# Patient Record
Sex: FEMALE | Race: BLACK OR AFRICAN AMERICAN | Employment: FULL TIME | URBAN - METROPOLITAN AREA
[De-identification: names, ages, dates, MRNs, and addresses within clinical notes are randomized per-mention and may not be internally consistent; named-entity substitution may affect disease eponyms.]

---

## 2017-09-08 ENCOUNTER — OFFICE VISIT (OUTPATIENT)
Dept: FAMILY MEDICINE CLINIC | Age: 27
End: 2017-09-08

## 2017-09-08 VITALS
BODY MASS INDEX: 29.49 KG/M2 | HEART RATE: 70 BPM | DIASTOLIC BLOOD PRESSURE: 104 MMHG | TEMPERATURE: 97.9 F | SYSTOLIC BLOOD PRESSURE: 142 MMHG | RESPIRATION RATE: 16 BRPM | OXYGEN SATURATION: 99 % | WEIGHT: 183.5 LBS | HEIGHT: 66 IN

## 2017-09-08 DIAGNOSIS — F31.9 BIPOLAR AFFECTIVE DISORDER, REMISSION STATUS UNSPECIFIED (HCC): Primary | ICD-10-CM

## 2017-09-08 RX ORDER — ACETAMINOPHEN AND CODEINE PHOSPHATE 120; 12 MG/5ML; MG/5ML
SOLUTION ORAL
Refills: 4 | COMMUNITY
Start: 2017-06-19 | End: 2017-10-03 | Stop reason: SDUPTHER

## 2017-09-08 RX ORDER — SERTRALINE HYDROCHLORIDE 50 MG/1
50 TABLET, FILM COATED ORAL DAILY
Qty: 30 TAB | Refills: 5 | Status: SHIPPED | OUTPATIENT
Start: 2017-09-08 | End: 2018-06-18 | Stop reason: SDUPTHER

## 2017-09-08 NOTE — MR AVS SNAPSHOT
Visit Information Date & Time Provider Department Dept. Phone Encounter #  
 9/8/2017  2:00 PM Meridith Soulier, NP [address] [facility] [phone] [id number] Follow-up Instructions Return in about 3 weeks (around 9/29/2017) for med check. Upcoming Health Maintenance Date Due DTaP/Tdap/Td series (1 - Tdap) 6/13/2011 PAP AKA CERVICAL CYTOLOGY 6/13/2011 INFLUENZA AGE 9 TO ADULT 8/1/2017 Allergies as of 9/8/2017  Review Complete On: 9/8/2017 By: Meridith Soulier, NP Severity Noted Reaction Type Reactions Imitrex [Sumatriptan] High 09/08/2017    Nausea Only, Other (comments) Blurred vision & hallucinations Current Immunizations  Never Reviewed No immunizations on file. Not reviewed this visit You Were Diagnosed With   
  
 Codes Comments Bipolar affective disorder, remission status unspecified ([facility] 75.)    -  Primary ICD-10-CM: F31.9 ICD-9-CM: 296.80 Vitals BP Pulse Temp Resp Height(growth percentile) Weight(growth percentile) (!) 142/104 70 97.9 °F (36.6 °C) (Oral) 16 5' 6\" (1.676 m) 183 lb 8 oz (83.2 kg) LMP SpO2 BMI OB Status Smoking Status 08/09/2017 (Approximate) 99% 29.62 kg/m2 Having regular periods Never Smoker Vitals History BMI and BSA Data Body Mass Index Body Surface Area  
 29.62 kg/m 2 1.97 m 2 Preferred Pharmacy Pharmacy Name Phone [facility]PHARMACY #[id number]- [address] AT [place] [address] [phone] Your Updated Medication List  
  
   
This list is accurate as of: 9/8/17  2:51 PM.  Always use your most recent med list.  
  
  
  
  
 norethindrone 0.35 mg Tab Commonly known as:  MICRONOR  
TAKE 1 TABLET BY MOUTH DAILY  
  
 sertraline 50 mg tablet Commonly known as:  ZOLOFT Take 1 Tab by mouth daily. Prescriptions Sent to Pharmacy  Refills  
 sertraline (ZOLOFT) 50 mg tablet 5  
 Sig: Take 1 Tab by mouth daily. Class: Normal  
 [id number] [place], [address] [facility] #: [phone] Route: Oral  
  
Follow-up Instructions Return in about 3 weeks (around 9/29/2017) for med check. Introducing [facility] & [facility] SERVICES! Kisha Johnson introduces [employer] patient portal. Now you can access parts of your medical record, email your doctor's office, and request medication refills online. 1. In your internet browser, go to [email or web address] 2. Click on the First Time User? Click Here link in the Sign In box. You will see the New Member Sign Up page. 3. Enter your [employer] Access Code exactly as it appears below. You will not need to use this code after youve completed the sign-up process. If you do not sign up before the expiration date, you must request a new code. · [employer] Access Code: [email or web address] Expires: 12/7/2017  2:11 PM 
 
4. Enter the last four digits of your Social Security Number (xxxx) and Date of Birth (mm/dd/yyyy) as indicated and click Submit. You will be taken to the next sign-up page. 5. Create a [employer]Perk ID. This will be your [employer] login ID and cannot be changed, so think of one that is secure and easy to remember. 6. Create a [employer] password. You can change your password at any time. 7. Enter your Password Reset Question and Answer. This can be used at a later time if you forget your password. 8. Enter your e-mail address. You will receive e-mail notification when new information is available in [address] 9. Click Sign Up. You can now view and download portions of your medical record. 10. Click the Download Summary menu link to download a portable copy of your medical information. If you have questions, please visit the Frequently Asked Questions section of the [employer]Perk website.  Remember, [employer]k is NOT to be used for urgent needs. For medical emergencies, dial 911. Now available from your iPhone and Android! Please provide this summary of care documentation to your next provider. If you have any questions after today's visit, please call [phone].

## 2017-09-08 NOTE — PROGRESS NOTES
1. Have you been to the ER, urgent care clinic since your last visit? Hospitalized since your last visit? No    2. Have you seen or consulted any other health care providers outside of the [address] [place] [address] since your last visit? Include any pap smears or colon screening. No    Chief Complaint   Patient presents with    [facility]    Bipolar     off of meds x 3 1/2 yrs, would like to discuss meds    Hypertension     postpartem BP issues x 9 mo    Migraine     x 3 yrs     Pt states she is currently nursing a 10 month old baby. She states she was referred to [facility] by Crow Nogueira. She was diagnosed as bipolar x 6 yrs but has not been on meds for 3 1/2 yrs. She would like to discuss meds. She also has had problems his hypertension for the last 9 months since childbirth. She also has an on going condition with migraines x 3 yrs.

## 2017-09-08 NOTE — PROGRESS NOTES
HISTORY OF PRESENT ILLNESS  Ankit Oswald is a 32 y.o. female. HPI  New patient to the practice. Pt states she is currently nursing a 10 month old baby. She states she was referred to [facility] by Miguel Sanchez. She was diagnosed as bipolar x 6 yrs but has not been on meds for 3 1/2 yrs. Prior to the birth of her 1year old. She would like to discuss meds. She also has had problems his hypertension for the last 9 months since childbirth. BP issues started only since child birth. She also has an on going condition with migraines x 3 yrs, these are also stress related. The FamHx, SocHx, MedHx, Medication, and Allergy lists have been reviewed and updated in the chart. ROS  A comprehensive review of system was obtained and negative except findings in the HPI    Visit Vitals    BP (!) 142/104    Pulse 70    Temp 97.9 °F (36.6 °C) (Oral)    Resp 16    Ht 5' 6\" (1.676 m)    Wt 183 lb 8 oz (83.2 kg)    LMP 08/09/2017 (Approximate)    SpO2 99%    BMI 29.62 kg/m2     Physical Exam   Constitutional: She is oriented to person, place, and time. She appears well-developed and well-nourished. Neck: No JVD present. Cardiovascular: Normal rate, regular rhythm and intact distal pulses. Exam reveals no gallop and no friction rub. No murmur heard. Pulmonary/Chest: Effort normal and breath sounds normal. No respiratory distress. She has no wheezes. Musculoskeletal: She exhibits no edema. Neurological: She is alert and oriented to person, place, and time. Skin: Skin is warm. Nursing note and vitals reviewed. ASSESSMENT and PLAN  Encounter Diagnoses   Name Primary?     Bipolar affective disorder, remission status unspecified ([facility] Yes     Orders Placed This Encounter    norethindrone (MICRONOR) 0.35 mg tab    sertraline (ZOLOFT) 50 mg tablet     Reviewed meds from before however since she is breast feeding  Sent in zoloft to help with mood for now but once done breast feeding we can add a mood stabilizer  Will recheck bp in 3 weeks, hopefully this will settle down with improved mood    I have discussed the diagnosis with the patient and the intended plan as seen in the above orders. The patient has received an after-visit summary and questions were answered concerning future plans. Patient conveyed understanding of the plan at the time of the visit.     Meridith Soulier, MSN, ANP  9/8/2017

## 2017-10-03 ENCOUNTER — OFFICE VISIT (OUTPATIENT)
Dept: FAMILY MEDICINE CLINIC | Age: 27
End: 2017-10-03

## 2017-10-03 VITALS
HEIGHT: 66 IN | TEMPERATURE: 98 F | HEART RATE: 71 BPM | BODY MASS INDEX: 28.69 KG/M2 | RESPIRATION RATE: 16 BRPM | WEIGHT: 178.5 LBS | OXYGEN SATURATION: 97 % | DIASTOLIC BLOOD PRESSURE: 94 MMHG | SYSTOLIC BLOOD PRESSURE: 132 MMHG

## 2017-10-03 DIAGNOSIS — I10 ESSENTIAL HYPERTENSION: Primary | ICD-10-CM

## 2017-10-03 DIAGNOSIS — F31.9 BIPOLAR AFFECTIVE DISORDER, REMISSION STATUS UNSPECIFIED (HCC): ICD-10-CM

## 2017-10-03 RX ORDER — METOPROLOL SUCCINATE 25 MG/1
12.5 TABLET, EXTENDED RELEASE ORAL DAILY
Qty: 30 TAB | Refills: 2 | Status: SHIPPED | OUTPATIENT
Start: 2017-10-03 | End: 2017-11-16 | Stop reason: SDUPTHER

## 2017-10-03 RX ORDER — ACETAMINOPHEN AND CODEINE PHOSPHATE 120; 12 MG/5ML; MG/5ML
1 SOLUTION ORAL DAILY
Qty: 1 PACKAGE | Refills: 5 | Status: SHIPPED | OUTPATIENT
Start: 2017-10-03 | End: 2018-09-04 | Stop reason: ALTCHOICE

## 2017-10-03 NOTE — PROGRESS NOTES
1. Have you been to the ER, urgent care clinic since your last visit? Hospitalized since your last visit? No    2. Have you seen or consulted any other health care providers outside of the [address] [place] [address] since your last visit? Include any pap smears or colon screening.  No    Chief Complaint   Patient presents with    Follow-up     3 wk f/u, med ck

## 2017-10-03 NOTE — MR AVS SNAPSHOT
Visit Information Date & Time Provider Department Dept. Phone Encounter #  
 10/3/2017  7:45 AM Avril Montoya NP [address] [facility] [phone] [id number] Follow-up Instructions Return in about 3 weeks (around 10/24/2017) for bp check. Upcoming Health Maintenance Date Due DTaP/Tdap/Td series (1 - Tdap) 6/13/2011 PAP AKA CERVICAL CYTOLOGY 6/13/2011 INFLUENZA AGE 9 TO ADULT 8/1/2017 Allergies as of 10/3/2017  Review Complete On: 10/3/2017 By: Avril Montoya NP Severity Noted Reaction Type Reactions Imitrex [Sumatriptan] High 09/08/2017    Nausea Only, Other (comments) Blurred vision & hallucinations Current Immunizations  Never Reviewed No immunizations on file. Not reviewed this visit You Were Diagnosed With   
  
 Codes Comments Essential hypertension    -  Primary ICD-10-CM: I10 
ICD-9-CM: 401.9 Bipolar affective disorder, remission status unspecified ([facility] 75.)     ICD-10-CM: F31.9 ICD-9-CM: 296.80 Vitals BP Pulse Temp Resp Height(growth percentile) Weight(growth percentile) (!) 132/94 71 98 °F (36.7 °C) (Oral) 16 5' 6\" (1.676 m) 178 lb 8 oz (81 kg) LMP SpO2 BMI OB Status Smoking Status 09/22/2017 (Approximate) 97% 28.81 kg/m2 Having regular periods Never Smoker Vitals History BMI and BSA Data Body Mass Index Body Surface Area  
 28.81 kg/m 2 1.94 m 2 [facility]erred [facility]acy [id number] [address] AT [place] [address] [phone] Your Updated Medication List  
  
   
This list is accurate as of: 10/3/17  8:09 AM.  Always use your most recent med list.  
  
  
  
  
 metoprolol succinate 25 mg XL tablet Commonly known as:  TOPROL-XL Take 0.5 Tabs by mouth daily. norethindrone 0.35 mg Tab Commonly known as:  Lion & Lion Take 1 Tab by mouth daily. sertraline 50 mg tablet Commonly known as:  ZOLOFT Take 1 Tab by mouth daily. Prescriptions Sent to Pharmacy Refills  
 metoprolol succinate (TOPROL-XL) 25 mg XL tablet 2 Sig: Take 0.5 Tabs by mouth daily. Class: Normal  
 Pharmacy: [facility]/pharmacy #[id number], [address] [facility] Ph #: [phone] Route: Oral  
 norethindrone (MICRONOR) 0.35 mg tab 5 Sig: Take 1 Tab by mouth daily. Class: Normal  
 Pharmacy: [facility]/pharmacy #[id number], [address] [facility] Ph #: [phone] Route: Oral  
  
Follow-up Instructions Return in about 3 weeks (around 10/24/2017) for bp check. Introducing [facility] & [facility]! Tye Rosa introduces [employer] patient portal. Now you can access parts of your medical record, email your doctor's office, and request medication refills online. 1. In your internet browser, go to [email or web address] 2. Click on the First Time User? Click Here link in the Sign In box. You will see the New Member Sign Up page. 3. Enter your [employer] Access Code exactly as it appears below. You will not need to use this code after youve completed the sign-up process. If you do not sign up before the expiration date, you must request a new code. · [employer] Access Code: [email or web address] Expires: 12/7/2017  2:11 PM 
 
4. Enter the last four digits of your Social Security Number (xxxx) and Date of Birth (mm/dd/yyyy) as indicated and click Submit. You will be taken to the next sign-up page. 5. Create a [employer] ID. This will be your [employer] login ID and cannot be changed, so think of one that is secure and easy to remember. 6. Create a [employer] password. You can change your password at any time. 7. Enter your Password Reset Question and Answer. This can be used at a later time if you forget your password. 8. Enter your e-mail address. You will receive e-mail notification when new information is available in [address] 9. Click Sign Up. You can now view and download portions of your medical record. 10. Click the Download Summary menu link to download a portable copy of your medical information. If you have questions, please visit the Frequently Asked Questions section of the [employer] website. Remember, [employer] is NOT to be used for urgent needs. For medical emergencies, dial 911. Now available from your iPhone and Android! Please provide this summary of care documentation to your next provider. If you have any questions after today's visit, please call [phone].

## 2017-10-03 NOTE — PROGRESS NOTES
HISTORY OF PRESENT ILLNESS  Claudette Bergeron is a 32 y.o. female. HPI  She is here for recheck bp and start of zoloft  Mood is improved, still not a lot of motivation but some happier, no adr/se of med  bp is still high, does have strong fhx of HTN/CAD, brought most recent labs from [facility] in May 2017, all in great range except cholesterol quite high  She is still breast feeding    ROS  A comprehensive review of system was obtained and negative except findings in the HPI    Visit Vitals    BP (!) 132/94    Pulse 71    Temp 98 °F (36.7 °C) (Oral)    Resp 16    Ht 5' 6\" (1.676 m)    Wt 178 lb 8 oz (81 kg)    LMP 09/22/2017 (Approximate)    SpO2 97%    BMI 28.81 kg/m2     Physical Exam   Constitutional: She is oriented to person, place, and time. She appears well-developed and well-nourished. Neck: No JVD present. Cardiovascular: Normal rate, regular rhythm and intact distal pulses. Exam reveals no gallop and no friction rub. No murmur heard. Pulmonary/Chest: Effort normal and breath sounds normal. No respiratory distress. She has no wheezes. Musculoskeletal: She exhibits no edema. Neurological: She is alert and oriented to person, place, and time. Skin: Skin is warm. Nursing note and vitals reviewed. ASSESSMENT and PLAN  Encounter Diagnoses   Name Primary?  Essential hypertension Yes    Bipolar affective disorder, remission status unspecified ([facility]      Orders Placed This Encounter    metoprolol succinate (TOPROL-XL) 25 mg XL tablet    norethindrone (MICRONOR) 0.35 mg tab     Start toprol xl 25mg 1/2 pill a day  Recheck bp with fasting chol in 3 weeks  No change in zoloft    I have discussed the diagnosis with the patient and the intended plan as seen in the above orders. The patient has received an after-visit summary and questions were answered concerning future plans. Patient conveyed understanding of the plan at the time of the visit.     Shena Foreman, MSN, ANP 10/3/2017

## 2017-11-16 RX ORDER — METOPROLOL SUCCINATE 25 MG/1
25 TABLET, EXTENDED RELEASE ORAL DAILY
Qty: 30 TAB | Refills: 0 | Status: SHIPPED | OUTPATIENT
Start: 2017-11-16 | End: 2017-12-16 | Stop reason: SDUPTHER

## 2017-12-18 RX ORDER — METOPROLOL SUCCINATE 25 MG/1
TABLET, EXTENDED RELEASE ORAL
Qty: 30 TAB | Refills: 0 | Status: SHIPPED | OUTPATIENT
Start: 2017-12-18 | End: 2018-04-08 | Stop reason: SDUPTHER

## 2018-01-26 ENCOUNTER — OFFICE VISIT (OUTPATIENT)
Dept: FAMILY MEDICINE CLINIC | Age: 28
End: 2018-01-26

## 2018-01-26 VITALS
OXYGEN SATURATION: 99 % | DIASTOLIC BLOOD PRESSURE: 82 MMHG | HEIGHT: 66 IN | BODY MASS INDEX: 29.93 KG/M2 | WEIGHT: 186.25 LBS | RESPIRATION RATE: 16 BRPM | TEMPERATURE: 98.4 F | HEART RATE: 66 BPM | SYSTOLIC BLOOD PRESSURE: 138 MMHG

## 2018-01-26 DIAGNOSIS — F31.9 BIPOLAR AFFECTIVE DISORDER, REMISSION STATUS UNSPECIFIED (HCC): Primary | ICD-10-CM

## 2018-01-26 RX ORDER — ARIPIPRAZOLE 10 MG/1
10 TABLET ORAL DAILY
Qty: 30 TAB | Refills: 5 | Status: SHIPPED | OUTPATIENT
Start: 2018-01-26 | End: 2018-09-04 | Stop reason: ALTCHOICE

## 2018-01-26 NOTE — MR AVS SNAPSHOT
[address] [place] [address] 
[phone] Patient: Melany Read MRN: [id number] MLI:4/20/1802 Visit Information Date & Time Provider Department Dept. Phone Encounter #  
 1/26/2018  1:15 PM Jus Kunz, [address] [phone] [id number] Follow-up Instructions Return in about 3 weeks (around 2/16/2018) for med check. Upcoming Health Maintenance Date Due DTaP/Tdap/Td series (1 - Tdap) 6/13/2011 PAP AKA CERVICAL CYTOLOGY 6/13/2011 Influenza Age 5 to Adult 8/1/2017 Allergies as of 1/26/2018  Review Complete On: 1/26/2018 By: Jus Kunz NP Severity Noted Reaction Type Reactions Imitrex [Sumatriptan] High 09/08/2017    Nausea Only, Other (comments) Blurred vision & hallucinations Current Immunizations  Never Reviewed No immunizations on file. Not reviewed this visit You Were Diagnosed With   
  
 Codes Comments Bipolar affective disorder, remission status unspecified ([facility] 75.)    -  Primary ICD-10-CM: F31.9 ICD-9-CM: 296.80 Vitals BP Pulse Temp Resp Height(growth percentile) Weight(growth percentile) 138/82 66 98.4 °F (36.9 °C) (Oral) 16 5' 6\" (1.676 m) 186 lb 4 oz (84.5 kg) LMP SpO2 BMI OB Status Smoking Status 01/24/2018 (Exact Date) 99% 30.06 kg/m2 Having regular periods Never Smoker Vitals History BMI and BSA Data Body Mass Index Body Surface Area 30.06 kg/m 2 1.98 m 2 [facility]erred [facility]acy [id number] [address] [facility] AT [place] [address] [phone] Your Updated Medication List  
  
   
This list is accurate as of: 1/26/18  1:43 PM.  Always use your most recent med list.  
  
  
  
  
 ARIPiprazole 10 mg tablet Commonly known as:  ABILIFY Take 1 Tab by mouth daily. metoprolol succinate 25 mg XL tablet Commonly known as:  TOPROL-XL  
TAKE 1 TABLET BY MOUTH EVERY DAY  
  
 norethindrone 0.35 mg Tab Commonly known as:  Lion & Lion Take 1 Tab by mouth daily. sertraline 50 mg tablet Commonly known as:  ZOLOFT Take 1 Tab by mouth daily. Prescriptions Sent to Pharmacy Refills ARIPiprazole (ABILIFY) 10 mg tablet 5 Sig: Take 1 Tab by mouth daily. Class: Normal  
 Pharmacy: [facility]/pharmacy #[id number], [address] [facility] #: [phone] Route: Oral  
  
Follow-up Instructions Return in about 3 weeks (around 2/16/2018) for med check. Introducing [facility] & [facility] SERVICES! Dear Ander Hatchet: Thank you for requesting a [employer] account. Our records indicate that you already have an active [employer] account. You can access your account anytime at [email or web address] Ginger [email or web address] Gates Did you know that you can access your hospital and ER discharge instructions at any time in [employer]? You can also review all of your test results from your hospital stay or ER visit. Additional Information If you have questions, please visit the Frequently Asked Questions section of the [employer] website at [email or web address] Ginger [email or web address] Gates/. Remember, [employer] is NOT to be used for urgent needs. For medical emergencies, dial 911. Now available from your iPhone and Android! Please provide this summary of care documentation to your next provider. If you have any questions after today's visit, please call [phone].

## 2018-01-26 NOTE — PROGRESS NOTES
1. Have you been to the ER, urgent care clinic since your last visit? Hospitalized since your last visit? No    2. Have you seen or consulted any other health care providers outside of the [address] [place] [address] since your last visit? Include any pap smears or colon screening.  No    Chief Complaint   Patient presents with    Follow-up     3 mo f/u

## 2018-01-26 NOTE — PROGRESS NOTES
HISTORY OF PRESENT ILLNESS  Ronald Calix is a 32 y.o. female. HPI  She is here for follow up bp which is improved on the metoprolol  No adr/se of med  Weaned son down from breast feeding  Needs to restart her bipolar med  zoloft is not enough  Having manic phases with spending, does not remember the med she took several years ago    ROS  A comprehensive review of system was obtained and negative except findings in the HPI    Visit Vitals    /82    Pulse 66    Temp 98.4 °F (36.9 °C) (Oral)    Resp 16    Ht 5' 6\" (1.676 m)    Wt 186 lb 4 oz (84.5 kg)    LMP 01/24/2018 (Exact Date)    SpO2 99%    BMI 30.06 kg/m2     Physical Exam   Constitutional: She is oriented to person, place, and time. She appears well-developed and well-nourished. Neck: No JVD present. Cardiovascular: Normal rate, regular rhythm and intact distal pulses. Exam reveals no gallop and no friction rub. No murmur heard. Pulmonary/Chest: Effort normal and breath sounds normal. No respiratory distress. She has no wheezes. Musculoskeletal: She exhibits no edema. Neurological: She is alert and oriented to person, place, and time. Skin: Skin is warm. Nursing note and vitals reviewed. ASSESSMENT and PLAN  Encounter Diagnoses   Name Primary?  Bipolar affective disorder, remission status unspecified ([facility] Yes     Orders Placed This Encounter    ARIPiprazole (ABILIFY) 10 mg tablet     Start Abilify 10mg in the evening  Keep the zoloft as well  No change to bp med  Recheck 3 weeks    I have discussed the diagnosis with the patient and the intended plan as seen in the above orders. The patient has received an after-visit summary and questions were answered concerning future plans. Patient conveyed understanding of the plan at the time of the visit.     Santhosh Rich, MSN, ANP  1/26/2018

## 2018-03-14 ENCOUNTER — TELEPHONE (OUTPATIENT)
Dept: FAMILY MEDICINE CLINIC | Age: 28
End: 2018-03-14

## 2018-03-14 RX ORDER — AZITHROMYCIN 250 MG/1
TABLET, FILM COATED ORAL
Qty: 6 TAB | Refills: 0 | Status: SHIPPED | OUTPATIENT
Start: 2018-03-14 | End: 2018-05-30 | Stop reason: ALTCHOICE

## 2018-03-14 NOTE — TELEPHONE ENCOUNTER
Pt called stating that she thinks she may have a sinus infection, she has had yellowish mucus with a tinge of blood when she blows her nose, severe headache for about 4 days, she has only taken Excedrin for the headaches nothing otc for the sinus drainage. She would like to know what she can take or if something can be called into pharmacy for her, she states that she is in between insurance, therefore she declined a appt. # [phone].

## 2018-05-30 ENCOUNTER — OFFICE VISIT (OUTPATIENT)
Dept: FAMILY MEDICINE CLINIC | Age: 28
End: 2018-05-30

## 2018-05-30 VITALS
HEIGHT: 66 IN | WEIGHT: 200 LBS | OXYGEN SATURATION: 98 % | HEART RATE: 106 BPM | SYSTOLIC BLOOD PRESSURE: 153 MMHG | RESPIRATION RATE: 16 BRPM | BODY MASS INDEX: 32.14 KG/M2 | TEMPERATURE: 99.5 F | DIASTOLIC BLOOD PRESSURE: 97 MMHG

## 2018-05-30 DIAGNOSIS — I10 ESSENTIAL HYPERTENSION: Primary | ICD-10-CM

## 2018-05-30 DIAGNOSIS — J01.10 ACUTE NON-RECURRENT FRONTAL SINUSITIS: ICD-10-CM

## 2018-05-30 RX ORDER — CEFDINIR 300 MG/1
300 CAPSULE ORAL 2 TIMES DAILY
Qty: 20 CAP | Refills: 0 | Status: SHIPPED | OUTPATIENT
Start: 2018-05-30 | End: 2018-06-07 | Stop reason: ALTCHOICE

## 2018-05-30 RX ORDER — MOMETASONE FUROATE 50 UG/1
2 SPRAY, METERED NASAL DAILY
Qty: 1 CONTAINER | Refills: 2 | Status: SHIPPED | OUTPATIENT
Start: 2018-05-30 | End: 2018-09-18 | Stop reason: CLARIF

## 2018-05-30 RX ORDER — METOPROLOL SUCCINATE 50 MG/1
50 TABLET, EXTENDED RELEASE ORAL DAILY
Qty: 30 TAB | Refills: 2 | Status: SHIPPED | OUTPATIENT
Start: 2018-05-30 | End: 2018-09-04 | Stop reason: SDUPTHER

## 2018-05-30 NOTE — MR AVS SNAPSHOT
[address] [place] [address] 
[phone] Patient: Bassem Putnam MRN: [id number] BJN:0/66/0263 Visit Information Date & Time Provider Department Dept. Phone Encounter #  
 5/30/2018  9:15 AM Lissy Yu NP [address] [facility] [phone] [id number] Upcoming Health Maintenance Date Due DTaP/Tdap/Td series (1 - Tdap) 6/13/2011 PAP AKA CERVICAL CYTOLOGY 6/13/2011 Influenza Age 5 to Adult 8/1/2018 Allergies as of 5/30/2018  Review Complete On: 5/30/2018 By: Lissy Yu NP Severity Noted Reaction Type Reactions Imitrex [Sumatriptan] High 09/08/2017    Nausea Only, Other (comments) Blurred vision & hallucinations Current Immunizations  Never Reviewed No immunizations on file. Not reviewed this visit You Were Diagnosed With   
  
 Codes Comments Essential hypertension    -  Primary ICD-10-CM: I10 
ICD-9-CM: 401.9 Acute non-recurrent frontal sinusitis     ICD-10-CM: J01.10 ICD-9-CM: 703.1 Vitals BP Pulse Temp Resp Height(growth percentile) Weight(growth percentile) (!) 153/97 (!) 106 99.5 °F (37.5 °C) (Oral) 16 5' 6\" (1.676 m) 200 lb (90.7 kg) LMP SpO2 BMI OB Status Smoking Status 04/30/2018 (Exact Date) 98% 32.28 kg/m2 Having regular periods Never Smoker BMI and BSA Data Body Mass Index Body Surface Area  
 32.28 kg/m 2 2.06 m 2 Preferred Pharmacy Pharmacy Name Phone [address] [phone] Your Updated Medication List  
  
   
This list is accurate as of 5/30/18  9:50 AM.  Always use your most recent med list.  
  
  
  
  
 ARIPiprazole 10 mg tablet Commonly known as:  ABILIFY Take 1 Tab by mouth daily. cefdinir 300 mg capsule Commonly known as:  OMNICEF Take 1 Cap by mouth two (2) times a day for 10 days. metoprolol succinate 50 mg XL tablet Commonly known as:  TOPROL-XL Take 1 Tab by mouth daily. mometasone 50 mcg/actuation nasal spray Commonly known as:  NASONEX  
2 Sprays by Both Nostrils route daily. norethindrone 0.35 mg Tab Commonly known as:  Lion & Lion Take 1 Tab by mouth daily. sertraline 50 mg tablet Commonly known as:  ZOLOFT Take 1 Tab by mouth daily. Prescriptions Sent to Pharmacy Refills  
 cefdinir (OMNICEF) 300 mg capsule 0 Sig: Take 1 Cap by mouth two (2) times a day for 10 days. Class: Normal  
 Pharmacy: [address] [facility], [address] Ph #: [phone] Route: Oral  
 mometasone (NASONEX) 50 mcg/actuation nasal spray 2 Si Sprays by Both Nostrils route daily. Class: Normal  
 Pharmacy: [address] [facility] [address], [address] [place] Ph #: [phone] Route: Both Nostrils  
 metoprolol succinate (TOPROL-XL) 50 mg XL tablet 2 Sig: Take 1 Tab by mouth daily. Class: Normal  
 Pharmacy: [address] [facility], [address] Ph #: [phone] Route: Oral  
  
Introducing [facility] & [facility] SERVICES! Dear [facility]: Thank you for requesting a [employer] account. Our records indicate that you already have an active [employer] account. You can access your account anytime at [email or web address] [facility] Did you know that you can access your hospital and ER discharge instructions at any time in [employer]? You can also review all of your test results from your hospital stay or ER visit. Additional Information If you have questions, please visit the Frequently Asked Questions section of the [employer] website at [email or web address] Remember, [employer] is NOT to be used for urgent needs. For medical emergencies, dial 911. Now available from your iPhone and Android! Please provide this summary of care documentation to your next provider. If you have any questions after today's visit, please call [phone].

## 2018-05-30 NOTE — PROGRESS NOTES
1. Have you been to the ER, urgent care clinic since your last visit? Hospitalized since your last visit? Ana Silva, 5/2018  2. Have you seen or consulted any other health care providers outside of the [address] [place] [address] since your last visit? Include any pap smears or colon screening.  No     Chief Complaint   Patient presents with    Sinus Infection     Head pressure, nasal pressure, x2 weeks

## 2018-05-30 NOTE — PROGRESS NOTES
Chief Complaint   Patient presents with    Sinus Infection     Head pressure, nasal pressure, x2 weeks     she is a 32y.o. year old female who presents for evalution. Pt states has been having sinus pressure and pain for past 2 weeks. Last night started having fever and body aches as well. Has been taking Tylenol and Tylenol sinus but not really helping. Pt currently breast feeding. Pt states blood pressure too high, even while checking at home. Typically 150/90s even if taking medication -forgot to take this morning. No chest pain, SOB, vision changes. Reviewed PmHx, RxHx, FmHx, SocHx, AllgHx and updated and dated in the chart. Review of Systems - negative except as listed above in the HPI    Objective:     Vitals:    05/30/18 0934   BP: (!) 153/97   Pulse: (!) 106   Resp: 16   Temp: 99.5 °F (37.5 °C)   TempSrc: Oral   SpO2: 98%   Weight: 200 lb (90.7 kg)   Height: 5' 6\" (1.676 m)     Physical Examination: General appearance - alert, well appearing, and in no distress  Ears - bilateral TM's and external ear canals normal  Nose - mucosal congestion, mucosal erythema and sinus tenderness noted frontal  Mouth - mucous membranes moist, pharynx normal without lesions and erythematous  Neck - bilateral symmetric anterior adenopathy  Chest - clear to auscultation, no wheezes, rales or rhonchi, symmetric air entry  Heart - normal rate, regular rhythm, normal S1, S2, no murmurs, rubs, clicks or gallops  Extremities - peripheral pulses normal, no pedal edema, no clubbing or cyanosis    Assessment/ Plan:   Diagnoses and all orders for this visit:    1. Essential hypertension  -     metoprolol succinate (TOPROL-XL) 50 mg XL tablet; Take 1 Tab by mouth daily. Increase rx. Encouraged pt to monitor BP at home, preferably in AM when first wakes up. Goal BP is < 130/90 if on meds.   Discussed consequences of uncontrolled HTN and need for yearly eye exam. Recommended pt limit salt intake and engage in regular exercise. Continue current medications. F/U 1-2 mo or sooner if needed    2. Acute non-recurrent frontal sinusitis  -     cefdinir (OMNICEF) 300 mg capsule; Take 1 Cap by mouth two (2) times a day for 10 days. -     mometasone (NASONEX) 50 mcg/actuation nasal spray; 2 Sprays by Both Nostrils route daily. New rx. Discussed and encouraged rest and clear fluids, if congestion is thick should avoid dairy. Recommended hot showers with steam and elevating head of bed. May use Vitamin C or Echinacea in addition to current therapy. Pt voiced understanding regarding plan of care. Follow-up Disposition:  Return if symptoms worsen or fail to improve. I have discussed the diagnosis with the patient and the intended plan as seen in the above orders. The patient has received an after-visit summary and questions were answered concerning future plans.      Medication Side Effects and Warnings were discussed with patient    Betty Mota NP

## 2018-06-07 ENCOUNTER — OFFICE VISIT (OUTPATIENT)
Dept: FAMILY MEDICINE CLINIC | Age: 28
End: 2018-06-07

## 2018-06-07 VITALS
HEIGHT: 66 IN | WEIGHT: 203 LBS | OXYGEN SATURATION: 97 % | BODY MASS INDEX: 32.62 KG/M2 | RESPIRATION RATE: 20 BRPM | TEMPERATURE: 98.3 F

## 2018-06-07 DIAGNOSIS — I10 ESSENTIAL HYPERTENSION: ICD-10-CM

## 2018-06-07 DIAGNOSIS — J02.9 SORE THROAT: Primary | ICD-10-CM

## 2018-06-07 RX ORDER — LEVOCETIRIZINE DIHYDROCHLORIDE 5 MG/1
5 TABLET, FILM COATED ORAL DAILY
Qty: 30 TAB | Refills: 5 | Status: SHIPPED | OUTPATIENT
Start: 2018-06-07 | End: 2018-09-18 | Stop reason: CLARIF

## 2018-06-07 NOTE — MR AVS SNAPSHOT
[address] [address] [phone] Patient: Lon Porter MRN: [id number] KTU:9/93/1590 Visit Information Date & Time Provider Department Dept. Phone Encounter #  
 6/7/2018  3:45 PM Fela Barron MD [facility] [phone] [id number] Follow-up Instructions Return if symptoms worsen or fail to improve. Your Appointments 6/29/2018  8:15 AM  
ESTABLISHED PATIENT with Irina Boo NP [facility] ([facility]) Appt Note: f/u with blood pressure N [address] [place] [address] Road [address] [phone]  
  
   
 N [address] [place] [id number] Upcoming Health Maintenance Date Due DTaP/Tdap/Td series (1 - Tdap) 6/13/2011 PAP AKA CERVICAL CYTOLOGY 6/13/2011 Influenza Age 5 to Adult 8/1/2018 Allergies as of 6/7/2018  Review Complete On: 6/7/2018 By: Fela Barron MD  
  
 Severity Noted Reaction Type Reactions Imitrex [Sumatriptan] High 09/08/2017    Nausea Only, Other (comments) Blurred vision & hallucinations Current Immunizations  Never Reviewed No immunizations on file. Not reviewed this visit You Were Diagnosed With   
  
 Codes Comments Sore throat    -  Primary ICD-10-CM: J02.9 ICD-9-CM: 613 Essential hypertension     ICD-10-CM: I10 
ICD-9-CM: 401.9 Vitals BP Temp Resp Height(growth percentile) Weight(growth percentile) SpO2  
 (!) (P) 171/116 98.3 °F (36.8 °C) 20 5' 6\" (1.676 m) 203 lb (92.1 kg) 97% BMI OB Status Smoking Status 32.77 kg/m2 Having regular periods Never Smoker Vitals History BMI and BSA Data Body Mass Index Body Surface Area 32.77 kg/m 2 2.07 m 2 Preferred Pharmacy Pharmacy Name Phone [id number] [address] [facility] AT [place] [address] [phone] Your Updated Medication List  
 This list is accurate as of 6/7/18  4:21 PM.  Always use your most recent med list.  
  
  
  
  
 ARIPiprazole 10 mg tablet Commonly known as:  ABILIFY Take 1 Tab by mouth daily. levocetirizine 5 mg tablet Commonly known as:  Charol High Take 1 Tab by mouth daily. metoprolol succinate 50 mg XL tablet Commonly known as:  TOPROL-XL Take 1 Tab by mouth daily. mometasone 50 mcg/actuation nasal spray Commonly known as:  NASONEX  
2 Sprays by Both Nostrils route daily. norethindrone 0.35 mg Tab Commonly known as:  Lion & Lion Take 1 Tab by mouth daily. sertraline 50 mg tablet Commonly known as:  ZOLOFT Take 1 Tab by mouth daily. Prescriptions Sent to Pharmacy Refills  
 levocetirizine (XYZAL) 5 mg tablet 5 Sig: Take 1 Tab by mouth daily. Class: Normal  
 Pharmacy: [facility]/pharmacy #[id number] Ph #: [phone] Route: Oral  
  
We Performed the Following AMB POC RAPID STREP A [55873 CPT(R)] Follow-up Instructions Return if symptoms worsen or fail to improve. Introducing [facility]! Dear Shawn Hester: Thank you for requesting a [employer] account. Our records indicate that you already have an active [employer] account. You can access your account anytime at [email or web address] Did you know that you can access your hospital and ER discharge instructions at any time in [employer]? You can also review all of your test results from your hospital stay or ER visit. Additional Information If you have questions, please visit the Frequently Asked Questions section of the [employer] website at [email or web address] Remember, [employer] is NOT to be used for urgent needs. For medical emergencies, dial 911. Now available from your iPhone and Android! Please provide this summary of care documentation to your next provider. If you have any questions after today's visit, please call [phone].

## 2018-06-07 NOTE — PROGRESS NOTES
Patient here for sore throat and cough, mostly at night, bloody mucous since Friday. Patient states she is nursing at this time. Baby is 21 months old. 1. Have you been to the ER, urgent care clinic since your last visit? Hospitalized since your last visit? No    2. Have you seen or consulted any other health care providers outside of the [address] [place] [address] since your last visit? Include any pap smears or colon screening. No         Chief Complaint   Patient presents with    Cough     cough , sore throat since Friday     She is a 32 y.o. female who presents for evalution. Reviewed PmHx, RxHx, FmHx, SocHx, AllgHx and updated and dated in the chart. Patient Active Problem List    Diagnosis    Essential hypertension    Bipolar affective disorder ([facility] [address]       Review of Systems - negative except as listed above in the HPI    Objective:     Vitals:    06/07/18 1601   BP: (!) (P) 171/116   Resp: 20   Temp: 98.3 °F (36.8 °C)   SpO2: 97%   Weight: 203 lb (92.1 kg)   Height: 5' 6\" (1.676 m)     Physical Examination: General appearance - alert, well appearing, and in no distress  Nose - clear rhinorrhea  Neck - supple, no significant adenopathy  Chest - clear to auscultation, no wheezes, rales or rhonchi, symmetric air entry  Heart - normal rate, regular rhythm, normal S1, S2, no murmurs, rubs, clicks or gallops    Assessment/ Plan:   Diagnoses and all orders for this visit:    1. Sore throat  -     AMB POC RAPID STREP A-neg  -     levocetirizine (XYZAL) 5 mg tablet; Take 1 Tab by mouth daily. 2. Essential hypertension  -better       Follow-up Disposition:  Return if symptoms worsen or fail to improve. I have discussed the diagnosis with the patient and the intended plan as seen in the above orders. The patient understands and agrees with the plan. The patient has received an after-visit summary and questions were answered concerning future plans.      Medication Side Effects and Warnings were discussed with patient  Patient Labs were reviewed and or requested:  Patient Past Records were reviewed and or requested    Linnette Coronel M.D. There are no Patient Instructions on file for this visit.

## 2018-06-18 RX ORDER — SERTRALINE HYDROCHLORIDE 50 MG/1
50 TABLET, FILM COATED ORAL DAILY
Qty: 30 TAB | Refills: 5 | Status: SHIPPED | OUTPATIENT
Start: 2018-06-18 | End: 2018-09-11 | Stop reason: SDUPTHER

## 2018-09-04 DIAGNOSIS — I10 ESSENTIAL HYPERTENSION: ICD-10-CM

## 2018-09-04 RX ORDER — ETONOGESTREL AND ETHINYL ESTRADIOL 11.7; 2.7 MG/1; MG/1
INSERT, EXTENDED RELEASE VAGINAL
Qty: 3 DEVICE | Refills: 3 | Status: ON HOLD | OUTPATIENT
Start: 2018-09-04 | End: 2019-10-15

## 2018-09-04 RX ORDER — METOPROLOL SUCCINATE 50 MG/1
50 TABLET, EXTENDED RELEASE ORAL DAILY
Qty: 30 TAB | Refills: 2 | Status: SHIPPED | OUTPATIENT
Start: 2018-09-04 | End: 2019-01-11 | Stop reason: SDUPTHER

## 2018-09-04 RX ORDER — LAMOTRIGINE 25 MG/1
25 TABLET ORAL DAILY
Qty: 30 TAB | Refills: 5 | Status: SHIPPED | OUTPATIENT
Start: 2018-09-04 | End: 2018-11-28 | Stop reason: SDUPTHER

## 2018-09-11 RX ORDER — SERTRALINE HYDROCHLORIDE 50 MG/1
TABLET, FILM COATED ORAL
Qty: 30 TAB | Refills: 1 | Status: SHIPPED | OUTPATIENT
Start: 2018-09-11 | End: 2018-11-28 | Stop reason: SDUPTHER

## 2018-09-13 ENCOUNTER — TELEPHONE (OUTPATIENT)
Dept: FAMILY MEDICINE CLINIC | Age: 28
End: 2018-09-13

## 2018-09-13 RX ORDER — AZITHROMYCIN 250 MG/1
TABLET, FILM COATED ORAL
Qty: 6 TAB | Refills: 0 | Status: SHIPPED | OUTPATIENT
Start: 2018-09-13 | End: 2018-09-18 | Stop reason: ALTCHOICE

## 2018-09-13 NOTE — TELEPHONE ENCOUNTER
----- Message from 56Ritchie Michael Cornell sent at 9/12/2018  5:09 PM EDT -----  Regarding: GINGER Honeycutt/Skye Fuchs  is requesting a Trinh Ira' states that she has a sinus infection. [facility]. Best contact [phone].

## 2018-09-18 ENCOUNTER — OFFICE VISIT (OUTPATIENT)
Dept: FAMILY MEDICINE CLINIC | Age: 28
End: 2018-09-18

## 2018-09-18 VITALS
HEIGHT: 66 IN | WEIGHT: 203.2 LBS | BODY MASS INDEX: 32.66 KG/M2 | SYSTOLIC BLOOD PRESSURE: 132 MMHG | RESPIRATION RATE: 18 BRPM | DIASTOLIC BLOOD PRESSURE: 98 MMHG | OXYGEN SATURATION: 96 % | HEART RATE: 62 BPM | TEMPERATURE: 98.6 F

## 2018-09-18 DIAGNOSIS — I10 ESSENTIAL HYPERTENSION: ICD-10-CM

## 2018-09-18 DIAGNOSIS — J01.00 ACUTE MAXILLARY SINUSITIS, RECURRENCE NOT SPECIFIED: ICD-10-CM

## 2018-09-18 DIAGNOSIS — N91.2 AMENORRHEA: ICD-10-CM

## 2018-09-18 DIAGNOSIS — Z00.00 WELL ADULT EXAM: Primary | ICD-10-CM

## 2018-09-18 DIAGNOSIS — J30.9 ALLERGIC RHINITIS, UNSPECIFIED SEASONALITY, UNSPECIFIED TRIGGER: ICD-10-CM

## 2018-09-18 LAB
HCG URINE, QL. (POC): NEGATIVE
VALID INTERNAL CONTROL?: YES

## 2018-09-18 RX ORDER — FLUTICASONE PROPIONATE 50 MCG
2 SPRAY, SUSPENSION (ML) NASAL DAILY
Qty: 1 BOTTLE | Refills: 5 | Status: ON HOLD | OUTPATIENT
Start: 2018-09-18 | End: 2019-10-15

## 2018-09-18 RX ORDER — HYDROCHLOROTHIAZIDE 12.5 MG/1
12.5 TABLET ORAL DAILY
Qty: 30 TAB | Refills: 5 | Status: SHIPPED | OUTPATIENT
Start: 2018-09-18 | End: 2018-12-06 | Stop reason: SDUPTHER

## 2018-09-18 RX ORDER — AZITHROMYCIN 250 MG/1
TABLET, FILM COATED ORAL
Qty: 6 TAB | Refills: 0 | Status: ON HOLD | OUTPATIENT
Start: 2018-09-18 | End: 2019-10-15

## 2018-09-18 RX ORDER — CETIRIZINE HCL 10 MG
10 TABLET ORAL DAILY
Qty: 30 TAB | Refills: 5 | Status: ON HOLD | OUTPATIENT
Start: 2018-09-18 | End: 2019-10-15

## 2018-09-18 NOTE — MR AVS SNAPSHOT
[address] [place] [address] 
[phone] Patient: Tricia Ventura MRN: [id number] OPE:[birth year] Visit Information Date & Time Provider Department Dept. Phone Encounter #  
 9/18/2018  7:45 AM Hector Mackay NP [facility] [phone] [id number] Upcoming Health Maintenance Date Due DTaP/Tdap/Td series (1 - Tdap) 6/13/2011 PAP AKA CERVICAL CYTOLOGY 6/13/2011 Influenza Age 5 to Adult 8/1/2018 Allergies as of 9/18/2018  Review Complete On: 9/18/2018 By: Camila Mccoy Severity Noted Reaction Type Reactions Imitrex [Sumatriptan] High 09/08/2017    Nausea Only, Other (comments) Blurred vision & hallucinations Current Immunizations  Never Reviewed No immunizations on file. Not reviewed this visit You Were Diagnosed With   
  
 Codes Comments Well adult exam    -  Primary ICD-10-CM: Z00.00 ICD-9-CM: V70.0 Amenorrhea     ICD-10-CM: N91.2 ICD-9-CM: 626.0 Allergic rhinitis, unspecified seasonality, unspecified trigger     ICD-10-CM: J30.9 ICD-9-CM: 477.9 Essential hypertension     ICD-10-CM: I10 
ICD-9-CM: 401.9 Acute maxillary sinusitis, recurrence not specified     ICD-10-CM: J01.00 ICD-9-CM: 461.0 Vitals BP Pulse Temp Resp Height(growth percentile) Weight(growth percentile) (!) 132/98 (BP 1 Location: Left arm, BP Patient Position: Sitting) 62 98.6 °F (37 °C) (Oral) 18 5' 6\" (1.676 m) 203 lb 3.2 oz (92.2 kg) LMP SpO2 BMI OB Status Smoking Status 08/15/2018 96% 32.8 kg/m2 Having regular periods Never Smoker Vitals History BMI and BSA Data Body Mass Index Body Surface Area  
 32.8 kg/m 2 2.07 m 2 Preferred Pharmacy Pharm[facility] Name Phone [id number] [address] AT [place] [address] [phone] Your Updated Medication List  
  
   
 This list is accurate as of 18  8:49 AM.  Always use your most recent med list.  
  
  
  
  
 azithromycin 250 mg tablet Commonly known as:  Zionkaylie Angel Take two tablets today then one tablet daily  
  
 cetirizine 10 mg tablet Commonly known as:  ZYRTEC Take 1 Tab by mouth daily. ethinyl estradiol-etonogestrel 0.12-0.015 mg/24 hr vaginal ring Commonly known as:  Tennille Benes Insert vaginally for 21 days remove for 7 days  
  
 fluticasone 50 mcg/actuation nasal spray Commonly known as:  Candelario Peel 2 Sprays by Both Nostrils route daily. hydroCHLOROthiazide 12.5 mg tablet Commonly known as:  HYDRODIURIL Take 1 Tab by mouth daily. lamoTRIgine 25 mg tablet Commonly known as: LaMICtal  
Take 1 Tab by mouth daily. - 7 pm  
  
 metoprolol succinate 50 mg XL tablet Commonly known as:  TOPROL-XL Take 1 Tab by mouth daily. sertraline 50 mg tablet Commonly known as:  ZOLOFT  
TAKE 1 TABLET BY MOUTH DAILY. Prescriptions Sent to Pharmacy Refills  
 cetirizine (ZYRTEC) 10 mg tablet 5 Sig: Take 1 Tab by mouth daily. Class: Normal  
 Pharmacy: [facility]/pharmacy #[id number] Ph #: [phone] Route: Oral  
 fluticasone (FLONASE) 50 mcg/actuation nasal spray 5 Si Sprays by Both Nostrils route daily. Class: Normal  
 Pharmacy: [facility]/pharmacy #[id number] Ph #: [phone] Route: Both Nostrils  
 azithromycin (ZITHROMAX Z-BUD) 250 mg tablet 0 Sig: Take two tablets today then one tablet daily Class: Normal  
 Pharmacy: [facility]/pharmacy #[id number] [address] [facility] Ph #: [phone]  
 hydroCHLOROthiazide (HYDRODIURIL) 12.5 mg tablet 5 Sig: Take 1 Tab by mouth daily.   
 Class: Normal  
 Pharmacy: [facility]/pharmacy #[id number] - [place], [address] [facility] #: [phone] Route: Oral  
  
We Performed the Following AMB POC URINE PREGNANCY TEST, VISUAL COLOR COMPARISON [38693 CPT(R)] CBC WITH AUTOMATED DIFF [49773 CPT(R)] LIPID PANEL [36106 CPT(R)] METABOLIC PANEL, COMPREHENSIVE [53034 CPT(R)] TSH 3RD GENERATION [63770 CPT(R)] Introducing [facility]! Dear Miguelangel Ortiz: Thank you for requesting a [employer] account. Our records indicate that you already have an active [employer] account. You can access your account anytime at [email or web address]. [employer] Did you know that you can access your hospital and ER discharge instructions at any time in [employer]? You can also review all of your test results from your hospital stay or ER visit. Additional Information If you have questions, please visit the Frequently Asked Questions section of the [employer] website at [email or web address]. [email or web address]/. Remember, [employer] is NOT to be used for urgent needs. For medical emergencies, dial 911. Now available from your iPhone and Android! Please provide this summary of care documentation to your next provider. If you have any questions after today's visit, please call [phone].

## 2018-09-18 NOTE — PROGRESS NOTES
Subjective:   1 [address] y.o. female for Well Woman Check. Her gyne and breast care is done elsewhere by her Ob-Gyne physician. Patient Active Problem List    Diagnosis Date Noted    Allergic rhinitis 09/18/2018    Essential hypertension 10/03/2017    Bipolar affective disorder ([facility] [address] 09/08/2017     Current Outpatient Prescriptions   Medication Sig Dispense Refill    cetirizine (ZYRTEC) 10 mg tablet Take 1 Tab by mouth daily. 30 Tab 5    fluticasone (FLONASE) 50 mcg/actuation nasal spray 2 Sprays by Both Nostrils route daily. 1 Bottle 5    azithromycin (ZITHROMAX Z-BUD) 250 mg tablet Take two tablets today then one tablet daily 6 Tab 0    hydroCHLOROthiazide (HYDRODIURIL) 12.5 mg tablet Take 1 Tab by mouth daily. 30 Tab 5    sertraline (ZOLOFT) 50 mg tablet TAKE 1 TABLET BY MOUTH DAILY. 30 Tab 1    metoprolol succinate (TOPROL-XL) 50 mg XL tablet Take 1 Tab by mouth daily. 30 Tab 2    ethinyl estradiol-etonogestrel (NUVARING) 0.12-0.015 mg/24 hr vaginal ring Insert vaginally for 21 days remove for 7 days 3 Device 3    lamoTRIgine (LAMICTAL) 25 mg tablet Take 1 Tab by mouth daily. - 7 pm 27 Tab 5     Family History   Problem Relation Age of Onset    Hypertension Mother     Other Mother      fibromyalgia    Diabetes Mother     Sleep Apnea Mother     Hypertension Father      Social History   Substance Use Topics    Smoking status: Never Smoker    Smokeless tobacco: Never Used    Alcohol use 3.0 oz/week     1 Glasses of wine, 4 Shots of liquor per week      Comment:  wine daily, liquor on the weekends             ROS: Feeling generally well. No TIA's or unusual headaches, no dysphagia. No prolonged cough. No dyspnea or chest pain on exertion. No abdominal pain, change in bowel habits, black or bloody stools. No urinary tract symptoms. No new or unusual musculoskeletal symptoms.     Specific concerns today: wants urine pregnancy, periods have been irregular and this month late ever since stopping breast feeding, on nuvaring but has not inserted since cycle has not started. Objective: The patient appears well, alert, oriented x 3, in no distress. Visit Vitals    BP (!) 132/98 (BP 1 Location: Left arm, BP Patient Position: Sitting)    Pulse 62    Temp 98.6 °F (37 °C) (Oral)    Resp 18    Ht 5' 6\" (1.676 m)    Wt 203 lb 3.2 oz (92.2 kg)    LMP 08/15/2018    SpO2 96%    BMI 32.8 kg/m2     ENT normal.  Neck supple. No adenopathy or thyromegaly. JENNY. Lungs are clear, good air entry, no wheezes, rhonchi or rales. S1 and S2 normal, no murmurs, regular rate and rhythm. Abdomen soft without tenderness, guarding, mass or organomegaly. Extremities show no edema, normal peripheral pulses. Neurological is normal, no focal findings. Breast and Pelvic exams are deferred. Assessment/Plan:   Well Woman  lose weight, increase physical activity, follow low fat diet, follow low salt diet, routine labs ordered  Encounter Diagnoses   Name Primary?  Well adult exam Yes    Amenorrhea     Allergic rhinitis, unspecified seasonality, unspecified trigger     Essential hypertension     Acute maxillary sinusitis, recurrence not specified      Orders Placed This Encounter    CBC WITH AUTOMATED DIFF    LIPID PANEL    METABOLIC PANEL, COMPREHENSIVE    TSH 3RD GENERATION    AMB POC URINE PREGNANCY TEST, VISUAL COLOR COMPARISON    cetirizine (ZYRTEC) 10 mg tablet    fluticasone (FLONASE) 50 mcg/actuation nasal spray    azithromycin (ZITHROMAX Z-BUD) 250 mg tablet    hydroCHLOROthiazide (HYDRODIURIL) 12.5 mg tablet     Urine pregnancy negative, go ahead and put in Nuvaring to help regulate cycle  Start hctz for htn as well as extra med  Adr/se reviewed  Follow-up Disposition: 4 weeks bp check    I have discussed the diagnosis with the patient and the intended plan as seen in the above orders. The patient has received an after-visit summary and questions were answered concerning future plans.  Patient conveyed understanding of the plan at the time of the visit.     Amber Burch, MSN, ANP  9/18/2018

## 2018-09-18 NOTE — PROGRESS NOTES
Pietro Jean is a 29 y.o. female      Fasting    Chief Complaint   Patient presents with    Complete Physical    Labs       1. Have you been to the ER, urgent care clinic since your last visit? Hospitalized since your last visit? Yes When: 07/2018 [facility] Reason for visit: Muscle spasm in chest  M  2. Have you seen or consulted any other health care providers outside of the [address] [place] [address] since your last visit? Include any pap smears or colon screening.  No      Visit Vitals    BP (!) 132/98 (BP 1 Location: Left arm, BP Patient Position: Sitting)    Pulse 62    Temp 98.6 °F (37 °C) (Oral)    Resp 18    Ht 5' 6\" (1.676 m)    Wt 203 lb 3.2 oz (92.2 kg)    SpO2 96%    BMI 32.8 kg/m2           Health Maintenance Due   Topic Date Due    DTaP/Tdap/Td series (1 - Tdap) 06/13/2011    PAP AKA CERVICAL CYTOLOGY  06/13/2011    Influenza Age 9 to Adult  08/01/2018

## 2018-09-19 LAB
ALBUMIN SERPL-MCNC: 4.7 G/DL (ref 3.5–5.5)
ALBUMIN/GLOB SERPL: 1.6 {RATIO} (ref 1.2–2.2)
ALP SERPL-CCNC: 55 IU/L (ref 39–117)
ALT SERPL-CCNC: 21 IU/L (ref 0–32)
AST SERPL-CCNC: 20 IU/L (ref 0–40)
BASOPHILS # BLD AUTO: 0 X10E3/UL (ref 0–0.2)
BASOPHILS NFR BLD AUTO: 1 %
BILIRUB SERPL-MCNC: 0.7 MG/DL (ref 0–1.2)
BUN SERPL-MCNC: 12 MG/DL (ref 6–20)
BUN/CREAT SERPL: 12 (ref 9–23)
CALCIUM SERPL-MCNC: 9.1 MG/DL (ref 8.7–10.2)
CHLORIDE SERPL-SCNC: 102 MMOL/L (ref 96–106)
CHOLEST SERPL-MCNC: 230 MG/DL (ref 100–199)
CO2 SERPL-SCNC: 22 MMOL/L (ref 20–29)
CREAT SERPL-MCNC: 0.97 MG/DL (ref 0.57–1)
EOSINOPHIL # BLD AUTO: 0.2 X10E3/UL (ref 0–0.4)
EOSINOPHIL NFR BLD AUTO: 3 %
ERYTHROCYTE [DISTWIDTH] IN BLOOD BY AUTOMATED COUNT: 13.4 % (ref 12.3–15.4)
GLOBULIN SER CALC-MCNC: 2.9 G/DL (ref 1.5–4.5)
GLUCOSE SERPL-MCNC: 86 MG/DL (ref 65–99)
HCT VFR BLD AUTO: 38.2 % (ref 34–46.6)
HDLC SERPL-MCNC: 53 MG/DL
HGB BLD-MCNC: 12.5 G/DL (ref 11.1–15.9)
IMM GRANULOCYTES # BLD: 0 X10E3/UL (ref 0–0.1)
IMM GRANULOCYTES NFR BLD: 0 %
INTERPRETATION, 910389: NORMAL
LDLC SERPL CALC-MCNC: 148 MG/DL (ref 0–99)
LYMPHOCYTES # BLD AUTO: 1.5 X10E3/UL (ref 0.7–3.1)
LYMPHOCYTES NFR BLD AUTO: 27 %
MCH RBC QN AUTO: 29.2 PG (ref 26.6–33)
MCHC RBC AUTO-ENTMCNC: 32.7 G/DL (ref 31.5–35.7)
MCV RBC AUTO: 89 FL (ref 79–97)
MONOCYTES # BLD AUTO: 0.5 X10E3/UL (ref 0.1–0.9)
MONOCYTES NFR BLD AUTO: 9 %
NEUTROPHILS # BLD AUTO: 3.3 X10E3/UL (ref 1.4–7)
NEUTROPHILS NFR BLD AUTO: 60 %
PLATELET # BLD AUTO: 209 X10E3/UL (ref 150–379)
POTASSIUM SERPL-SCNC: 4.2 MMOL/L (ref 3.5–5.2)
PROT SERPL-MCNC: 7.6 G/DL (ref 6–8.5)
RBC # BLD AUTO: 4.28 X10E6/UL (ref 3.77–5.28)
SODIUM SERPL-SCNC: 137 MMOL/L (ref 134–144)
TRIGL SERPL-MCNC: 143 MG/DL (ref 0–149)
TSH SERPL DL<=0.005 MIU/L-ACNC: 0.85 UIU/ML (ref 0.45–4.5)
VLDLC SERPL CALC-MCNC: 29 MG/DL (ref 5–40)
WBC # BLD AUTO: 5.4 X10E3/UL (ref 3.4–10.8)

## 2018-09-20 NOTE — PROGRESS NOTES
Hey there, your labs overall look good for sugar, thyroid and kidney/liver functions. Your cholesterol is very high. Watch the diet for red meat/pork, dairy products, and fried/fast foods. Recheck labs in 6 months fasting.    William Woody

## 2018-11-28 RX ORDER — LAMOTRIGINE 25 MG/1
25 TABLET ORAL DAILY
Qty: 30 TAB | Refills: 5 | Status: ON HOLD | OUTPATIENT
Start: 2018-11-28 | End: 2019-10-15

## 2018-11-28 RX ORDER — SERTRALINE HYDROCHLORIDE 50 MG/1
TABLET, FILM COATED ORAL
Qty: 30 TAB | Refills: 1 | Status: SHIPPED | OUTPATIENT
Start: 2018-11-28 | End: 2018-12-06 | Stop reason: SDUPTHER

## 2019-01-11 DIAGNOSIS — I10 ESSENTIAL HYPERTENSION: ICD-10-CM

## 2019-01-11 RX ORDER — METOPROLOL SUCCINATE 50 MG/1
TABLET, EXTENDED RELEASE ORAL
Qty: 30 TAB | Refills: 2 | Status: ON HOLD | OUTPATIENT
Start: 2019-01-11 | End: 2019-10-15

## 2019-03-26 LAB
ANTIBODY SCREEN, EXTERNAL: NEGATIVE
CHLAMYDIA, EXTERNAL: NEGATIVE
HBSAG, EXTERNAL: NEGATIVE
HIV, EXTERNAL: NON REACTIVE
N. GONORRHEA, EXTERNAL: NEGATIVE
RUBELLA, EXTERNAL: NORMAL
T. PALLIDUM, EXTERNAL: NON REACTIVE
TYPE, ABO & RH, EXTERNAL: NORMAL

## 2019-04-19 ENCOUNTER — HOSPITAL ENCOUNTER (EMERGENCY)
Age: 29
Discharge: HOME | End: 2019-04-19
Attending: EMERGENCY MEDICINE | Admitting: EMERGENCY MEDICINE
Payer: COMMERCIAL

## 2019-04-19 VITALS
BODY MASS INDEX: 33.91 KG/M2 | HEIGHT: 66 IN | TEMPERATURE: 98.3 F | RESPIRATION RATE: 14 BRPM | SYSTOLIC BLOOD PRESSURE: 158 MMHG | OXYGEN SATURATION: 96 % | WEIGHT: 211 LBS | HEART RATE: 94 BPM | DIASTOLIC BLOOD PRESSURE: 99 MMHG

## 2019-04-19 DIAGNOSIS — O10.011 PRE-EXISTING ESSENTIAL HYPERTENSION DURING PREGNANCY IN FIRST TRIMESTER: Primary | ICD-10-CM

## 2019-04-19 LAB
ALBUMIN SERPL-MCNC: 3.6 G/DL (ref 3.5–5)
ALBUMIN/GLOB SERPL: 0.8 {RATIO} (ref 1.1–2.2)
ALP SERPL-CCNC: 48 U/L (ref 45–117)
ALT SERPL-CCNC: 28 U/L (ref 12–78)
ANION GAP SERPL CALC-SCNC: 8 MMOL/L (ref 5–15)
APPEARANCE UR: CLEAR
AST SERPL-CCNC: 16 U/L (ref 15–37)
BASOPHILS # BLD: 0 K/UL (ref 0–0.1)
BASOPHILS NFR BLD: 0 % (ref 0–1)
BILIRUB SERPL-MCNC: 0.4 MG/DL (ref 0.2–1)
BILIRUB UR QL: NEGATIVE
BUN SERPL-MCNC: 14 MG/DL (ref 6–20)
BUN/CREAT SERPL: 12 (ref 12–20)
CALCIUM SERPL-MCNC: 9.6 MG/DL (ref 8.5–10.1)
CHLORIDE SERPL-SCNC: 103 MMOL/L (ref 97–108)
CO2 SERPL-SCNC: 24 MMOL/L (ref 21–32)
COLOR UR: NORMAL
CREAT SERPL-MCNC: 1.14 MG/DL (ref 0.55–1.02)
DIFFERENTIAL METHOD BLD: NORMAL
EOSINOPHIL # BLD: 0.2 K/UL (ref 0–0.4)
EOSINOPHIL NFR BLD: 2 % (ref 0–7)
ERYTHROCYTE [DISTWIDTH] IN BLOOD BY AUTOMATED COUNT: 12.7 % (ref 11.5–14.5)
GLOBULIN SER CALC-MCNC: 4.3 G/DL (ref 2–4)
GLUCOSE SERPL-MCNC: 126 MG/DL (ref 65–100)
GLUCOSE UR STRIP.AUTO-MCNC: NEGATIVE MG/DL
HCG SERPL-ACNC: ABNORMAL MIU/ML (ref 0–6)
HCT VFR BLD AUTO: 36.1 % (ref 35–47)
HGB BLD-MCNC: 12.1 G/DL (ref 11.5–16)
HGB UR QL STRIP: NEGATIVE
IMM GRANULOCYTES # BLD AUTO: 0 K/UL (ref 0–0.04)
IMM GRANULOCYTES NFR BLD AUTO: 0 % (ref 0–0.5)
KETONES UR QL STRIP.AUTO: NEGATIVE MG/DL
LEUKOCYTE ESTERASE UR QL STRIP.AUTO: NEGATIVE
LYMPHOCYTES # BLD: 1.5 K/UL (ref 0.8–3.5)
LYMPHOCYTES NFR BLD: 17 % (ref 12–49)
MCH RBC QN AUTO: 30 PG (ref 26–34)
MCHC RBC AUTO-ENTMCNC: 33.5 G/DL (ref 30–36.5)
MCV RBC AUTO: 89.4 FL (ref 80–99)
MONOCYTES # BLD: 0.7 K/UL (ref 0–1)
MONOCYTES NFR BLD: 8 % (ref 5–13)
NEUTS SEG # BLD: 6.6 K/UL (ref 1.8–8)
NEUTS SEG NFR BLD: 73 % (ref 32–75)
NITRITE UR QL STRIP.AUTO: NEGATIVE
NRBC # BLD: 0 K/UL (ref 0–0.01)
NRBC BLD-RTO: 0 PER 100 WBC
PH UR STRIP: 5.5 [PH] (ref 5–8)
PLATELET # BLD AUTO: 189 K/UL (ref 150–400)
PMV BLD AUTO: 9.8 FL (ref 8.9–12.9)
POTASSIUM SERPL-SCNC: 3.7 MMOL/L (ref 3.5–5.1)
PROT SERPL-MCNC: 7.9 G/DL (ref 6.4–8.2)
PROT UR STRIP-MCNC: NEGATIVE MG/DL
RBC # BLD AUTO: 4.04 M/UL (ref 3.8–5.2)
SODIUM SERPL-SCNC: 135 MMOL/L (ref 136–145)
SP GR UR REFRACTOMETRY: 1.02 (ref 1–1.03)
UR CULT HOLD, URHOLD: NORMAL
UROBILINOGEN UR QL STRIP.AUTO: 0.2 EU/DL (ref 0.2–1)
WBC # BLD AUTO: 9.1 K/UL (ref 3.6–11)

## 2019-04-19 PROCEDURE — 80053 COMPREHEN METABOLIC PANEL: CPT

## 2019-04-19 PROCEDURE — 81003 URINALYSIS AUTO W/O SCOPE: CPT

## 2019-04-19 PROCEDURE — 84702 CHORIONIC GONADOTROPIN TEST: CPT

## 2019-04-19 PROCEDURE — 85025 COMPLETE CBC W/AUTO DIFF WBC: CPT

## 2019-04-19 PROCEDURE — 36415 COLL VENOUS BLD VENIPUNCTURE: CPT

## 2019-04-19 PROCEDURE — 99284 EMERGENCY DEPT VISIT MOD MDM: CPT

## 2019-04-19 RX ORDER — ACETAMINOPHEN 500 MG
1000 TABLET ORAL ONCE
Status: DISCONTINUED | OUTPATIENT
Start: 2019-04-19 | End: 2019-04-19 | Stop reason: HOSPADM

## 2019-04-19 NOTE — DISCHARGE INSTRUCTIONS
Starting now increase your labetalol 200mg to twice daily. If you have consistent blood pressure readings >150/90 increase to three times a day. If you have worsening or persistent terrible headaches, vision changes, numbness/tingling or weakness in the body or other concerning symptoms return to the emergency room immediately. Follow-up with Dr. Rika Quintana on Monday for a check-up. Patient Education        High Blood Pressure in Pregnancy: Care Instructions  Your Care Instructions    High blood pressure (hypertension) means that the force of blood against your artery walls is too strong. Mild high blood pressure during pregnancy is not usually dangerous. Your doctor will probably just want to watch you closely. But when blood pressure is very high, it can reduce oxygen to your baby. This can affect how well your baby grows. High blood pressure also means that you are at higher risk for:  · Preeclampsia. This is a problem that includes high blood pressure and damage to your liver or kidneys. It can also reduce how much oxygen your baby gets. In some cases, it leads to eclampsia. Eclampsia causes seizures. · Placental abruption. This is a problem when the placenta separates from the uterus before birth. It prevents the baby from getting enough oxygen and nutrients. Sometimes it can cause death for the baby and the mother. To prevent problems for you or your baby, you will have to check your blood pressure often. You will do this until after your baby is born. If your blood pressure rises suddenly or is very high during your pregnancy, your doctor may prescribe medicines. They can usually control blood pressure. If your blood pressure affects your or your baby's health, your doctor may need to deliver your baby early. After your baby is born, your blood pressure will probably improve. But sometimes blood pressure problems continue after birth. Follow-up care is a key part of your treatment and safety.  Be sure to make and go to all appointments, and call your doctor if you are having problems. It's also a good idea to know your test results and keep a list of the medicines you take. How can you care for yourself at home? · Take and write down your blood pressure at home if your doctor tells you to. · Take your medicines exactly as prescribed. Call your doctor if you think you are having a problem with your medicine. · Do not smoke. If you need help quitting, talk to your doctor about stop-smoking programs and medicines. These can increase your chances of quitting for good. · Do not gain too much weight during your pregnancy. Talk to your doctor about how much weight gain is healthy. · Eat a healthy diet. · If your doctor says it's okay, get regular exercise. Walking or swimming several times a week can be healthy for you and your baby. · Reduce stress, and find time to relax. This is very important if you continue to work or have a busy schedule. It's also important if you have small children at home. When should you call for help? Call 911 anytime you think you may need emergency care. For example, call if:    · You passed out (lost consciousness).     · You have a seizure.    Call your doctor now or seek immediate medical care if:    · You have symptoms of preeclampsia, such as:  ? Sudden swelling of your face, hands, or feet. ? New vision problems (such as dimness or blurring). ? A severe headache.     · Your blood pressure is higher than it should be or rises suddenly.     · You have new nausea or vomiting.     · You think that you are in labor.     · You have pain in your belly or pelvis.    Watch closely for changes in your health, and be sure to contact your doctor if:    · You gain weight rapidly. Where can you learn more? Go to http://karthik-all.info/. Enter [phone] in the search box to learn more about \"High Blood Pressure in Pregnancy: Care Instructions. \"  Current as of: September 5, 2018  Content Version: 11.9  © 6703-5375 [employer]. Care instructions adapted under license by [employer] (which disclaims liability or warranty for this information). If you have questions about a medical condition or this instruction, always ask your healthcare professional. [address] any warranty or liability for your use of this information.

## 2019-04-19 NOTE — ED PROVIDER NOTES
I have evaluated the patient as the Provider in Triage. I have reviewed Her vital signs and the triage nurse assessment. I have talked with the patient and any available family and advised that I am the provider in triage and have ordered the appropriate study to initiate their work up based on the clinical presentation during my assessment. I have advised that the patient will be accommodated in the Main ED as soon as possible. I have also requested to contact the triage nurse or myself immediately if the patient experiences any changes in their condition during this brief waiting period. 29 y.o. 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 Pt states last Thursday her BP was 541H systolic, she called her OB this week to notify them and had a scheduled 'emergency' appointment this afternoon but the appointment was cancelled due to weather. Today at home her BP was 165/90. She c/o dull frontal headache was 7/10- now 2/10 with Tylenol, no vomiting, dizziness, tinnitus, abdominal pain, vaginal bleeding, vaginal discharge, nausea, or vomiting. Pt is currently taking labetalol 200mg nightly for the past month since being pregnant (was on HCTZ and metoprolol prior to pregnancy). She states she has been taking her labetalol as prescribed with last dose being last night. There are no other acute medical concerns at this time. OB: Lisa Barnes PCP: George Abbott NP Social hx: Never Smoker. Denies EtOH Use. Note written by Bethanie Raya, as dictated by Lindsey Kim DO 3:35 PM 
 
 
The history is provided by the patient. Past Medical History:  
Diagnosis Date  Bipolar 1 disorder ([facility] [address]  Hypertension  Migraine No past surgical history on file. Family History:  
Problem Relation Age of Onset  Hypertension Mother Shashank Villatoro Other Mother   
     fibromyalgia  Diabetes Mother  Sleep Apnea Mother  Hypertension Father Social History Socioeconomic History  Marital status:  Spouse name: Not on file  Number of children: Not on file  Years of education: Not on file  Highest education level: Not on file Occupational History  Not on file Social Needs  Financial resource strain: Not on file  Food insecurity:  
  Worry: Not on file Inability: Not on file  Transportation needs:  
  Medical: Not on file Non-medical: Not on file Tobacco Use  Smoking status: Never Smoker  Smokeless tobacco: Never Used Substance and Sexual Activity  Alcohol use: Yes Alcohol/week: 3.0 oz Types: 1 Glasses of wine, 4 Shots of liquor per week Comment:  wine daily, liquor on the weekends  Drug use: No  
 Sexual activity: Yes  
  Partners: Male Birth control/protection: Pill Lifestyle  Physical activity:  
  Days per week: Not on file Minutes per session: Not on file  Stress: Not on file Relationships  Social connections:  
  Talks on phone: Not on file Gets together: Not on file Attends Sikhism service: Not on file Active member of club or organization: Not on file Attends meetings of clubs or organizations: Not on file Relationship status: Not on file  Intimate partner violence:  
  Fear of current or ex partner: Not on file Emotionally abused: Not on file Physically abused: Not on file Forced sexual activity: Not on file Other Topics Concern  Not on file Social History Narrative  Not on file ALLERGIES: Imitrex [sumatriptan] Review of Systems Constitutional: Negative for activity change, appetite change, chills and fever. HENT: Negative for congestion, rhinorrhea, sinus pressure, sneezing and sore throat. Eyes: Negative for photophobia. Respiratory: Negative for cough and shortness of breath. Cardiovascular: Negative for chest pain. Gastrointestinal: Negative for abdominal pain, blood in stool, constipation, diarrhea, nausea and vomiting. Genitourinary: Negative for difficulty urinating, dysuria, flank pain, frequency, hematuria, menstrual problem, urgency, vaginal bleeding and vaginal discharge. Musculoskeletal: Negative for arthralgias, back pain, myalgias and neck pain. Skin: Negative for rash and wound. Neurological: Positive for headaches (dull, improved). Negative for syncope, weakness and numbness. Psychiatric/Behavioral: Negative for self-injury and suicidal ideas. All other systems reviewed and are negative. Vitals:  
 04/19/19 1536 BP: (!) 166/95 Pulse: 94 Resp: 14 Temp: 98.3 °F (36.8 °C) SpO2: 96% Weight: 95.7 kg (211 lb) Height: 5' 6\" (1.676 m) Physical Exam  
Constitutional: She is oriented to person, place, and time. She appears well-developed and well-nourished. She is active. Non-toxic appearance. No distress. AA female, elevated BMI HENT:  
Head: Normocephalic and atraumatic. Eyes: Pupils are equal, round, and reactive to light. Conjunctivae and EOM are normal. Right eye exhibits no discharge. Left eye exhibits no discharge. Neck: Normal range of motion and full passive range of motion without pain. Neck supple. No tracheal tenderness present. Cardiovascular: Normal rate, regular rhythm, normal heart sounds, intact distal pulses and normal pulses. Exam reveals no gallop and no friction rub. No murmur heard. Pulmonary/Chest: Effort normal and breath sounds normal. No respiratory distress. She has no wheezes. She has no rales. She exhibits no tenderness. Abdominal: Soft. Bowel sounds are normal. She exhibits no distension. There is no tenderness. There is no rebound and no guarding. Musculoskeletal: Normal range of motion. She exhibits no edema or tenderness. Neurological: She is alert and oriented to person, place, and time. She has normal strength. No cranial nerve deficit or sensory deficit. Coordination normal.  
Skin: Skin is warm, dry and intact. Capillary refill takes less than 2 seconds. No abrasion and no rash noted. She is not diaphoretic. No erythema. Psychiatric: She has a normal mood and affect. Her speech is normal and behavior is normal. Cognition and memory are normal.  
Nursing note and vitals reviewed. MDM Number of Diagnoses or Management Options Diagnosis management comments:  
Ddx: HTN in pregnancy, end organ dysfunction Amount and/or Complexity of Data Reviewed Clinical lab tests: ordered and reviewed Review and summarize past medical records: yes Discuss the patient with other providers: yes Patient Progress Patient progress: stable Procedures I discussed patient's PMH, exam findings as well as careplan with the ER attending who agrees with care plan. Teresa Deluna PA-C 
 
 
CONSULT NOTE:  
5:55 PM 
Teresa Deluna PA-C spoke with Dr. Matthew Carroll, Specialty: OB/GYN on call Discussed pt's hx, disposition, and available diagnostic and imaging results. Reviewed care plans. Consultant agrees with plans as outlined. She recommends to increase dosing of labetalol 200mg to BID, if patient has persistant readings of 150/100 or higher to increase dosing to TID and if she has a worsening or unrelieved headache, vision changes, extremity sx's to return to ER immediately. She also recommends OB f/u on Monday for re-exam. 
Written by Teresa Deluna PA-C. 
 
 
  
LABORATORY TESTS: 
Recent Results (from the past 12 hour(s)) CBC WITH AUTOMATED DIFF Collection Time: 04/19/19  3:50 PM  
Result Value Ref Range WBC 9.1 3.6 - 11.0 K/uL  
 RBC 4.04 3.80 - 5.20 M/uL  
 HGB 12.1 11.5 - 16.0 g/dL HCT 36.1 35.0 - 47.0 % MCV 89.4 80.0 - 99.0 FL  
 MCH 30.0 26.0 - 34.0 PG  
 MCHC 33.5 30.0 - 36.5 g/dL RDW 12.7 11.5 - 14.5 % PLATELET 957 664 - 998 K/uL MPV 9.8 8.9 - 12.9 FL  
 NRBC 0.0 0  WBC ABSOLUTE NRBC 0.00 0.00 - 0.01 K/uL NEUTROPHILS 73 32 - 75 % LYMPHOCYTES 17 12 - 49 % MONOCYTES 8 5 - 13 % EOSINOPHILS 2 0 - 7 % BASOPHILS 0 0 - 1 % IMMATURE GRANULOCYTES 0 0.0 - 0.5 % ABS. NEUTROPHILS 6.6 1.8 - 8.0 K/UL  
 ABS. LYMPHOCYTES 1.5 0.8 - 3.5 K/UL  
 ABS. MONOCYTES 0.7 0.0 - 1.0 K/UL  
 ABS. EOSINOPHILS 0.2 0.0 - 0.4 K/UL  
 ABS. BASOPHILS 0.0 0.0 - 0.1 K/UL  
 ABS. IMM. GRANS. 0.0 0.00 - 0.04 K/UL  
 DF AUTOMATED METABOLIC PANEL, COMPREHENSIVE Collection Time: 04/19/19  3:50 PM  
Result Value Ref Range Sodium 135 (L) 136 - 145 mmol/L Potassium 3.7 3.5 - 5.1 mmol/L Chloride 103 97 - 108 mmol/L  
 CO2 24 21 - 32 mmol/L Anion gap 8 5 - 15 mmol/L Glucose 126 (H) 65 - 100 mg/dL BUN 14 6 - 20 MG/DL Creatinine 1.14 (H) 0.55 - 1.02 MG/DL  
 BUN/Creatinine ratio 12 12 - 20 GFR est AA >60 >60 ml/min/1.73m2 GFR est non-AA 57 (L) >60 ml/min/1.73m2 Calcium 9.6 8.5 - 10.1 MG/DL Bilirubin, total 0.4 0.2 - 1.0 MG/DL  
 ALT (SGPT) 28 12 - 78 U/L  
 AST (SGOT) 16 15 - 37 U/L Alk. phosphatase 48 45 - 117 U/L Protein, total 7.9 6.4 - 8.2 g/dL Albumin 3.6 3.5 - 5.0 g/dL Globulin 4.3 (H) 2.0 - 4.0 g/dL A-G Ratio 0.8 (L) 1.1 - 2.2 BETA HCG, QT Collection Time: 04/19/19  3:50 PM  
Result Value Ref Range Beta HCG, QT 78,341 (H) 0 - 6 MIU/ML  
URINALYSIS W/ RFLX MICROSCOPIC Collection Time: 04/19/19  5:16 PM  
Result Value Ref Range Color YELLOW/STRAW Appearance CLEAR CLEAR Specific gravity 1.023 1.003 - 1.030    
 pH (UA) 5.5 5.0 - 8.0 Protein NEGATIVE  NEG mg/dL Glucose NEGATIVE  NEG mg/dL Ketone NEGATIVE  NEG mg/dL Bilirubin NEGATIVE  NEG Blood NEGATIVE  NEG Urobilinogen 0.2 0.2 - 1.0 EU/dL Nitrites NEGATIVE  NEG  Leukocyte Esterase NEGATIVE  NEG    
URINE CULTURE HOLD SAMPLE  
 Collection Time: 04/19/19  5:16 PM  
Result Value Ref Range Urine culture hold URINE ON HOLD IN MICROBIOLOGY DEPT FOR 3 DAYS. IF UNPRESERVED URINE IS SUBMITTED, IT CANNOT BE USED FOR ADDITIONAL TESTING AFTER 24 HRS, RECOLLECTION WILL BE REQUIRED. MEDICATIONS GIVEN: 
Medications  
acetaminophen (TYLENOL) tablet 1,000 mg (1,000 mg Oral Refused 4/19/19 0585) DISCHARGE NOTE: 
6:01 PM 
The patient's results have been reviewed with them and/or available family. Patient and/or family verbally conveyed their understanding and agreement of the patient's signs, symptoms, diagnosis, treatment and prognosis and additionally agree to follow up as recommended in the discharge instructions or to return to the Emergency Room should their condition change prior to their follow-up appointment. The patient/family verbally agrees with the care-plan and verbally conveys that all of their questions have been answered. The discharge instructions have also been provided to the patient and/or family with some educational information regarding the patient's diagnosis as well a list of reasons why the patient would want to return to the ER prior to their follow-up appointment, should their condition change. Plan: 
1. F/U with OB on Monday 2. Increase labetalol to BID, TID if persistent readings 150/100 or higher 3. Return precautions discussed and advised to return to ER if worse

## 2019-04-19 NOTE — ED TRIAGE NOTES
Elevating blood pressure readings today, was supposed to go to OB but their office is closed due to weather.

## 2019-08-16 LAB — GTT, 1 HR, GLUCOLA, EXTERNAL: 163

## 2019-10-10 LAB — GRBS, EXTERNAL: NEGATIVE

## 2019-10-14 ENCOUNTER — HOSPITAL ENCOUNTER (EMERGENCY)
Age: 29
Discharge: HOME | End: 2019-10-14
Attending: OBSTETRICS & GYNECOLOGY | Admitting: OBSTETRICS & GYNECOLOGY
Payer: COMMERCIAL

## 2019-10-14 VITALS
HEIGHT: 66 IN | TEMPERATURE: 97.9 F | DIASTOLIC BLOOD PRESSURE: 88 MMHG | SYSTOLIC BLOOD PRESSURE: 139 MMHG | WEIGHT: 221 LBS | HEART RATE: 98 BPM | BODY MASS INDEX: 35.52 KG/M2 | RESPIRATION RATE: 16 BRPM

## 2019-10-14 PROCEDURE — 99283 EMERGENCY DEPT VISIT LOW MDM: CPT

## 2019-10-14 PROCEDURE — 59025 FETAL NON-STRESS TEST: CPT

## 2019-10-14 RX ORDER — LABETALOL 100 MG/1
400 TABLET, FILM COATED ORAL 2 TIMES DAILY
Status: ON HOLD | COMMUNITY
End: 2019-10-20 | Stop reason: SDUPTHER

## 2019-10-14 NOTE — PROGRESS NOTES
1936: pt arrive to L&D with complaints of contractions every 25-30 minutes. Contractions began 2-4 hours ago and pt rates them at a 7/10 on the pain scale. Pt states her water is still intact, denies vaginal bleeding, and states she can feel baby move. Pt states her pregnancy is complicated by HTN and GDM. Diabetes is diet controlled. 1950: Pt placed on FHR monitor at this time for AST    2040: Dr. Yen Fields at pt bedside assessing and evaluating pt. MD discussing that pt is not in active labor at this time due to an unchanged cervix and contractions that are not consistent. MD VORB to get a complete NST and as long as baby is reactive the pt can be discharged. MD discussing discharge instructions and when to call the doctor. Pt verbalized understanding. 2112: NST completed at this time. Accelerations present, decelerations absent; NST reactive and verified with STEVE Barron RN    2120: At pt bedside discussing discharge instructions. Topics discussed include pregnancy precautions, when to call MD, kick counts, what to care at 36 weeks gestation, and the importance of hydration. Pt verbalized understanding and signed discharge instructions at this time. 2125: Pt walking off unit in stable condition with mother.

## 2019-10-15 ENCOUNTER — HOSPITAL ENCOUNTER (OUTPATIENT)
Age: 29
Setting detail: OBSERVATION
Discharge: HOME | End: 2019-10-16
Attending: OBSTETRICS & GYNECOLOGY | Admitting: OBSTETRICS & GYNECOLOGY
Payer: COMMERCIAL

## 2019-10-15 PROCEDURE — 36415 COLL VENOUS BLD VENIPUNCTURE: CPT

## 2019-10-15 PROCEDURE — 85025 COMPLETE CBC W/AUTO DIFF WBC: CPT

## 2019-10-15 PROCEDURE — 83615 LACTATE (LD) (LDH) ENZYME: CPT

## 2019-10-15 PROCEDURE — 80053 COMPREHEN METABOLIC PANEL: CPT

## 2019-10-15 PROCEDURE — 84550 ASSAY OF BLOOD/URIC ACID: CPT

## 2019-10-15 PROCEDURE — 84156 ASSAY OF PROTEIN URINE: CPT

## 2019-10-15 RX ORDER — BUTORPHANOL TARTRATE 2 MG/ML
2 INJECTION INTRAMUSCULAR; INTRAVENOUS
Status: DISCONTINUED | OUTPATIENT
Start: 2019-10-15 | End: 2019-10-16 | Stop reason: HOSPADM

## 2019-10-15 RX ORDER — NIFEDIPINE 30 MG/1
TABLET, EXTENDED RELEASE ORAL DAILY
Status: ON HOLD | COMMUNITY
End: 2019-10-20 | Stop reason: SDUPTHER

## 2019-10-15 RX ORDER — SODIUM CHLORIDE, SODIUM LACTATE, POTASSIUM CHLORIDE, CALCIUM CHLORIDE 600; 310; 30; 20 MG/100ML; MG/100ML; MG/100ML; MG/100ML
125 INJECTION, SOLUTION INTRAVENOUS CONTINUOUS
Status: DISCONTINUED | OUTPATIENT
Start: 2019-10-16 | End: 2019-10-16 | Stop reason: HOSPADM

## 2019-10-15 NOTE — H&P
Pt is a 34 y. o.female. [id number] EGA 36weeks 1 days The patient presents with complaint of contractions every 20-25 minutes. The patient denies LOF, vaginal bleeding, N/V/F/C. Pt reports good fetal movement. Pregnancy uncomplicated. Past Medical History:   Diagnosis Date    Asthma     Cold and exercise induced    Bipolar 1 disorder ([address]     Gestational diabetes     diet controlled    Hypertension     Migraine     Postpartum depression        Visit Vitals  /88   Pulse 98   Temp 97.9 °F (36.6 °C)   Resp 16   Ht 5' 6\" (1.676 m)   Wt 100.2 kg (221 lb)   BMI 35.67 kg/m²       No data found. EXAM:      Lungs- CTS bilaterally  CV- RRR  abd- gravid  Cervical Exam:  2 cm dilated    Membranes:  Intact  Uterine Activity: Frequency: Every 25 minutes  Fetal Heart Rate: Cat I tracing, marked variability, no decelerations     Labs:  No results found for this or any previous visit (from the past 12 hour(s)). No results found for this or any previous visit.         ASSESSMENT:    IUP @ 36 weeks  False labor        PLAN:    Discharge home  Labor not ongoing

## 2019-10-15 NOTE — DISCHARGE INSTRUCTIONS
Patient Education   Patient Education   Patient Education        Pregnancy Precautions: Care Instructions  Your Care Instructions    There is no sure way to prevent labor before your due date ( labor) or to prevent most other pregnancy problems. But there are things you can do to increase your chances of a healthy pregnancy. Go to your appointments, follow your doctor's advice, and take good care of yourself. Eat well, and exercise (if your doctor agrees). And make sure to drink plenty of water. Follow-up care is a key part of your treatment and safety. Be sure to make and go to all appointments, and call your doctor if you are having problems. It's also a good idea to know your test results and keep a list of the medicines you take. How can you care for yourself at home? · Make sure you go to your prenatal appointments. At each visit, your doctor will check your blood pressure. Your doctor will also check to see if you have protein in your urine. High blood pressure and protein in urine are signs of preeclampsia. This condition can be dangerous for you and your baby. · Drink plenty of fluids, enough so that your urine is light yellow or clear like water. Dehydration can cause contractions. If you have kidney, heart, or liver disease and have to limit fluids, talk with your doctor before you increase the amount of fluids you drink. · Tell your doctor right away if you notice any symptoms of an infection, such as:  ? Burning when you urinate. ? A foul-smelling discharge from your vagina. ? Vaginal itching. ? Unexplained fever. ? Unusual pain or soreness in your uterus or lower belly. · Eat a balanced diet. Include plenty of foods that are high in calcium and iron. ? Foods high in calcium include milk, cheese, yogurt, almonds, and broccoli. ? Foods high in iron include red meat, shellfish, poultry, eggs, beans, raisins, whole-grain bread, and leafy green vegetables. · Do not smoke.  If you need help quitting, talk to your doctor about stop-smoking programs and medicines. These can increase your chances of quitting for good. · Do not drink alcohol or use illegal drugs. · Follow your doctor's directions about activity. Your doctor will let you know how much, if any, exercise you can do. · Ask your doctor if you can have sex. If you are at risk for early labor, your doctor may ask you to not have sex. · Take care to prevent falls. During pregnancy, your joints are loose, and your balance is off. Sports such as bicycling, skiing, or in-line skating can increase your risk of falling. And don't ride horses or motorcycles, dive, water ski, scuba dive, or parachute jump while you are pregnant. · Avoid getting very hot. Do not use saunas or hot tubs. Avoid staying out in the sun in hot weather for long periods. Take acetaminophen (Tylenol) to lower a high fever. · Do not take any over-the-counter or herbal medicines or supplements without talking to your doctor or pharmacist first.  When should you call for help? Call 911 anytime you think you may need emergency care. For example, call if:    · You passed out (lost consciousness).     · You have a seizure.     · You have severe vaginal bleeding.     · You have severe pain in your belly or pelvis.     · You have had fluid gushing or leaking from your vagina and you know or think the umbilical cord is bulging into your vagina. If this happens, immediately get down on your knees so your rear end (buttocks) is higher than your head. This will decrease the pressure on the cord until help arrives.   [facility] your doctor now or seek immediate medical care if:    · You have signs of preeclampsia, such as:  ? Sudden swelling of your face, hands, or feet. ? New vision problems (such as dimness, blurring, or seeing spots).   ? A severe headache.     · You have any vaginal bleeding.     · You have belly pain or cramping.     · You have a fever.     · You have had regular contractions (with or without pain) for an hour. This means that you have 8 or more within 1 hour or 4 or more in 20 minutes after you change your position and drink fluids.     · You have a sudden release of fluid from your vagina.     · You have low back pain or pelvic pressure that does not go away.     · You notice that your baby has stopped moving or is moving much less than normal.    Watch closely for changes in your health, and be sure to contact your doctor if you have any problems. Where can you learn more? Go to http://karthik-all.info/. Enter [id number] in the search box to learn more about \"Pregnancy Precautions: Care Instructions. \"  Current as of: May 29, 2019  Content Version: 12.2  © 6667-1285 [employer]. Care instructions adapted under license by [employer] (which disclaims liability or warranty for this information). If you have questions about a medical condition or this instruction, always ask your healthcare professional. [place] [address] any warranty or liability for your use of this information. Counting Your Baby's Kicks: Care Instructions  Your Care Instructions    Counting your baby's kicks is one way your doctor can tell that your baby is healthy. Most women--especially in a first pregnancy--feel their baby move for the first time between 16 and 22 weeks. The movement may feel like flutters rather than kicks. Your baby may move more at certain times of the day. When you are active, you may notice less kicking than when you are resting. At your prenatal visits, your doctor will ask whether the baby is active. In your last trimester, your doctor may ask you to count the number of times you feel your baby move. Follow-up care is a key part of your treatment and safety. Be sure to make and go to all appointments, and call your doctor if you are having problems.  It's also a good idea to know your test results and keep a list of the medicines you take. How do you count fetal kicks? · A common method of checking your baby's movement is to count the number of kicks or moves you feel in 1 hour. Ten movements (such as kicks, flutters, or rolls) in 1 hour are normal. Some doctors suggest that you count in the morning until you get to 10 movements. Then you can quit for that day and start again the next day. · Pick your baby's most active time of day to count. This may be any time from morning to evening. · If you do not feel 10 movements in an hour, your baby may be sleeping. Wait for the next hour and count again. When should you call for help? Call your doctor now or seek immediate medical care if:    · You noticed that your baby has stopped moving or is moving much less than normal.    Watch closely for changes in your health, and be sure to contact your doctor if you have any problems. Where can you learn more? Go to http://karthik-all.info/. Enter V756 in the search box to learn more about \"Counting Your Baby's Kicks: Care Instructions. \"  Current as of: May 29, 2019  Content Version: 12.2  © 2890-8652 [employer]. Care instructions adapted under license by [employer] (which disclaims liability or warranty for this information). If you have questions about a medical condition or this instruction, always ask your healthcare professional. [place] [address] any warranty or liability for your use of this information. Weeks 34 to 36 of Your Pregnancy: Care Instructions  Your Care Instructions    By now, your baby and your belly have grown quite large. It is almost time to give birth. Your baby's lungs are almost ready to breathe air. The bones in your baby's head are now firm enough to protect it, but soft enough to move down through the birth canal.  You may feel excited, happy, anxious, or scared.  You may wonder how you will know if you are in labor or what to expect during labor. Try to be flexible in your expectations of the birth. Because each birth is different, there is no way to know exactly what childbirth will be like for you. This care sheet will help you know what to expect and how to prepare. This may make your childbirth easier. If you haven't already had the Tdap shot during this pregnancy, talk to your doctor about getting it. It will help protect your  against pertussis infection. In the 36th week, most women have a test for group B streptococcus (GBS). GBS is a common bacteria that can live in the vagina and rectum. It can make your baby sick after birth. If you test positive, you will get antibiotics during labor. The medicine will keep your baby from getting the bacteria. Follow-up care is a key part of your treatment and safety. Be sure to make and go to all appointments, and call your doctor if you are having problems. It's also a good idea to know your test results and keep a list of the medicines you take. How can you care for yourself at home? Learn about pain relief choices  · Pain is different for every woman. Talk with your doctor about your feelings about pain. · You can choose from several types of pain relief. These include medicine or breathing techniques, as well as comfort measures. You can use more than one option. · If you choose to have pain medicine during labor, talk to your doctor about your options. Some medicines lower anxiety and help with some of the pain. Others make your lower body numb so that you won't feel pain. · Be sure to tell your doctor about your pain medicine choice before you start labor or very early in your labor. You may be able to change your mind as labor progresses. · Rarely, a woman is put to sleep by medicine given through a mask or an IV. Labor and delivery  · The first stage of labor has three parts: early, active, and transition. ? Most women have early labor at home.  You can stay busy or rest, eat light snacks, drink clear fluids, and start counting contractions. ? When talking during a contraction gets hard, you may be moving to active labor. During active labor, you should head for the hospital if you are not there already. ? You are in active labor when contractions come every 3 to 4 minutes and last about 60 seconds. Your cervix is opening more rapidly. ? If your water breaks, contractions will come faster and stronger. ? During transition, your cervix is stretching, and contractions are coming more rapidly. ? You may want to push, but your cervix might not be ready. Your doctor will tell you when to push. · The second stage starts when your cervix is completely opened and you are ready to push. ? Contractions are very strong to push the baby down the birth canal.  ? You will feel the urge to push. You may feel like you need to have a bowel movement. ? You may be coached to push with contractions. These contractions will be very strong, but you will not have them as often. You can get a little rest between contractions. ? You may be emotional and irritable. You may not be aware of what is going on around you.  ? One last push, and your baby is born. · The third stage is when a few more contractions push out the placenta. This may take 30 minutes or less. · The fourth stage is the welcome recovery. You may feel overwhelmed with emotions and exhausted but alert. This is a good time to start breastfeeding. Where can you learn more? Go to http://karthik-all.info/. Enter S141 in the search box to learn more about \"Weeks 34 to 36 of Your Pregnancy: Care Instructions. \"  Current as of: May 29, 2019  Content Version: 12.2  © 3635-8141 [employer]. Care instructions adapted under license by [employer] (which disclaims liability or warranty for this information).  If you have questions about a medical condition or this instruction, always ask your healthcare professional. [place] [address] any warranty or liability for your use of this information.

## 2019-10-16 ENCOUNTER — HOSPITAL ENCOUNTER (INPATIENT)
Age: 29
LOS: 3 days | Discharge: HOME | End: 2019-10-20
Attending: OBSTETRICS & GYNECOLOGY | Admitting: OBSTETRICS & GYNECOLOGY
Payer: COMMERCIAL

## 2019-10-16 VITALS
RESPIRATION RATE: 16 BRPM | SYSTOLIC BLOOD PRESSURE: 135 MMHG | BODY MASS INDEX: 35.52 KG/M2 | HEIGHT: 66 IN | DIASTOLIC BLOOD PRESSURE: 95 MMHG | WEIGHT: 221 LBS | OXYGEN SATURATION: 98 % | HEART RATE: 98 BPM | TEMPERATURE: 98.1 F

## 2019-10-16 PROBLEM — Z34.90 PREGNANT: Status: ACTIVE | Noted: 2019-10-16

## 2019-10-16 LAB
ALBUMIN SERPL-MCNC: 2.5 G/DL (ref 3.5–5)
ALBUMIN SERPL-MCNC: 2.8 G/DL (ref 3.5–5)
ALBUMIN/GLOB SERPL: 0.7 {RATIO} (ref 1.1–2.2)
ALBUMIN/GLOB SERPL: 0.7 {RATIO} (ref 1.1–2.2)
ALP SERPL-CCNC: 68 U/L (ref 45–117)
ALP SERPL-CCNC: 77 U/L (ref 45–117)
ALT SERPL-CCNC: 15 U/L (ref 12–78)
ALT SERPL-CCNC: 19 U/L (ref 12–78)
ANION GAP SERPL CALC-SCNC: 8 MMOL/L (ref 5–15)
ANION GAP SERPL CALC-SCNC: 9 MMOL/L (ref 5–15)
AST SERPL-CCNC: 17 U/L (ref 15–37)
AST SERPL-CCNC: 25 U/L (ref 15–37)
BASOPHILS # BLD: 0 K/UL (ref 0–0.1)
BASOPHILS # BLD: 0 K/UL (ref 0–0.1)
BASOPHILS NFR BLD: 0 % (ref 0–1)
BASOPHILS NFR BLD: 0 % (ref 0–1)
BILIRUB SERPL-MCNC: 0.4 MG/DL (ref 0.2–1)
BILIRUB SERPL-MCNC: 0.5 MG/DL (ref 0.2–1)
BUN SERPL-MCNC: 10 MG/DL (ref 6–20)
BUN SERPL-MCNC: 9 MG/DL (ref 6–20)
BUN/CREAT SERPL: 11 (ref 12–20)
BUN/CREAT SERPL: 11 (ref 12–20)
CALCIUM SERPL-MCNC: 8.5 MG/DL (ref 8.5–10.1)
CALCIUM SERPL-MCNC: 8.7 MG/DL (ref 8.5–10.1)
CHLORIDE SERPL-SCNC: 107 MMOL/L (ref 97–108)
CHLORIDE SERPL-SCNC: 110 MMOL/L (ref 97–108)
CO2 SERPL-SCNC: 20 MMOL/L (ref 21–32)
CO2 SERPL-SCNC: 22 MMOL/L (ref 21–32)
CREAT SERPL-MCNC: 0.85 MG/DL (ref 0.55–1.02)
CREAT SERPL-MCNC: 0.87 MG/DL (ref 0.55–1.02)
CREAT UR-MCNC: 364 MG/DL
DIFFERENTIAL METHOD BLD: ABNORMAL
DIFFERENTIAL METHOD BLD: ABNORMAL
EOSINOPHIL # BLD: 0.1 K/UL (ref 0–0.4)
EOSINOPHIL # BLD: 0.1 K/UL (ref 0–0.4)
EOSINOPHIL NFR BLD: 1 % (ref 0–7)
EOSINOPHIL NFR BLD: 2 % (ref 0–7)
ERYTHROCYTE [DISTWIDTH] IN BLOOD BY AUTOMATED COUNT: 13 % (ref 11.5–14.5)
ERYTHROCYTE [DISTWIDTH] IN BLOOD BY AUTOMATED COUNT: 13.3 % (ref 11.5–14.5)
GLOBULIN SER CALC-MCNC: 3.6 G/DL (ref 2–4)
GLOBULIN SER CALC-MCNC: 3.8 G/DL (ref 2–4)
GLUCOSE SERPL-MCNC: 105 MG/DL (ref 65–100)
GLUCOSE SERPL-MCNC: 131 MG/DL (ref 65–100)
HCT VFR BLD AUTO: 29.4 % (ref 35–47)
HCT VFR BLD AUTO: 33 % (ref 35–47)
HGB BLD-MCNC: 10.7 G/DL (ref 11.5–16)
HGB BLD-MCNC: 9.6 G/DL (ref 11.5–16)
IMM GRANULOCYTES # BLD AUTO: 0 K/UL (ref 0–0.04)
IMM GRANULOCYTES # BLD AUTO: 0 K/UL (ref 0–0.04)
IMM GRANULOCYTES NFR BLD AUTO: 0 % (ref 0–0.5)
IMM GRANULOCYTES NFR BLD AUTO: 0 % (ref 0–0.5)
LDH SERPL L TO P-CCNC: 165 U/L (ref 81–246)
LDH SERPL L TO P-CCNC: 197 U/L (ref 81–246)
LYMPHOCYTES # BLD: 0.9 K/UL (ref 0.8–3.5)
LYMPHOCYTES # BLD: 1.4 K/UL (ref 0.8–3.5)
LYMPHOCYTES NFR BLD: 15 % (ref 12–49)
LYMPHOCYTES NFR BLD: 22 % (ref 12–49)
MCH RBC QN AUTO: 28.5 PG (ref 26–34)
MCH RBC QN AUTO: 28.8 PG (ref 26–34)
MCHC RBC AUTO-ENTMCNC: 32.4 G/DL (ref 30–36.5)
MCHC RBC AUTO-ENTMCNC: 32.7 G/DL (ref 30–36.5)
MCV RBC AUTO: 88 FL (ref 80–99)
MCV RBC AUTO: 88.3 FL (ref 80–99)
MONOCYTES # BLD: 0.6 K/UL (ref 0–1)
MONOCYTES # BLD: 0.7 K/UL (ref 0–1)
MONOCYTES NFR BLD: 11 % (ref 5–13)
MONOCYTES NFR BLD: 9 % (ref 5–13)
NEUTS SEG # BLD: 4.1 K/UL (ref 1.8–8)
NEUTS SEG # BLD: 4.8 K/UL (ref 1.8–8)
NEUTS SEG NFR BLD: 65 % (ref 32–75)
NEUTS SEG NFR BLD: 75 % (ref 32–75)
NRBC # BLD: 0 K/UL (ref 0–0.01)
NRBC # BLD: 0 K/UL (ref 0–0.01)
NRBC BLD-RTO: 0 PER 100 WBC
NRBC BLD-RTO: 0 PER 100 WBC
PLATELET # BLD AUTO: 164 K/UL (ref 150–400)
PLATELET # BLD AUTO: 174 K/UL (ref 150–400)
PMV BLD AUTO: 10.8 FL (ref 8.9–12.9)
PMV BLD AUTO: 11.3 FL (ref 8.9–12.9)
POTASSIUM SERPL-SCNC: 3.7 MMOL/L (ref 3.5–5.1)
POTASSIUM SERPL-SCNC: 4 MMOL/L (ref 3.5–5.1)
PROT SERPL-MCNC: 6.1 G/DL (ref 6.4–8.2)
PROT SERPL-MCNC: 6.6 G/DL (ref 6.4–8.2)
PROT UR-MCNC: 134 MG/DL (ref 0–11.9)
PROT/CREAT UR-RTO: 0.4
RBC # BLD AUTO: 3.33 M/UL (ref 3.8–5.2)
RBC # BLD AUTO: 3.75 M/UL (ref 3.8–5.2)
SODIUM SERPL-SCNC: 137 MMOL/L (ref 136–145)
SODIUM SERPL-SCNC: 139 MMOL/L (ref 136–145)
URATE SERPL-MCNC: 4.8 MG/DL (ref 2.6–6)
URATE SERPL-MCNC: 4.8 MG/DL (ref 2.6–6)
WBC # BLD AUTO: 6.3 K/UL (ref 3.6–11)
WBC # BLD AUTO: 6.5 K/UL (ref 3.6–11)

## 2019-10-16 PROCEDURE — 59025 FETAL NON-STRESS TEST: CPT

## 2019-10-16 PROCEDURE — 74011250636 HC RX REV CODE- 250/636: Performed by: OBSTETRICS & GYNECOLOGY

## 2019-10-16 PROCEDURE — 74011250637 HC RX REV CODE- 250/637: Performed by: OBSTETRICS & GYNECOLOGY

## 2019-10-16 PROCEDURE — 99285 EMERGENCY DEPT VISIT HI MDM: CPT

## 2019-10-16 PROCEDURE — 75410000002 HC LABOR FEE PER 1 HR: Performed by: OBSTETRICS & GYNECOLOGY

## 2019-10-16 PROCEDURE — 96374 THER/PROPH/DIAG INJ IV PUSH: CPT

## 2019-10-16 PROCEDURE — 74011000258 HC RX REV CODE- 258: Performed by: OBSTETRICS & GYNECOLOGY

## 2019-10-16 PROCEDURE — 99282 EMERGENCY DEPT VISIT SF MDM: CPT

## 2019-10-16 PROCEDURE — 80053 COMPREHEN METABOLIC PANEL: CPT

## 2019-10-16 PROCEDURE — 96375 TX/PRO/DX INJ NEW DRUG ADDON: CPT

## 2019-10-16 PROCEDURE — 99218 HC RM OBSERVATION: CPT

## 2019-10-16 PROCEDURE — 96360 HYDRATION IV INFUSION INIT: CPT

## 2019-10-16 PROCEDURE — 96361 HYDRATE IV INFUSION ADD-ON: CPT

## 2019-10-16 RX ORDER — BUTALBITAL, ACETAMINOPHEN AND CAFFEINE 50; 325; 40 MG/1; MG/1; MG/1
2 TABLET ORAL
Status: DISCONTINUED | OUTPATIENT
Start: 2019-10-16 | End: 2019-10-20 | Stop reason: HOSPADM

## 2019-10-16 RX ORDER — NIFEDIPINE 30 MG/1
30 TABLET, EXTENDED RELEASE ORAL DAILY
Status: DISCONTINUED | OUTPATIENT
Start: 2019-10-16 | End: 2019-10-16 | Stop reason: HOSPADM

## 2019-10-16 RX ORDER — BETAMETHASONE SODIUM PHOSPHATE AND BETAMETHASONE ACETATE 3; 3 MG/ML; MG/ML
12 INJECTION, SUSPENSION INTRA-ARTICULAR; INTRALESIONAL; INTRAMUSCULAR; SOFT TISSUE EVERY 24 HOURS
Status: DISCONTINUED | OUTPATIENT
Start: 2019-10-16 | End: 2019-10-17

## 2019-10-16 RX ORDER — LABETALOL 200 MG/1
400 TABLET, FILM COATED ORAL EVERY 12 HOURS
Status: DISCONTINUED | OUTPATIENT
Start: 2019-10-16 | End: 2019-10-20 | Stop reason: HOSPADM

## 2019-10-16 RX ORDER — ONDANSETRON 4 MG/1
4 TABLET, FILM COATED ORAL
Qty: 12 TAB | Refills: 0 | Status: SHIPPED | OUTPATIENT
Start: 2019-10-16 | End: 2019-10-20

## 2019-10-16 RX ORDER — LABETALOL 200 MG/1
400 TABLET, FILM COATED ORAL 2 TIMES DAILY
Status: DISCONTINUED | OUTPATIENT
Start: 2019-10-16 | End: 2019-10-16 | Stop reason: HOSPADM

## 2019-10-16 RX ORDER — HYDROXYZINE HYDROCHLORIDE 10 MG/1
10 TABLET, FILM COATED ORAL
Status: DISCONTINUED | OUTPATIENT
Start: 2019-10-16 | End: 2019-10-20 | Stop reason: HOSPADM

## 2019-10-16 RX ADMIN — BUTALBITAL, ACETAMINOPHEN, AND CAFFEINE 2 TABLET: 50; 325; 40 TABLET ORAL at 20:36

## 2019-10-16 RX ADMIN — PROMETHAZINE HYDROCHLORIDE 25 MG: 25 INJECTION INTRAMUSCULAR; INTRAVENOUS at 00:01

## 2019-10-16 RX ADMIN — LABETALOL HYDROCHLORIDE 400 MG: 200 TABLET, FILM COATED ORAL at 09:01

## 2019-10-16 RX ADMIN — FAMOTIDINE 20 MG: 10 INJECTION, SOLUTION INTRAVENOUS at 00:01

## 2019-10-16 RX ADMIN — BETAMETHASONE ACETATE AND BETAMETHASONE SODIUM PHOSPHATE 12 MG: 3; 3 INJECTION, SUSPENSION INTRA-ARTICULAR; INTRALESIONAL; INTRAMUSCULAR; SOFT TISSUE at 21:42

## 2019-10-16 RX ADMIN — SODIUM CHLORIDE, SODIUM LACTATE, POTASSIUM CHLORIDE, AND CALCIUM CHLORIDE 125 ML/HR: 600; 310; 30; 20 INJECTION, SOLUTION INTRAVENOUS at 00:01

## 2019-10-16 RX ADMIN — NIFEDIPINE 30 MG: 30 TABLET, FILM COATED, EXTENDED RELEASE ORAL at 09:01

## 2019-10-16 RX ADMIN — BUTORPHANOL TARTRATE 2 MG: 2 INJECTION, SOLUTION INTRAMUSCULAR; INTRAVENOUS at 00:01

## 2019-10-16 RX ADMIN — SODIUM CHLORIDE, SODIUM LACTATE, POTASSIUM CHLORIDE, AND CALCIUM CHLORIDE 125 ML/HR: 600; 310; 30; 20 INJECTION, SOLUTION INTRAVENOUS at 01:58

## 2019-10-16 RX ADMIN — LABETALOL HYDROCHLORIDE 400 MG: 200 TABLET, FILM COATED ORAL at 20:36

## 2019-10-16 NOTE — H&P
History & Physical    Name: Kendrick Vega MRN: [id number]  SSN: [id number]    YOB: 1990  Age: 34 y.o. Sex: female        Subjective:     Estimated Date of Delivery: 11/10/19  OB History        4    Para        Term                AB        Living           SAB        TAB        Ectopic        Molar        Multiple        Live Births                    Ms. Iniguez Boy is admitted with pregnancy at 36w3d for complit of contractions, nausea and an episode of diarrhea and vomiting. She has a history of chronic hypertension and is taking labetalolo daily. She denies headache, visual disturrbances, right upper quadrant pain or loss of fluid. Prenatal course was complicated by chronic hypertension. Please see prenatal records for details. Past Medical History:   Diagnosis Date    Asthma     Cold and exercise induced    Bipolar 1 disorder ([facility] [address]     Gestational diabetes     diet controlled    Hypertension     Migraine     Postpartum depression      History reviewed. No pertinent surgical history. Social History     Occupational History    Not on file   Tobacco Use    Smoking status: Never Smoker    Smokeless tobacco: Never Used   Substance and Sexual Activity    Alcohol use: Yes     Alcohol/week: 5.0 standard drinks     Types: 1 Glasses of wine, 4 Shots of liquor per week     Comment:  wine daily, liquor on the weekends    Drug use: No    Sexual activity: Yes     Partners: Male     Birth control/protection: Pill     Family History   Problem Relation Age of Onset    Hypertension Mother     Other Mother         fibromyalgia    Diabetes Mother     Sleep Apnea Mother     Hypertension Father        Allergies   Allergen Reactions    Imitrex [Sumatriptan] Nausea Only and Other (comments)     Blurred vision & hallucinations     Prior to Admission medications    Medication Sig Start Date End Date Taking?  Authorizing Provider   NIFEdipine ER (PROCARDIA XL) 30 mg ER tablet Take  by mouth daily. Yes Provider, Historical   labetalol (NORMODYNE) 100 mg tablet Take 400 mg by mouth two (2) times a day. Yes Provider, Historical   [id number] angie-folic-dha (PRENATAL DHA+COMPLETE PRENATAL) [id number] mg-mcg-mg cmpk Take  by mouth. Yes Provider, Historical   sertraline (ZOLOFT) 50 mg tablet TAKE 1 TABLET BY MOUTH DAILY. 12/7/18  Yes David , NP        Review of Systems: A comprehensive review of systems was negative except for that written in the HPI. Objective:     Vitals:  Vitals:    10/16/19 0021 10/16/19 0038 10/16/19 0108 10/16/19 0144   BP:  141/88 136/90 (!) 135/95   Pulse:  85 85 80   SpO2:  96% 96%    Weight: 100.2 kg (221 lb)      Height: 5' 6\" (1.676 m)           Physical Exam:  Patient without distress.   Heart: Regular rate and rhythm  Lung: clear to auscultation throughout lung fields, no wheezes, no rales, no rhonchi and normal respiratory effort  Abdomen: soft, nontender  Cervical Exam: 2 cm dilated    70% effaced    Lower Extremities:  - Edema No  Membranes:  Intact  Fetal Heart Rate: Category I tracing    Prenatal Labs:   Lab Results   Component Value Date/Time    Rubella, External Immune 03/26/2019    HBsAg, External Negative 03/26/2019    HIV, External Non Reactive 03/26/2019    Gonorrhea, External Negative 03/26/2019    Chlamydia, External Negative 03/26/2019     Recent Results (from the past 12 hour(s))   CBC WITH AUTOMATED DIFF    Collection Time: 10/15/19 11:54 PM   Result Value Ref Range    WBC 6.5 3.6 - 11.0 K/uL    RBC 3.75 (L) 3.80 - 5.20 M/uL    HGB 10.7 (L) 11.5 - 16.0 g/dL    HCT 33.0 (L) 35.0 - 47.0 %    MCV 88.0 80.0 - 99.0 FL    MCH 28.5 26.0 - 34.0 PG    MCHC 32.4 30.0 - 36.5 g/dL    RDW 13.0 11.5 - 14.5 %    PLATELET 719 624 - 950 K/uL    MPV 11.3 8.9 - 12.9 FL    NRBC 0.0 0  WBC    ABSOLUTE NRBC 0.00 0.00 - 0.01 K/uL    NEUTROPHILS 75 32 - 75 %    LYMPHOCYTES 15 12 - 49 %    MONOCYTES 9 5 - 13 %    EOSINOPHILS 1 0 - 7 %    BASOPHILS 0 0 - 1 %    IMMATURE GRANULOCYTES 0 0.0 - 0.5 %    ABS. NEUTROPHILS 4.8 1.8 - 8.0 K/UL    ABS. LYMPHOCYTES 0.9 0.8 - 3.5 K/UL    ABS. MONOCYTES 0.6 0.0 - 1.0 K/UL    ABS. EOSINOPHILS 0.1 0.0 - 0.4 K/UL    ABS. BASOPHILS 0.0 0.0 - 0.1 K/UL    ABS. IMM. GRANS. 0.0 0.00 - 0.04 K/UL    DF AUTOMATED     METABOLIC PANEL, COMPREHENSIVE    Collection Time: 10/15/19 11:54 PM   Result Value Ref Range    Sodium 137 136 - 145 mmol/L    Potassium 4.0 3.5 - 5.1 mmol/L    Chloride 107 97 - 108 mmol/L    CO2 22 21 - 32 mmol/L    Anion gap 8 5 - 15 mmol/L    Glucose 105 (H) 65 - 100 mg/dL    BUN 10 6 - 20 MG/DL    Creatinine 0.87 0.55 - 1.02 MG/DL    BUN/Creatinine ratio 11 (L) 12 - 20      GFR est AA >60 >60 ml/min/1.73m2    GFR est non-AA >60 >60 ml/min/1.73m2    Calcium 8.7 8.5 - 10.1 MG/DL    Bilirubin, total 0.5 0.2 - 1.0 MG/DL    ALT (SGPT) 19 12 - 78 U/L    AST (SGOT) 25 15 - 37 U/L    Alk. phosphatase 77 45 - 117 U/L    Protein, total 6.6 6.4 - 8.2 g/dL    Albumin 2.8 (L) 3.5 - 5.0 g/dL    Globulin 3.8 2.0 - 4.0 g/dL    A-G Ratio 0.7 (L) 1.1 - 2.2     LD    Collection Time: 10/15/19 11:54 PM   Result Value Ref Range     81 - 246 U/L   URIC ACID    Collection Time: 10/15/19 11:54 PM   Result Value Ref Range    Uric acid 4.8 2.6 - 6.0 MG/DL   PROTEIN/CREATININE RATIO, URINE    Collection Time: 10/15/19 11:54 PM   Result Value Ref Range    Protein, urine random 134 (H) 0.0 - 11.9 mg/dL    Creatinine, urine 364.00 mg/dL    Protein/Creat. urine Ratio 0.4         Assessment/Plan:     Plan: Admit for observation, possible induction due to elevated PCR compared to prenatal levels. Group B Strep was not tested.    She is anemic    Signed By:  Beverly Newman MD     October 16, 2019

## 2019-10-16 NOTE — DISCHARGE INSTRUCTIONS
[place] Contractions: Care Instructions  Your Care Instructions  [place] contractions prepare your uterus for labor. Think of them as a \"warm-up\" exercise that your body does. You may begin to feel them between the 28th and 30th weeks of your pregnancy. But they start as early as the 20th week. [place] Fox contractions usually occur more often during the ninth month. They may go away when you are active and return when you rest. These contractions are like mild contractions of true labor, but they occur less often. (You feel fewer than 8 in an hour.) They don't cause your cervix to open. It may be hard for you to tell the difference between [place] contractions and true labor, especially in your first pregnancy. Follow-up care is a key part of your treatment and safety. Be sure to make and go to all appointments, and call your doctor if you are having problems. It's also a good idea to know your test results and keep a list of the medicines you take. How can you care for yourself at home? · Try a warm bath to help relieve muscle tension and reduce pain. · Change positions every 30 minutes. Take breaks if you must sit for a long time. Get up and walk around. · Drink plenty of water, enough so that your urine is light yellow or clear like water. · Taking short walks may help you feel better. Your doctor needs to check any contractions that are getting stronger or closer together. Where can you learn more? Go to http://karthik-all.info/. Enter [id number] in the search box to learn more about \"[place]ieve Fox Contractions: Care Instructions. \"  Current as of: May 29, 2019  Content Version: 12.2  © 9091-9458 [employer]. Care instructions adapted under license by [employer] (which disclaims liability or warranty for this information).  If you have questions about a medical condition or this instruction, always ask your healthcare professional. [employer] disclaims any warranty or liability for your use of this information.     Preeclampsia: Care Instructions  Overview  Preeclampsia occurs when a woman's blood pressure rises during pregnancy. Often with preeclampsia, you also have swelling in your legs, hands, and face. A test may show too much protein in your urine. Preeclampsia is also called toxemia. If preeclampsia is severe and not treated, it can lead to seizures (eclampsia) and damage to your liver or kidneys. Preeclampsia can prevent your baby from getting enough food and oxygen. This can cause a low birth weight or other problems. Your doctor will watch you closely to prevent these problems. He or she also may recommend that you reduce your activity. If your preeclampsia is a danger to your health or the health of your baby, your doctor may need to deliver your baby early. While preeclampsia is a concern, most women with preeclampsia have healthy babies. After a woman gives birth, preeclampsia usually goes away on its own. But symptoms may last a few weeks or more and can get worse after delivery. Rarely, symptoms of preeclampsia don't show up until days or even weeks after childbirth. Follow-up care is a key part of your treatment and safety. Be sure to make and go to all appointments, and call your doctor if you are having problems. It's also a good idea to know your test results and keep a list of the medicines you take. How can you care for yourself at home? · Take and record your blood pressure at home if your doctor tells you to. ? Learn the importance of the two measures of blood pressure (such as 120 over 80, or 120/80). The first number is the systolic pressure, which is the force of blood on the artery walls as the heart pumps. The second number is the diastolic pressure, which is the force of blood on the artery walls between heartbeats, when the heart is at rest. You have a choice of monitors to use. ? Manual monitor:  You pump up the cuff and use a stethoscope to listen for your pulse. ? Electronic monitor: The cuff inflates, and a gauge shows your pulse rate. ? To take your blood pressure:  ? Ask your doctor to check your blood pressure monitor to be sure that it is accurate and that the cuff fits you. Also ask your doctor to watch you to make sure that you are using it right. ? You should not eat, use tobacco products, or use medicine known to raise blood pressure (such as some nasal decongestant sprays) before you take your blood pressure. ? Avoid taking your blood pressure if you have just exercised. Also avoid taking it if you are nervous or upset. Rest at least 15 minutes before you take your blood pressure. · If your doctor advises, check the protein levels in your urine. Your doctor or nurse will show you how to do this. · Take your medicines exactly as prescribed. Call your doctor if you think you are having a problem with your medicine. · Do not smoke. Quitting smoking will help improve your baby's growth and health. If you need help quitting, talk to your doctor about stop-smoking programs and medicines. These can increase your chances of quitting for good. · Eat a balanced and healthy diet that has lots of fruits and vegetables. · If your doctor advised bed rest, be sure to stay off your feet and rest as much as possible. ? Keep a phone, phone book, notepad, and pen near the bed where you can easily reach them. ? Gently stretch your legs every hour to maintain good blood flow. ? Have another family member pack snacks and lunch food in a cooler close to your bed. ? Use this time for activities that you usually cannot find time for, such as reading, craft projects, or letter writing. · You can keep track of your baby's health by noting the length of time it takes to count 10 movements (such as kicks, flutters, or rolls).  Feeling 10 movements in less than 1 hour is considered normal. Track your baby's movements once each day. Bring this record with you to each prenatal visit. When should you call for help? Call 911 anytime you think you may need emergency care. For example, call if:    · You passed out (lost consciousness).     · You have a seizure.    Call your doctor now or seek immediate medical care if:    · You have symptoms of preeclampsia, such as:  ? Sudden swelling of your face, hands, or feet. ? New vision problems (such as dimness, blurring, or seeing spots). ? A severe headache.     · Your blood pressure is higher than it should be, or it rises suddenly.     · You have new nausea or vomiting.     · You think that you are in labor.     · You have pain in your belly or pelvis.    Watch closely for changes in your health, and be sure to contact your doctor if:    · You gain weight rapidly. Where can you learn more? Go to http://karthikCalpianall.info/. Enter G760 in the search box to learn more about \"Preeclampsia: Care Instructions. \"  Current as of: May 29, 2019  Content Version: 12.2  © 5822-7555 [employer]. Care instructions adapted under license by [employer] (which disclaims liability or warranty for this information). If you have questions about a medical condition or this instruction, always ask your healthcare professional. [address] any warranty or liability for your use of this information. Pregnancy Precautions: Care Instructions  Your Care Instructions  There is no sure way to prevent labor before your due date ( labor) or to prevent most other pregnancy problems. But there are things you can do to increase your chances of a healthy pregnancy. Go to your appointments, follow your doctor's advice, and take good care of yourself. Eat well, and exercise (if your doctor agrees). And make sure to drink plenty of water. Follow-up care is a key part of your treatment and safety.  Be sure to make and go to all appointments, and call your doctor if you are having problems. It's also a good idea to know your test results and keep a list of the medicines you take. How can you care for yourself at home? · Make sure you go to your prenatal appointments. At each visit, your doctor will check your blood pressure. Your doctor will also check to see if you have protein in your urine. High blood pressure and protein in urine are signs of preeclampsia. This condition can be dangerous for you and your baby. · Drink plenty of fluids, enough so that your urine is light yellow or clear like water. Dehydration can cause contractions. If you have kidney, heart, or liver disease and have to limit fluids, talk with your doctor before you increase the amount of fluids you drink. · Tell your doctor right away if you notice any symptoms of an infection, such as:  ? Burning when you urinate. ? A foul-smelling discharge from your vagina. ? Vaginal itching. ? Unexplained fever. ? Unusual pain or soreness in your uterus or lower belly. · Eat a balanced diet. Include plenty of foods that are high in calcium and iron. ? Foods high in calcium include milk, cheese, yogurt, almonds, and broccoli. ? Foods high in iron include red meat, shellfish, poultry, eggs, beans, raisins, whole-grain bread, and leafy green vegetables. · Do not smoke. If you need help quitting, talk to your doctor about stop-smoking programs and medicines. These can increase your chances of quitting for good. · Do not drink alcohol or use illegal drugs. · Follow your doctor's directions about activity. Your doctor will let you know how much, if any, exercise you can do. · Ask your doctor if you can have sex. If you are at risk for early labor, your doctor may ask you to not have sex. · Take care to prevent falls. During pregnancy, your joints are loose, and your balance is off. Sports such as bicycling, skiing, or in-line skating can increase your risk of falling.  And don't ride horses or motorcycles, dive, water ski, scuba dive, or parachute jump while you are pregnant. · Avoid getting very hot. Do not use saunas or hot tubs. Avoid staying out in the sun in hot weather for long periods. Take acetaminophen (Tylenol) to lower a high fever. · Do not take any over-the-counter or herbal medicines or supplements without talking to your doctor or pharmacist first.  When should you call for help? Call 911 anytime you think you may need emergency care. For example, call if:    · You passed out (lost consciousness).     · You have a seizure.     · You have severe vaginal bleeding.     · You have severe pain in your belly or pelvis.     · You have had fluid gushing or leaking from your vagina and you know or think the umbilical cord is bulging into your vagina. If this happens, immediately get down on your knees so your rear end (buttocks) is higher than your head. This will decrease the pressure on the cord until help arrives.   [facility] your doctor now or seek immediate medical care if:    · You have signs of preeclampsia, such as:  ? Sudden swelling of your face, hands, or feet. ? New vision problems (such as dimness, blurring, or seeing spots). ? A severe headache.     · You have any vaginal bleeding.     · You have belly pain or cramping.     · You have a fever.     · You have had regular contractions (with or without pain) for an hour. This means that you have 8 or more within 1 hour or 4 or more in 20 minutes after you change your position and drink fluids.     · You have a sudden release of fluid from your vagina.     · You have low back pain or pelvic pressure that does not go away.     · You notice that your baby has stopped moving or is moving much less than normal.    Watch closely for changes in your health, and be sure to contact your doctor if you have any problems. Where can you learn more? Go to http://karthik-all.info/.   Enter [id number] in the search box to learn more about \"Pregnancy Precautions: Care Instructions. \"  Current as of: May 29, 2019  Content Version: 12.2  © 9387-3141 [employer]. Care instructions adapted under license by [employer] (which disclaims liability or warranty for this information). If you have questions about a medical condition or this instruction, always ask your healthcare professional. [address] any warranty or liability for your use of this information. Weeks 34 to 36 of Your Pregnancy: Care Instructions  Your Care Instructions    By now, your baby and your belly have grown quite large. It is almost time to give birth. Your baby's lungs are almost ready to breathe air. The bones in your baby's head are now firm enough to protect it, but soft enough to move down through the birth canal.  You may feel excited, happy, anxious, or scared. You may wonder how you will know if you are in labor or what to expect during labor. Try to be flexible in your expectations of the birth. Because each birth is different, there is no way to know exactly what childbirth will be like for you. This care sheet will help you know what to expect and how to prepare. This may make your childbirth easier. If you haven't already had the Tdap shot during this pregnancy, talk to your doctor about getting it. It will help protect your  against pertussis infection. In the 36th week, most women have a test for group B streptococcus (GBS). GBS is a common bacteria that can live in the vagina and rectum. It can make your baby sick after birth. If you test positive, you will get antibiotics during labor. The medicine will keep your baby from getting the bacteria. Follow-up care is a key part of your treatment and safety. Be sure to make and go to all appointments, and call your doctor if you are having problems. It's also a good idea to know your test results and keep a list of the medicines you take.   How can you care for yourself at home? Learn about pain relief choices  · Pain is different for every woman. Talk with your doctor about your feelings about pain. · You can choose from several types of pain relief. These include medicine or breathing techniques, as well as comfort measures. You can use more than one option. · If you choose to have pain medicine during labor, talk to your doctor about your options. Some medicines lower anxiety and help with some of the pain. Others make your lower body numb so that you won't feel pain. · Be sure to tell your doctor about your pain medicine choice before you start labor or very early in your labor. You may be able to change your mind as labor progresses. · Rarely, a woman is put to sleep by medicine given through a mask or an IV. Labor and delivery  · The first stage of labor has three parts: early, active, and transition. ? Most women have early labor at home. You can stay busy or rest, eat light snacks, drink clear fluids, and start counting contractions. ? When talking during a contraction gets hard, you may be moving to active labor. During active labor, you should head for the hospital if you are not there already. ? You are in active labor when contractions come every 3 to 4 minutes and last about 60 seconds. Your cervix is opening more rapidly. ? If your water breaks, contractions will come faster and stronger. ? During transition, your cervix is stretching, and contractions are coming more rapidly. ? You may want to push, but your cervix might not be ready. Your doctor will tell you when to push. · The second stage starts when your cervix is completely opened and you are ready to push. ? Contractions are very strong to push the baby down the birth canal.  ? You will feel the urge to push. You may feel like you need to have a bowel movement. ? You may be coached to push with contractions. These contractions will be very strong, but you will not have them as often. You can get a little rest between contractions. ? You may be emotional and irritable. You may not be aware of what is going on around you.  ? One last push, and your baby is born. · The third stage is when a few more contractions push out the placenta. This may take 30 minutes or less. · The fourth stage is the welcome recovery. You may feel overwhelmed with emotions and exhausted but alert. This is a good time to start breastfeeding. Where can you learn more? Go to http://karthik-all.info/. Enter H865 in the search box to learn more about \"Weeks 34 to 36 of Your Pregnancy: Care Instructions. \"  Current as of: May 29, 2019  Content Version: 12.2  © 3209-0674 [employer]. Care instructions adapted under license by [employer] (which disclaims liability or warranty for this information). If you have questions about a medical condition or this instruction, always ask your healthcare professional. [place] [address] any warranty or liability for your use of this information.

## 2019-10-16 NOTE — PROGRESS NOTES
Patient comfortable in bed. No contractions, desires to eat. Has good FM. No vision changes, headaches, increased lower extremity edema or RUQ pain. Visit Vitals  BP (!) 135/95   Pulse 98   Temp 98.1 °F (36.7 °C)   Resp 16   Ht 5' 6\" (1.676 m)   Wt 100.2 kg (221 lb)   LMP 2019 (Exact Date)   SpO2 98%   BMI 35.67 kg/m²   Bps 130-140s/80-90s. Once isolated /90, repeats normal.       CEFM: 145bpm, mod variability, +Accels, no decels. Reassurring    PIH labs: Pr/Cr 0.4, AST 25, ALT 19, Hgb 10.7, Plt 174    A/P: 28yo [id number] at 36.3 with SI PreE without severe features. Has CHTN on lab 400mg BID     - Discussed case with M Dr. Kirti Aburto. Agree with plan to discharge home with strict preE precauations  - Has appointment tomorrow with  surveillance. Encouraged to keep appointment  - Patient to take BP daily, instructed to call with Bps >160/110  - Cat 1 fetal tracing, reassurring, no contractions seen. Cervix unchanged.    -  Induction scheduled 10/20/19 at 6AM  - GBS neg    Discussed plan of care with patient, patient in agreement    Plan: discharge home, induction at 37.0    Danny Otero MD

## 2019-10-16 NOTE — PROGRESS NOTES
11:23 PM  Pt arrives from home c/o contractions since 2115. States had 1 episode of liquid diarrhea and vomiting x 1 prior to contractions starting. Has remained nauseated since 2115. Placed on EFM x 2. SVE 2/75/-2 ( unchanged from yesterday.)    11:35 PM  Dr Chandrika Viveros notified of pt arrival and complaint. Orders to send pre e labs and medicate with IV Stadol, Phenergan, Pepcid, and IVF. Plan of care discussed with patient and her mother. 12:13 AM  Pt feeling better after medication. Resting in bed with eyes closed. Will continue to monitor. 1:38 AM  Pt sleeping. Dr Chandrika Viveros updated on pt labs to include PCR of 0.4. Orders to admit to observation. 2:02 AM  Pt up to bathroom. Updated on plan of care. Denies pain at this time. Off monitor at this time. Eating peanut butter crackers. Will call if she has increased pain. 2:57 AM  Pt sleeping on left side. 3:56 AM  Dr Chandrika Viveros at bedside to discuss plan of care with patient. All questions answered.

## 2019-10-16 NOTE — ROUTINE PROCESS
1023 - Patient in bed resting with mother at bedside. Reports \"stomach churning\" and cramping \"starting up again\". Patient was able to rest with medication previously. Patient attempted to void \"not too long ago\" and was not able to. VSS, BP elevated at 144/92, patient reports pre-pregnancy medicated BPs run 130's-80's. Patient denies any HA, visual disturbances, sudden increase of swelling or RUQ pain. Will monitor serial BPs. Patient placed on EFM. 0730 - Patient turned on right sided hip tilt r/t variability at this time. Will continue to monitor and await MD to round on patient. Chris Zabala at bedside discussing plan of care. Rei Márquez for consult to [facility]. Patient has previously delivered at 36w3d with 2 previous deliveries, inquiring about induction today because she is miserable. VORB for patient BP meds 400mg labetalol BID and 30mg procardia XL daily. Patient requesting nausea medication to go home with, MD will discusses and plan accordingly if patient is DC home. Patient requesting PIV to be removed, MD will wait for discission from [facility]. MD reviewed FHR tracing. Lauren Skill from Dr. Xena Zabala to collected GBS culture, patient sitting up eating breakfast, US adjusted, will collect after finished eating, patient given BP medication to take this am. Patient desires to have induction on Sunday at 37 weeks instead of on Monday at 37w1d. 2240 - Patient scheduled for induction to come in at 0600 Nabeel 10/20 for [facility] with superimposed pre-eclampsia induction recommended by [facility]. 3465 - Dr. Xena Zabala just found result of GBS previously obtained is NEGATIVE. Will DC lab collection. 0920 - PIV removed. [facility] DC.  
 
3142 - I have reviewed discharge instructions with the patient, jenny torres, preeclampsia, pregnancy precautions, week 34-36 pregnancy. The patient verbalized understanding. 1005 - Patient ambulated off unit with  Discharge instructions and personal belongings.

## 2019-10-17 ENCOUNTER — ANESTHESIA EVENT (OUTPATIENT)
Dept: LABOR AND DELIVERY | Age: 29
End: 2019-10-17
Payer: COMMERCIAL

## 2019-10-17 ENCOUNTER — ANESTHESIA (OUTPATIENT)
Dept: LABOR AND DELIVERY | Age: 29
End: 2019-10-17
Payer: COMMERCIAL

## 2019-10-17 PROBLEM — Z34.90 TERM PREGNANCY: Status: ACTIVE | Noted: 2019-10-17

## 2019-10-17 LAB
ERYTHROCYTE [DISTWIDTH] IN BLOOD BY AUTOMATED COUNT: 13.4 % (ref 11.5–14.5)
HCT VFR BLD AUTO: 31.9 % (ref 35–47)
HGB BLD-MCNC: 10.5 G/DL (ref 11.5–16)
MCH RBC QN AUTO: 29 PG (ref 26–34)
MCHC RBC AUTO-ENTMCNC: 32.9 G/DL (ref 30–36.5)
MCV RBC AUTO: 88.1 FL (ref 80–99)
NRBC # BLD: 0 K/UL (ref 0–0.01)
NRBC BLD-RTO: 0 PER 100 WBC
PLATELET # BLD AUTO: 190 K/UL (ref 150–400)
PMV BLD AUTO: 11.4 FL (ref 8.9–12.9)
RBC # BLD AUTO: 3.62 M/UL (ref 3.8–5.2)
WBC # BLD AUTO: 9 K/UL (ref 3.6–11)

## 2019-10-17 PROCEDURE — 77030014125 HC TY EPDRL BBMI -B: Performed by: ANESTHESIOLOGY

## 2019-10-17 PROCEDURE — 65270000029 HC RM PRIVATE

## 2019-10-17 PROCEDURE — 74011250636 HC RX REV CODE- 250/636

## 2019-10-17 PROCEDURE — 99218 HC RM OBSERVATION: CPT

## 2019-10-17 PROCEDURE — 76819 FETAL BIOPHYS PROFIL W/O NST: CPT | Performed by: OBSTETRICS & GYNECOLOGY

## 2019-10-17 PROCEDURE — 74011000250 HC RX REV CODE- 250: Performed by: OBSTETRICS & GYNECOLOGY

## 2019-10-17 PROCEDURE — 74011250637 HC RX REV CODE- 250/637: Performed by: OBSTETRICS & GYNECOLOGY

## 2019-10-17 PROCEDURE — 74011000250 HC RX REV CODE- 250: Performed by: ANESTHESIOLOGY

## 2019-10-17 PROCEDURE — 75410000003 HC RECOV DEL/VAG/CSECN EA 0.5 HR: Performed by: OBSTETRICS & GYNECOLOGY

## 2019-10-17 PROCEDURE — 76060000078 HC EPIDURAL ANESTHESIA: Performed by: ANESTHESIOLOGY

## 2019-10-17 PROCEDURE — 76805 OB US >/= 14 WKS SNGL FETUS: CPT | Performed by: OBSTETRICS & GYNECOLOGY

## 2019-10-17 PROCEDURE — 77030011943

## 2019-10-17 PROCEDURE — 3E033VJ INTRODUCTION OF OTHER HORMONE INTO PERIPHERAL VEIN, PERCUTANEOUS APPROACH: ICD-10-PCS | Performed by: OBSTETRICS & GYNECOLOGY

## 2019-10-17 PROCEDURE — 75410000000 HC DELIVERY VAGINAL/SINGLE: Performed by: OBSTETRICS & GYNECOLOGY

## 2019-10-17 PROCEDURE — 00HU33Z INSERTION OF INFUSION DEVICE INTO SPINAL CANAL, PERCUTANEOUS APPROACH: ICD-10-PCS | Performed by: ANESTHESIOLOGY

## 2019-10-17 PROCEDURE — 36415 COLL VENOUS BLD VENIPUNCTURE: CPT

## 2019-10-17 PROCEDURE — 75410000002 HC LABOR FEE PER 1 HR: Performed by: OBSTETRICS & GYNECOLOGY

## 2019-10-17 PROCEDURE — 59025 FETAL NON-STRESS TEST: CPT

## 2019-10-17 PROCEDURE — 10907ZC DRAINAGE OF AMNIOTIC FLUID, THERAPEUTIC FROM PRODUCTS OF CONCEPTION, VIA NATURAL OR ARTIFICIAL OPENING: ICD-10-PCS | Performed by: OBSTETRICS & GYNECOLOGY

## 2019-10-17 PROCEDURE — 74011250636 HC RX REV CODE- 250/636: Performed by: OBSTETRICS & GYNECOLOGY

## 2019-10-17 PROCEDURE — 3E0P7VZ INTRODUCTION OF HORMONE INTO FEMALE REPRODUCTIVE, VIA NATURAL OR ARTIFICIAL OPENING: ICD-10-PCS | Performed by: OBSTETRICS & GYNECOLOGY

## 2019-10-17 PROCEDURE — 85027 COMPLETE CBC AUTOMATED: CPT

## 2019-10-17 RX ORDER — SODIUM CHLORIDE 0.9 % (FLUSH) 0.9 %
5-40 SYRINGE (ML) INJECTION EVERY 8 HOURS
Status: DISCONTINUED | OUTPATIENT
Start: 2019-10-17 | End: 2019-10-18 | Stop reason: ALTCHOICE

## 2019-10-17 RX ORDER — MISOPROSTOL 100 UG/1
25 TABLET ORAL ONCE
Status: COMPLETED | OUTPATIENT
Start: 2019-10-17 | End: 2019-10-17

## 2019-10-17 RX ORDER — SODIUM CHLORIDE 0.9 % (FLUSH) 0.9 %
5-40 SYRINGE (ML) INJECTION AS NEEDED
Status: DISCONTINUED | OUTPATIENT
Start: 2019-10-17 | End: 2019-10-20 | Stop reason: HOSPADM

## 2019-10-17 RX ORDER — FAMOTIDINE 20 MG/1
20 TABLET, FILM COATED ORAL DAILY
Status: DISCONTINUED | OUTPATIENT
Start: 2019-10-17 | End: 2019-10-20 | Stop reason: HOSPADM

## 2019-10-17 RX ORDER — MISOPROSTOL 200 UG/1
1000 TABLET ORAL ONCE
Status: COMPLETED | OUTPATIENT
Start: 2019-10-17 | End: 2019-10-17

## 2019-10-17 RX ORDER — PEPPERMINT OIL
SPIRIT ORAL ONCE
Status: DISPENSED | OUTPATIENT
Start: 2019-10-17 | End: 2019-10-18

## 2019-10-17 RX ORDER — MAGNESIUM SULFATE HEPTAHYDRATE 40 MG/ML
INJECTION, SOLUTION INTRAVENOUS
Status: COMPLETED
Start: 2019-10-17 | End: 2019-10-18

## 2019-10-17 RX ORDER — SODIUM CHLORIDE, SODIUM LACTATE, POTASSIUM CHLORIDE, CALCIUM CHLORIDE 600; 310; 30; 20 MG/100ML; MG/100ML; MG/100ML; MG/100ML
125 INJECTION, SOLUTION INTRAVENOUS CONTINUOUS
Status: DISCONTINUED | OUTPATIENT
Start: 2019-10-17 | End: 2019-10-18 | Stop reason: ALTCHOICE

## 2019-10-17 RX ORDER — EPHEDRINE SULFATE/0.9% NACL/PF 50 MG/5 ML
10 SYRINGE (ML) INTRAVENOUS
Status: DISCONTINUED | OUTPATIENT
Start: 2019-10-17 | End: 2019-10-18 | Stop reason: HOSPADM

## 2019-10-17 RX ORDER — MISOPROSTOL 200 UG/1
800 TABLET ORAL ONCE
Status: DISCONTINUED | OUTPATIENT
Start: 2019-10-17 | End: 2019-10-17

## 2019-10-17 RX ORDER — MAGNESIUM SULFATE HEPTAHYDRATE 40 MG/ML
2 INJECTION, SOLUTION INTRAVENOUS CONTINUOUS
Status: DISCONTINUED | OUTPATIENT
Start: 2019-10-17 | End: 2019-10-18 | Stop reason: ALTCHOICE

## 2019-10-17 RX ORDER — NIFEDIPINE 30 MG/1
30 TABLET, EXTENDED RELEASE ORAL DAILY
Status: DISCONTINUED | OUTPATIENT
Start: 2019-10-17 | End: 2019-10-20 | Stop reason: HOSPADM

## 2019-10-17 RX ORDER — CALCIUM CARBONATE 200(500)MG
200 TABLET,CHEWABLE ORAL
Status: DISCONTINUED | OUTPATIENT
Start: 2019-10-17 | End: 2019-10-17

## 2019-10-17 RX ORDER — TRANEXAMIC ACID 100 MG/ML
1 INJECTION, SOLUTION INTRAVENOUS ONCE
Status: COMPLETED | OUTPATIENT
Start: 2019-10-17 | End: 2019-10-17

## 2019-10-17 RX ORDER — MAGNESIUM SULFATE HEPTAHYDRATE 40 MG/ML
4 INJECTION, SOLUTION INTRAVENOUS ONCE
Status: COMPLETED | OUTPATIENT
Start: 2019-10-17 | End: 2019-10-18

## 2019-10-17 RX ORDER — FENTANYL/BUPIVACAINE/NS/PF 2-1250MCG
12 PREFILLED PUMP RESERVOIR EPIDURAL CONTINUOUS
Status: DISCONTINUED | OUTPATIENT
Start: 2019-10-17 | End: 2019-10-18 | Stop reason: ALTCHOICE

## 2019-10-17 RX ORDER — LIDOCAINE HYDROCHLORIDE AND EPINEPHRINE 20; 5 MG/ML; UG/ML
INJECTION, SOLUTION EPIDURAL; INFILTRATION; INTRACAUDAL; PERINEURAL AS NEEDED
Status: DISCONTINUED | OUTPATIENT
Start: 2019-10-17 | End: 2019-10-17 | Stop reason: HOSPADM

## 2019-10-17 RX ORDER — CALCIUM CARBONATE 200(500)MG
200 TABLET,CHEWABLE ORAL
Status: DISCONTINUED | OUTPATIENT
Start: 2019-10-17 | End: 2019-10-20 | Stop reason: HOSPADM

## 2019-10-17 RX ORDER — OXYTOCIN/0.9 % SODIUM CHLORIDE 30/500 ML
500 PLASTIC BAG, INJECTION (ML) INTRAVENOUS
Status: DISCONTINUED | OUTPATIENT
Start: 2019-10-18 | End: 2019-10-18 | Stop reason: ALTCHOICE

## 2019-10-17 RX ORDER — MAG HYDROX/ALUMINUM HYD/SIMETH 200-200-20
30 SUSPENSION, ORAL (FINAL DOSE FORM) ORAL
Status: DISCONTINUED | OUTPATIENT
Start: 2019-10-17 | End: 2019-10-18 | Stop reason: HOSPADM

## 2019-10-17 RX ADMIN — MISOPROSTOL 1000 MCG: 200 TABLET ORAL at 23:33

## 2019-10-17 RX ADMIN — MAGNESIUM SULFATE HEPTAHYDRATE 2 G/HR: 40 INJECTION, SOLUTION INTRAVENOUS at 23:00

## 2019-10-17 RX ADMIN — NIFEDIPINE 30 MG: 30 TABLET, FILM COATED, EXTENDED RELEASE ORAL at 09:09

## 2019-10-17 RX ADMIN — BUTALBITAL, ACETAMINOPHEN, AND CAFFEINE 2 TABLET: 50; 325; 40 TABLET ORAL at 11:29

## 2019-10-17 RX ADMIN — OXYTOCIN-SODIUM CHLORIDE 0.9% IV SOLN 30 UNIT/500ML 500 ML/HR: 30-0.9/5 SOLUTION at 23:22

## 2019-10-17 RX ADMIN — ALUMINUM HYDROXIDE AND MAGNESIUM HYDROXIDE 15 ML: 200; 200 SUSPENSION ORAL at 09:09

## 2019-10-17 RX ADMIN — LIDOCAINE HYDROCHLORIDE,EPINEPHRINE BITARTRATE 10 ML: 20; .005 INJECTION, SOLUTION EPIDURAL; INFILTRATION; INTRACAUDAL; PERINEURAL at 22:06

## 2019-10-17 RX ADMIN — MISOPROSTOL 25 MCG: 100 TABLET ORAL at 15:40

## 2019-10-17 RX ADMIN — MAGNESIUM SULFATE HEPTAHYDRATE 4 G: 40 INJECTION, SOLUTION INTRAVENOUS at 22:35

## 2019-10-17 RX ADMIN — SODIUM CHLORIDE, SODIUM LACTATE, POTASSIUM CHLORIDE, AND CALCIUM CHLORIDE 125 ML/HR: 600; 310; 30; 20 INJECTION, SOLUTION INTRAVENOUS at 19:39

## 2019-10-17 RX ADMIN — FAMOTIDINE 20 MG: 20 TABLET ORAL at 14:32

## 2019-10-17 RX ADMIN — BUTALBITAL, ACETAMINOPHEN, AND CAFFEINE 2 TABLET: 50; 325; 40 TABLET ORAL at 04:15

## 2019-10-17 RX ADMIN — LABETALOL HYDROCHLORIDE 400 MG: 200 TABLET, FILM COATED ORAL at 09:08

## 2019-10-17 RX ADMIN — LABETALOL HYDROCHLORIDE 400 MG: 200 TABLET, FILM COATED ORAL at 21:23

## 2019-10-17 RX ADMIN — BUTALBITAL, ACETAMINOPHEN, AND CAFFEINE 2 TABLET: 50; 325; 40 TABLET ORAL at 17:30

## 2019-10-17 RX ADMIN — Medication 12 ML/HR: at 22:37

## 2019-10-17 RX ADMIN — TRANEXAMIC ACID 1000 MG: 1 INJECTION, SOLUTION INTRAVENOUS at 23:22

## 2019-10-17 NOTE — PROGRESS NOTES
Case reviewed with Dr. Jamal Rosas [facility], appreciate assistance. Due to ongoing headache, will begin induction of labor due to superimposed PIH. Fetal monitoring and assessment today was normal and reassuring (BPP 10/10). EFW 3126g. /65   Pulse 94   Temp 98.3 °F (36.8 °C)   Resp 16   Ht 5' 6\" (1.676 m)   Wt 100.2 kg (221 lb)   LMP 01/27/2019 (Exact Date)   BMI 35.67 kg/m²     Cvx 50%/2cm/medium/posterior/-4    Will begin induction with 25mcg misoprostol, followed by pitocin/AROM. Magnesium for seizure prophylaxis in active phase of labor.

## 2019-10-17 NOTE — PROGRESS NOTES
0700-Received report from Lesvia Szymanski RN. Primary RN to room, pt sleeping at this time. Will check on patient again soon. 0809-Called MFM to notify patient has consult and needs to be seen today. A nurse will call to L&D unit when they are ready to see patient. 0828-Dr. Willams at bedside to see patient, notified pt is asking for something for acid reflux, reports her frontal headache is feeling better from last night, rates it 4/10 and is having shooting pain from her vagina up to her abdomen. 0835-Orders received from Dr. Everette Manzo to give patient tums 200 mg TID prn and maalox 15 ml QID if tums not helping PRN Indigestion. No further orders received. MD states he will receive a call from [facility] after consult to let him know what the next best plan of care should be.  0900-Pt notified MD ordered tums for patient and maalox if needed. Pt states tums do not work but she would try the maalox. 0930-Pt perinatology center on fifth floor at this time via wheelchair, accompanied by primary nurse and nurse student-Rui. 1045-Pt back to room from perinatology. Pt states she is \"very irritated because she was told she was going to see the specialist today but the specialist was not there\". Pt's midwife called pt personal phone and pt having conversation with her about her plan of care and why she is irritated. Primary RN leaving room to give pt privacy for her phone call. 1100-Pt states she is feeling abdominal pain that \"feels like jenny torres\". Primary nurse instructed pt to call when finished with her breakfast so she can have NST with fetal monitoring. Pt verbalizes understanding. 1120-Pt rates her frontal headache as 7/10 now and requests fioricet  1143-NST reviewed by RAFAEL Pearson, MIKE and primary nurse. NST reactive. 1410-Dr. Willams at bedside to see patient and discuss plan of care.   1413-VE 2/50/-4 by Dr. Everette Manzo  1533-Dr. Willams at bedside to place cytotec vaginally  1643-Pt up to bathroom. 1723-pt sitting up to have clear liquid tray, difficulty tracing FHR. Pt to call when finished eating so monitors can be adjusted. 1820-Dr. Willams at bedside discussing plan of care for the night, Dr. Marlena Allen taking over care of patient now, recheck cervix around 2000 to see if another dose of cytotec is needed or if pitocin can be started. Discussed pt birth plan and what to expect, pt verbalized understanding. Pt asks if she may walk around, MD ok with that as long as pt is on continuous EFM. Portable monitors at bedside and charged for use, pt verbalizes understanding. 1840-Dr. Sanchez called unit to notify she will be around to unit to check patient cervix around 2000 and to let night shift know. No further orders received.

## 2019-10-17 NOTE — PROGRESS NOTES
Patient slept well overnight, reports mild headache this morning \"3/10 but I don't want to take anything for it\". Denies VB, LOF, or ctx. Reports good FM. Denies RUQ pain, SOB, or significant visual changes. Patient Vitals for the past 24 hrs:   Temp Pulse Resp BP   10/17/19 0820 98.3 °F (36.8 °C) 86  140/69   10/17/19 0814 98.3 °F (36.8 °C) 86 16 140/69   10/17/19 0435  90  117/51   10/16/19 2142  92  112/60   10/16/19 2024  88  139/83   10/16/19 2007 98.2 °F (36.8 °C) 93 16 151/80     Fetal monitoring last night showed reactive NST; NST this AM pending. A/P 35 yo  @ 36w4d with Ressie Lusty without severe features. ? Cause of headache, patient with hx of migraine. BP normal to mild range overnight. HA improved with fioricet. S/p 1 dose BMZ last PM in anticipation of possible need for delivery. MFM consult this AM pending re: timing of delivery (now vs. 37 weeks).

## 2019-10-17 NOTE — H&P
History and Physical    Patient: Yuan Bowie MRN: [id number]  SSN: [id number]    YOB: 1990  Age: 34 y.o. Sex: female      Subjective:      Yuan Bowie is a 34 y.o. [id number] with new diagnosis SI PreE with Pr/Cr 0.4 earlier today. She had normal to mild range blood pressures during that admission with the exception of one severe range that was never repeated. She did not have headaches, vision changes, RUQ pain. Her [address] labs were normal with the exception of the above. She was discharged home with strict PIH precautions. She states when she was discharged, she was doing well until 3pm when she had a persistent headache. She has a history of migraines, and thought this may be one of them. She took tylenol 1g without relief. She called the on call nurse and told to get evaluated on L&D. Upon evaluation in triage, she states the headache is still present. She doesn't have nausea or vomiting. Notes slight blurring of her vision when the light is on. No RUQ pain. Pregnancy problems  - CHTN on lab 400mg BID and procardia 30mg XR, ASA 81mg  - bipolar  - UTI s/p tx and neg LULY  - chest pain- EKG wnl  - borderline 3hGTT- watching carbs  - anemia on iron  - itching- bile salts 4.0    Past Medical History:   Diagnosis Date    Asthma     Cold and exercise induced    Bipolar 1 disorder ([address]     Gestational diabetes     diet controlled    Hypertension     Migraine     Postpartum depression      History reviewed. No pertinent surgical history. Family History   Problem Relation Age of Onset    Hypertension Mother     Other Mother         fibromyalgia    Diabetes Mother     Sleep Apnea Mother     Hypertension Father      Social History     Tobacco Use    Smoking status: Never Smoker    Smokeless tobacco: Never Used   Substance Use Topics    Alcohol use:  Yes     Alcohol/week: 5.0 standard drinks     Types: 1 Glasses of wine, 4 Shots of liquor per week     Comment:  wine daily, liquor on the weekends      Prior to Admission medications    Medication Sig Start Date End Date Taking? Authorizing Provider   ondansetron hcl (ZOFRAN) 4 mg tablet Take 1 Tab by mouth every eight (8) hours as needed for Nausea. 10/16/19   Eli Boyle MD   NIFEdipine ER (PROCARDIA XL) 30 mg ER tablet Take  by mouth daily. Provider, Historical   labetalol (NORMODYNE) 100 mg tablet Take 400 mg by mouth two (2) times a day. Provider, Historical   [id number] angie-folic-dha (PRENATAL DHA+COMPLETE PRENATAL) [id number] mg-mcg-mg cmpk Take  by mouth. Provider, Historical   sertraline (ZOLOFT) 50 mg tablet TAKE 1 TABLET BY MOUTH DAILY. 18   Jeovany Walton NP        Allergies   Allergen Reactions    Imitrex [Sumatriptan] Nausea Only and Other (comments)     Blurred vision & hallucinations       Review of Systems:  A comprehensive review of systems was negative except for that written in the History of Present Illness. Objective:     Vitals:    10/16/19 2007 10/16/19 2024 10/16/19 2054   BP: 151/80 139/83    Pulse: 93 88    Resp: 16     Temp: 98.2 °F (36.8 °C)     Weight:   100.2 kg (221 lb)   Height:   5' 6\" (1.676 m)        Physical Exam:  Gen: alert and oriented X3   Resp:nonlabored respirations. Lungs CTAB  Cardiac: normal peripheral perfusion, RRR  Abdomen: soft, nontender, gravid  Ext: 1+ pitting edema    CEFM: 145bpm, mod variability, +Accels, no decels  Rodman: irritability      Assessment/plan:     30yo [id number] at 36.3 with hx migraines SI PreE presents for evaluation of headaches refractory to tylenol, concerning for severe features.     - resent [address] labs (no need to send Pr/Cr, already ruled in for PreE)  - give 2tab fioricet now  - Can give hydroxyzine PRN itching  - if fioricet doesn't improve headache, can give headache cocktail- details given to nurse  - start [address] course for  corticosteroids  - if headache resolve, can give benadryl for itching  - M consult in the morning if headache still persists to determine induction and initiation of magnesium    Re-evaluate in morning.  Patient discussed with hospitalist.        Signed By: Ethan Kat MD     October 16, 2019

## 2019-10-17 NOTE — PROGRESS NOTES
8:11 PM  Pt arrives c/o headache since 330pm today. Took Tylenol at 4pm without relief. /80. Has not taken her pm dose of Labetalol. Dr Roe Taveras notified and orders received for Fioricet 2 tab PRN, Labetalol 400mg now, CBC, CMP, uric acid, LD.    9:08 PM  Dr Roe Taveras at bedside to discuss plan of care with patient. 10:00 PM  Pt resting in bed with eyes closed. Awakened for beta shot. /60. States headache has improved. 12:30 AM  Resting in bed with eyes closed. 2:56 AM  Pt sleeping.

## 2019-10-17 NOTE — PROGRESS NOTES
Problem: Falls - Risk of  Goal: *Absence of Falls  Description  Document [facility] Fall Risk and appropriate interventions in the flowsheet.   Outcome: Progressing Towards Goal  Note:   Fall Risk Interventions:                                Problem: Patient Education: Go to Patient Education Activity  Goal: Patient/Family Education  Outcome: Progressing Towards Goal

## 2019-10-17 NOTE — PROGRESS NOTES
Indication: Hypertension, Pre-existingw/pre-eclampsia 3rd Tri O11.3.   ____________________________________________________________________________  History: Age: 34 years. : 3 Para: 2. Current Pregnancy: Pre- pregnancy data: Weight 220 lbs. Height 5 ft 6 ins. BMI 35.6.  ____________________________________________________________________________  Dating:  Stated EDC:  EDC: 11/10/19 GA by stated EDC: 36w4d  Current Scan on: 10/17/19 EDC: 19 GA by current scan: 35w3d  Best Overall Assessment: 10/17/19 EDC: 11/10/19 Assessed GA: 36w4d  The calculation of the gestational age by current scan was based on BPD, OFD, HC, AC, FL and HUM. The Best Overall Assessment is based on the stated EDC.  ____________________________________________________________________________  General Evaluation:  Fetal heart activity: present. Fetal heart rate: 134 bpm.   Presentation: cephalic. Fetal movement: visible. Amniotic Fluid: normal. TOM  17.9 cm. Maximal vertical pocket 5.4 cm. Cord: 3 vessels. Fetal cord insertion site: Normal.   Placenta: posterior. ____________________________________________________________________________  Anatomy Scan:  Santos gestation. Biometry:  BPD 88.4 mm 38th% 35w5d (34w5d to 36w5d)  .8 mm <5th% 34w2d (31w2d to 37w2d)  .0 mm 94th% 38w1d (37w1d to 39w2d)  FL 71.9 mm 54th% 36w6d (33w5d to 40w0d)  .8 mm <5th% 31w6d  HUM 59.9 mm 29th% 35w3d  EFW (lbs/oz) 6 lbs 14 ozs  EFW (g) 3126 g  62nd%       Fetal Anatomy:  Visualized with normal appearance: head, brain, abdominal wall, gastrointestinal tract, kidneys, bladder. Heart: The 4-chamber view was obtained but outflow tracts could not be adequately visualized. Genitalia: Female fetus. Summary of Ultrasound Findings:  Transabdominal US. U/S machine: [employer] E8 Expert. Impression:  The fetal anatomy survey appears normal and fetal growth is appropriate.    ____________________________________________________________________________  Fetal Wellbeing Assessment:  Amniotic fluid: normal. TOM: 17.9 cm. MVP: 5.4 cm. Q1: 5.0 cm. Q2: 5.4 cm. Q3: 4.8 cm. Q4: 2.7 cm. Biophysical Profile: Fetal body movements: normal (2), Fetal tone: normal (2), Fetal breathing movements: normal (2), Amniotic fluid volume: normal (2). Score 8 / 8.     ____________________________________________________________________________  Doppler:  Fetal Doppler:  Umbilical Artery: PS -64.0 cm/s    ED -27.01 cm/s    S/D ratio 2.04     RI 0.51     PI 0.67     TAMX -41.82 cm/s     U/S Machine: [employer] E8 Expert.  ____________________________________________________________________________  Report Summary:  Impression: This is a remote read:    Ms. Myah Garza is a 34yo  at 36w3 currently admitted with Pre-eclampsia. She had a single severe range BP yesterday that improved without medication, but other measurements are mild range. Labs are normal.      Today we see a SLIUP with biometry consistent with EDC. Normal visible anatomy, limited by gestational age. Normal fluid and movement. BPP is 8/8. Reassuring fetal surveillance. I called patient to review results and discuss findings. She reports a remote history of migraines, none this pregnancy, and feels this one is different. Some brief relief from fioricet but HA now returned. I informed patient that HA now qualifies her as severe. We discussed that this changes recommendation for delivery today. I recommended magnesium for seizure prophylaxis. I provided her time to ask questions and have them all answered. Recommendations: Please begin labor induction. No need to complete second betamethasone. Please start magnesium in active phase. An abbreviated postpartum course of magnesium (12 hours rather than 24) may be acceptable if there are no additional findings consistent with severe disease.

## 2019-10-17 NOTE — PROGRESS NOTES
Patient transferred to labor room. Resting comfortably. Reactive fetal tracing, no contractions noted on tocometer. 25mcg misoprostol placed into posterior vaginal fornix without difficulty. A/P 35 yo  @ 36w4d with CHTN and superimposed pre-eclampsia. Plan repeat cervical exam in 4-6 hours and proceed with either pitocin/AROM or repeat dose of misoprostol based on cervical check.

## 2019-10-18 PROCEDURE — 65270000029 HC RM PRIVATE

## 2019-10-18 PROCEDURE — 74011250637 HC RX REV CODE- 250/637: Performed by: OBSTETRICS & GYNECOLOGY

## 2019-10-18 PROCEDURE — 77030040361 HC SLV COMPR DVT MDII -B

## 2019-10-18 PROCEDURE — 74011250636 HC RX REV CODE- 250/636: Performed by: OBSTETRICS & GYNECOLOGY

## 2019-10-18 PROCEDURE — 77030005513 HC CATH URETH FOL11 MDII -B

## 2019-10-18 RX ORDER — OXYTOCIN/0.9 % SODIUM CHLORIDE 20/1000 ML
75 PLASTIC BAG, INJECTION (ML) INTRAVENOUS ONCE
Status: ACTIVE | OUTPATIENT
Start: 2019-10-18 | End: 2019-10-18

## 2019-10-18 RX ORDER — SIMETHICONE 80 MG
80 TABLET,CHEWABLE ORAL
Status: DISCONTINUED | OUTPATIENT
Start: 2019-10-18 | End: 2019-10-20 | Stop reason: HOSPADM

## 2019-10-18 RX ORDER — DIPHENHYDRAMINE HYDROCHLORIDE 50 MG/ML
12.5 INJECTION, SOLUTION INTRAMUSCULAR; INTRAVENOUS
Status: DISCONTINUED | OUTPATIENT
Start: 2019-10-18 | End: 2019-10-20 | Stop reason: HOSPADM

## 2019-10-18 RX ORDER — HYDROCORTISONE ACETATE PRAMOXINE HCL 2.5; 1 G/100G; G/100G
CREAM TOPICAL AS NEEDED
Status: DISCONTINUED | OUTPATIENT
Start: 2019-10-18 | End: 2019-10-20 | Stop reason: HOSPADM

## 2019-10-18 RX ORDER — HYDROCODONE BITARTRATE AND ACETAMINOPHEN 5; 325 MG/1; MG/1
1 TABLET ORAL
Status: DISCONTINUED | OUTPATIENT
Start: 2019-10-18 | End: 2019-10-20 | Stop reason: HOSPADM

## 2019-10-18 RX ORDER — SERTRALINE HYDROCHLORIDE 50 MG/1
50 TABLET, FILM COATED ORAL DAILY
Status: DISCONTINUED | OUTPATIENT
Start: 2019-10-19 | End: 2019-10-20 | Stop reason: HOSPADM

## 2019-10-18 RX ORDER — SWAB
1 SWAB, NON-MEDICATED MISCELLANEOUS DAILY
Status: DISCONTINUED | OUTPATIENT
Start: 2019-10-18 | End: 2019-10-20 | Stop reason: HOSPADM

## 2019-10-18 RX ORDER — ONDANSETRON 2 MG/ML
4 INJECTION INTRAMUSCULAR; INTRAVENOUS
Status: DISCONTINUED | OUTPATIENT
Start: 2019-10-18 | End: 2019-10-20 | Stop reason: HOSPADM

## 2019-10-18 RX ORDER — IBUPROFEN 800 MG/1
800 TABLET ORAL EVERY 8 HOURS
Status: DISCONTINUED | OUTPATIENT
Start: 2019-10-18 | End: 2019-10-20 | Stop reason: HOSPADM

## 2019-10-18 RX ORDER — DOCUSATE SODIUM 100 MG/1
100 CAPSULE, LIQUID FILLED ORAL
Status: DISCONTINUED | OUTPATIENT
Start: 2019-10-18 | End: 2019-10-20 | Stop reason: HOSPADM

## 2019-10-18 RX ORDER — NALOXONE HYDROCHLORIDE 0.4 MG/ML
0.4 INJECTION, SOLUTION INTRAMUSCULAR; INTRAVENOUS; SUBCUTANEOUS AS NEEDED
Status: DISCONTINUED | OUTPATIENT
Start: 2019-10-18 | End: 2019-10-20 | Stop reason: HOSPADM

## 2019-10-18 RX ADMIN — MAGNESIUM SULFATE HEPTAHYDRATE 2 G/HR: 40 INJECTION, SOLUTION INTRAVENOUS at 08:03

## 2019-10-18 RX ADMIN — DOCUSATE SODIUM 100 MG: 100 CAPSULE, LIQUID FILLED ORAL at 00:50

## 2019-10-18 RX ADMIN — LABETALOL HYDROCHLORIDE 400 MG: 200 TABLET, FILM COATED ORAL at 21:52

## 2019-10-18 RX ADMIN — Medication 10 ML: at 13:42

## 2019-10-18 RX ADMIN — LABETALOL HYDROCHLORIDE 400 MG: 200 TABLET, FILM COATED ORAL at 09:47

## 2019-10-18 RX ADMIN — IBUPROFEN 800 MG: 800 TABLET ORAL at 00:50

## 2019-10-18 RX ADMIN — HYDROCODONE BITARTRATE AND ACETAMINOPHEN 1 TABLET: 5; 325 TABLET ORAL at 00:50

## 2019-10-18 RX ADMIN — IBUPROFEN 800 MG: 800 TABLET ORAL at 08:03

## 2019-10-18 RX ADMIN — FAMOTIDINE 20 MG: 20 TABLET ORAL at 09:48

## 2019-10-18 RX ADMIN — ANTACID TABLETS 200 MG: 500 TABLET, CHEWABLE ORAL at 09:47

## 2019-10-18 RX ADMIN — HYDROCODONE BITARTRATE AND ACETAMINOPHEN 1 TABLET: 5; 325 TABLET ORAL at 05:45

## 2019-10-18 RX ADMIN — NIFEDIPINE 30 MG: 30 TABLET, FILM COATED, EXTENDED RELEASE ORAL at 09:48

## 2019-10-18 RX ADMIN — Medication 1 TABLET: at 09:46

## 2019-10-18 RX ADMIN — IBUPROFEN 800 MG: 800 TABLET ORAL at 16:22

## 2019-10-18 NOTE — ROUTINE PROCESS
Bedside and Verbal shift change report given to eri elise rn (oncoming nurse) by barbara deleon rn (offgoing nurse). Report included the following information SBAR, Kardex, Procedure Summary, Intake/Output, MAR, Accordion and Recent Results.

## 2019-10-18 NOTE — PROGRESS NOTES
The patient has recently received her epidural and is complaining of pain in a small area involving her left lower abdomen. Also complains of strong persistent vaginal pressure.   Visit Vitals  /79   Pulse 85   Temp 98.1 °F (36.7 °C)   Resp 16   Ht 5' 6\" (1.676 m)   Wt 100.2 kg (221 lb)   SpO2 98%   BMI 35.67 kg/m²     FHR: 150 moderate variability, accelerations, sporadic variable decelerations, early decelerations, cat 2  [place]: contractions q 1-4 minutes  Sve: 8-9/100/0 vtx     at 36 4/7 wks IOL for chronic htn with superimposed preeclampsia in the active phase of labor  Prepare for   Has already received magnesium sulfate bolus with maintenance dose currently being initiated

## 2019-10-18 NOTE — LACTATION NOTE
This note was copied from a baby's chart. 1530 - Infant is less than 24 hours old. Mother describes a good initial first feed and then baby has been suckling just briefly before falling asleep at the breast.  Normal  behaviors discussed. And early breastfeeding cues reviewed. Discussed with mother her plan for feeding. Reviewed the benefits of exclusive breast milk feeding during the hospital stay. Informed her of the risks of using formula to supplement in the first few days of life as well as the benefits of successful breast milk feeding; referred her to the Breastfeeding booklet about this information. She acknowledges understanding of information reviewed and states that it is her plan to breastfeed her infant. Will support her choice and offer additional information as needed. Hand Expression Education:  Mom taught how to manually hand express her colostrum. Emphasized the importance of providing infant with valuable colostrum as infant rests skin to skin at breast.  Aware to avoid extended periods of non-feeding. Aware to offer 10-20+ drops of colostrum every 2-3 hours until infant is latching and nursing effectively. Taught the rationale behind this low tech but highly effective evidence based practice. Pt will successfully establish breastfeeding by feeding in response to early feeding cues   or wake every 3h, will obtain deep latch, and will keep log of feedings/output. Taught to BF at hunger cues and or q 2-3 hrs and to offer 10-20 drops of hand expressed colostrum at any non-feeds.       Breast Assessment  Left Breast: Large  Left Nipple: Everted, Intact  Right Breast: Large  Right Nipple: Everted, Intact  Breast- Feeding Assessment  Attends Breast-Feeding Classes: No  Breast-Feeding Experience: Yes(nursed now 1year old child for 18 months)  Breast Trauma/Surgery: No  Type/Quality: Fair(per mother)  Lactation Consultant Visits  Breast-Feedings: Good   Mother/Infant Observation  Mother Observation: Alignment, Breast comfortable, Close hold, Holds breast, Lets baby end feeding, Recognizes feeding cues  Infant Observation: Audible swallows, Breast tissue moves, Lips flanged, lower, Lips flanged, upper, Opens mouth  LATCH Documentation  Latch: Repeated attempts, hold nipple in mouth, stimulate to suck  Audible Swallowing: A few with stimulation  Type of Nipple: Everted (after stimulation)  Comfort (Breast/Nipple): Soft/non-tender  Hold (Positioning): No assist from staff, mother able to position/hold infant  LATCH Score: 8

## 2019-10-18 NOTE — ANESTHESIA PREPROCEDURE EVALUATION
Relevant Problems   No relevant active problems       Anesthetic History   No history of anesthetic complications            Review of Systems / Medical History  Patient summary reviewed, nursing notes reviewed and pertinent labs reviewed    Pulmonary  Within defined limits          Asthma        Neuro/Psych   Within defined limits      Headaches and psychiatric history     Cardiovascular  Within defined limits  Hypertension              Exercise tolerance: >4 METS     GI/Hepatic/Renal  Within defined limits              Endo/Other  Within defined limits  Diabetes         Other Findings   Comments: bipolar           Physical Exam    Airway  Mallampati: II    Neck ROM: normal range of motion   Mouth opening: Normal     Cardiovascular  Regular rate and rhythm,  S1 and S2 normal,  no murmur, click, rub, or gallop  Rhythm: regular  Rate: normal         Dental  No notable dental hx       Pulmonary  Breath sounds clear to auscultation               Abdominal  GI exam deferred       Other Findings            Anesthetic Plan    ASA: 2  Anesthesia type: epidural      Post-op pain plan if not by surgeon: indwelling epidural catheter      Anesthetic plan and risks discussed with: Patient      Late entry - preop done, questions answered, and consent obtained prior to procedure; note entered after procedure at 10:08 PM

## 2019-10-18 NOTE — PROGRESS NOTES
26- Report from FARZANEH Blackmon 106, R.N.    0820-Morning rounds completed. Genesis care done and pads and chux changed. 1100- Magnesium discontinued per MD order. Arango removed without complications.

## 2019-10-18 NOTE — L&D DELIVERY NOTE
Delivery Summary    Patient: Zuleyma Aragon MRN: [id number]  SSN: [id number]    YOB: 1990  Age: 34 y.o. Sex: female       Information for the patient's :  Faizan Meyer [[id number]]       Labor Events:    Labor: No    Steroids: Partial Course   Cervical Ripening Date/Time:       Cervical Ripening Type: Misoprostol   Antibiotics During Labor: No   Rupture Identifier:      Rupture Date/Time: 10/17/2019 8:06 PM   Rupture Type: AROM   Amniotic Fluid Volume: Scant    Amniotic Fluid Description: Clear    Amniotic Fluid Odor: None    Induction: AROM       Induction Date/Time:        Indications for Induction: Hypertension    Augmentation:     Augmentation Date/Time:      Indications for Augmentation:     Labor complications: None       Additional complications:        Delivery Events:  Indications For Episiotomy:     Episiotomy: None   Perineal Laceration(s): None   Repaired:     Periurethral Laceration Location:      Repaired:     Labial Laceration Location:     Repaired:     Sulcal Laceration Location:     Repaired:     Vaginal Laceration Location:     Repaired:     Cervical Laceration Location:     Repaired:     Repair Suture: None   Number of Repair Packets:     Estimated Blood Loss (ml):  ml     Delivery Date: 10/17/2019    Delivery Time: 11:20 PM  Delivery Type: Vaginal, Spontaneous  Sex:  Female    Gestational Age: 37w2d   Delivery Clinician:  Tc Mcginnis  Living Status: Living   Delivery Location: L&D Room 211          APGARS  One minute Five minutes Ten minutes   Skin color:    1        Heart rate:    2        Grimace:    2        Muscle tone:    2        Breathin        Totals:    9            Presentation: Vertex    Position:        Resuscitation Method:  Tactile Stimulation;Suctioning-bulb     Meconium Stained: None      Cord Information: 3 Vessels  Complications: None  Cord around:    Delayed cord clamping?  Yes  Cord clamped date/time:10/17/2019 11:21 PM  Disposition of Cord Blood: Discard    Blood Gases Sent?: No    Placenta:  Date/Time: 10/17/2019 11:24 PM  Removal: Expressed      Appearance: Normal      Measurements:  Birth Weight:        Birth Length:        Head Circumference:        Chest Circumference:       Abdominal Girth: Other Providers:   Marv SINGH;ANGEL JAMISON;JESSICA VILLATORO;ROZ POSADA;Mari CEDEÑO, Obstetrician;Primary Nurse;Primary  Nurse;Charge Nurse;Staff Nurse; Anesthesiologist;Crna;Nurse Practitioner;Midwife;Nursery Nurse           Group B Strep:   Lab Results   Component Value Date/Time    GrBStrep, External negative 10/10/2019     Information for the patient's :  Ruth Dietz [[id number]]   No results found for: ABORH, PCTABR, PCTDIG, BILI, ABORHEXT, ABORH    No results for input(s): PCO2CB, PO2CB, HCO3I, SO2I, IBD, PTEMPI, SPECTI, PHICB, ISITE, IDEV, IALLEN in the last 72 hours. Additional Comments:    of a  viable female infant in [address] [place] [address] Sw presentation over an intact perineum with apgars 8,9. The infant was placed on the mother's abdomen after delivery and clamping of the umbilical cord was delayed for 1 minute. The umbilical cord was then doubly clamped and was cut by the FOB. TXA 1 gm iv was administered. The placenta delivered spontaneously and intact after uterine massage and gentle traction on the umbilical cord. Misoprostil 1000 micrograms rectal was administered for uterine atony prophylaxis. Arango catheter was inserted.  ml.

## 2019-10-18 NOTE — PROGRESS NOTES
The patient is resting comfortably. As per the patient's nurse, the patient's headache has resolved and she has no other preeclamptic symptoms. Visit Vitals  /77   Pulse 90   Temp 98.3 °F (36.8 °C)   Resp 16   Ht 5' 6\" (1.676 m)   Wt 100.2 kg (221 lb)   SpO2 97%   Breastfeeding?  Unknown   BMI 35.67 kg/m²     Urine output: 200-500 ml/hr    PPD 1 s/p  at 36 4/7 wks, chronic htn with superimposed preeclampsia, diuresing well, stable blood pressures  Continue magnesium sulfate at 2 gm/hr  Discontinue at 11:00 am if patient lacks symptomatology c/w with severe features of preeclampsia

## 2019-10-18 NOTE — PROGRESS NOTES
I have received SBAR from Dr. Easton Brooke, have assumed care of the patient, and have introduced myself to the patient and her family. The patient is currently having regular contractions that she rates 8/10 that are becoming increasingly more uncomfortable. Also complains of a frontal headache associated with vision changes and feels that both of these symptoms have improved after receiving Fioricet. Denies nausea, vomiting, ruq pain, and epigastric pain. Visit Vitals  /77   Pulse 90   Temp 98.7 °F (37.1 °C)   Resp 16   Ht 5' 6\" (1.676 m)   Wt 100.2 kg (221 lb)   BMI 35.67 kg/m²     FHR: 150 moderate variability, accelerations present, no decelerations, cat 1  Caro: contractions q 1-2 minutes  Cvs: nrr  Lungs: cta bilaterally  Abdomen: soft, gravid, non tender, EFW 7 pounds  Sve: 2-3/50/-3 mid position, adequate pelvimetry, amniotomy with small amount of clear fluid  Ext: no edema, dtr 2+    Recent Results (from the past 12 hour(s))   CBC W/O DIFF    Collection Time: 10/17/19  3:17 PM   Result Value Ref Range    WBC 9.0 3.6 - 11.0 K/uL    RBC 3.62 (L) 3.80 - 5.20 M/uL    HGB 10.5 (L) 11.5 - 16.0 g/dL    HCT 31.9 (L) 35.0 - 47.0 %    MCV 88.1 80.0 - 99.0 FL    MCH 29.0 26.0 - 34.0 PG    MCHC 32.9 30.0 - 36.5 g/dL    RDW 13.4 11.5 - 14.5 %    PLATELET 664 070 - 240 K/uL    MPV 11.4 8.9 - 12.9 FL    NRBC 0.0 0  WBC    ABSOLUTE NRBC 0.00 0.00 - 0.01 K/uL     Ass/Plan:  at 36 4/7 wks IOL secondary to  chronic hypertension with superimposed preeclampsia, Sabianist refusing all blood products, GBS negative, cat 1 fetal tracing    1.  Chronic hypertension with superimposed preeclampsia   Cervical ripening with Misoprostil that was initiated at approximately 1420  Amniotomy performed  Patient is currently has an adequate contraction pattern but will initiate pitocin prn  Magnesium sulfate will be initiated for seizure prophylaxis once the patient is in the active phase of labor and will be continued for 12-24 hrs post partum. Informed that magnesium sulfate may be initiated earlier should she have evidence of worsening preeclampsia. Labetalol 400 mg po q 12 hrs  Nifedipine xl 30 mg po daily    2. Catholic refusing all blood products  Patient informed that she is at increased risk for uterine atony in light of labor induction, and eventual magnesium sulfate therapy. Will administer TXA and Misoprostil at delivery. 3. Pain control  Patient desires to attempt natural child birth  Informed that she may opt to have an epidural at her request  Also counseled about Nitrous Oxide    The patient agrees with the plan of care and all of her questions were answered.

## 2019-10-18 NOTE — PROGRESS NOTES
Post-Partum Day Number 1 Progress Note    Jon Ash     Assessment: Doing well, post partum day 1 s/p  @ 36 4/7 wks for [facility] with AMANDA    Plan:  1. Continue routine postpartum and perineal care as well as maternal education. 2. Magnesium until 1100 today; pt is reporting significantly improved HA primarily with Ibuprofen, now absence of visual changes and has no RUQ/epigastric pain; she is diuresing and overall appears to be doing well  3. Continue meds for CHTN; labetalol and procardia XL    Information for the patient's :  Pricilla Batista [[id number]]   Vaginal, Spontaneous   Patient doing well without significant complaint. Voiding without difficulty, normal lochia. She has been very pleased with her hospital experience. Her only complaint is of the ayala catheter. She is breastfeeding her  daughter, Marianna Raman. Vitals:  Visit Vitals  /90   Pulse 73   Temp 98.4 °F (36.9 °C)   Resp 16   Ht 5' 6\" (1.676 m)   Wt 100.2 kg (221 lb)   LMP 2019 (Exact Date)   SpO2 99%   Breastfeeding? Unknown   BMI 35.67 kg/m²     Temp (24hrs), Av.4 °F (36.9 °C), Min:98.1 °F (36.7 °C), Max:98.7 °F (37.1 °C)        Exam:   Patient without distress. Abdomen soft, fundus firm, nontender                Lower extremities are symmetric and non tender.     Labs:     Lab Results   Component Value Date/Time    WBC 9.0 10/17/2019 03:17 PM    WBC 6.3 10/16/2019 08:43 PM    WBC 6.5 10/15/2019 11:54 PM    WBC 9.1 2019 03:50 PM    WBC 5.4 2018 08:31 AM    HGB 10.5 (L) 10/17/2019 03:17 PM    HGB 9.6 (L) 10/16/2019 08:43 PM    HGB 10.7 (L) 10/15/2019 11:54 PM    HGB 12.1 2019 03:50 PM    HGB 12.5 2018 08:31 AM    HCT 31.9 (L) 10/17/2019 03:17 PM    HCT 29.4 (L) 10/16/2019 08:43 PM    HCT 33.0 (L) 10/15/2019 11:54 PM    HCT 36.1 2019 03:50 PM    HCT 38.2 2018 08:31 AM    PLATELET 904  03:17 PM    PLATELET 350  08:43 PM    PLATELET 603 10/15/2019 11:54 PM    PLATELET 167 38/23/1641 03:50 PM    PLATELET 103 62/41/6095 08:31 AM       Recent Results (from the past 24 hour(s))   CBC W/O DIFF    Collection Time: 10/17/19  3:17 PM   Result Value Ref Range    WBC 9.0 3.6 - 11.0 K/uL    RBC 3.62 (L) 3.80 - 5.20 M/uL    HGB 10.5 (L) 11.5 - 16.0 g/dL    HCT 31.9 (L) 35.0 - 47.0 %    MCV 88.1 80.0 - 99.0 FL    MCH 29.0 26.0 - 34.0 PG    MCHC 32.9 30.0 - 36.5 g/dL    RDW 13.4 11.5 - 14.5 %    PLATELET 739 111 - 508 K/uL    MPV 11.4 8.9 - 12.9 FL    NRBC 0.0 0  WBC    ABSOLUTE NRBC 0.00 0.00 - 0.01 K/uL

## 2019-10-18 NOTE — ANESTHESIA PROCEDURE NOTES
Epidural Block    Start time: 10/17/2019 9:55 PM  End time: 10/17/2019 10:08 PM  Performed by: Saleem Casey MD  Authorized by: Saleem Casey MD     Pre-Procedure  Indication: labor epidural    Preanesthetic Checklist: patient identified, risks and benefits discussed, anesthesia consent, patient being monitored, timeout performed and anesthesia consent    Timeout Time: 21:55        Epidural:   Patient position:  Seated  Prep region:  Lumbar  Prep: Betadine    Location:  L3-4    Needle and Epidural Catheter:   Needle Type:  Tuohy  Needle Gauge:  17 G  Injection Technique:  Loss of resistance using air  Attempts:  1  Catheter Size:  20 G  Catheter at Skin Depth (cm):  11  Depth in Epidural Space (cm):  5  Events: no blood with aspiration, no cerebrospinal fluid with aspiration and negative aspiration test    Test Dose:  Lidocaine 1.5% w/ epi and negative    Assessment:   Catheter Secured:  Tape  Insertion:  Uncomplicated  Patient tolerance:  Patient tolerated the procedure well with no immediate complications

## 2019-10-18 NOTE — PROGRESS NOTES
1930 VS and assessment completed, plan of care discussed with patient who verbalizes understanding and agreement. 2000 Dr Tena Zavala at the bedside. 2006 AROM, scant clear fluid noted. 2150 Dr Estella Perez at the bedside for epidural placement. 2300Nancy Hernandez MD at bedside. SVE by MD 8-9/100/0.     7165 Arango Catheter placed by Dr Cheyanne Posey sitting up in bed talking with family and eating. 0130 Total     0245 Pt assisted too and from the bathroom. Pt had a bowel movement w/o difficulty. Bed linen changed and egg crate added to bed by ARMEN Barron,RN    0710 Bedside and Verbal shift change report given to M. Cooks,RN by Joe Chavez . Report included the following information SBAR, Procedure Summary and Intake/Output.

## 2019-10-18 NOTE — ROUTINE PROCESS
Bedside and Verbal shift change report given to eri mejia rn (oncoming nurse) by ida deleon rn (offgoing nurse). Report included the following information SBAR, Kardex, Procedure Summary, Intake/Output, MAR, Accordion and Recent Results.

## 2019-10-19 PROCEDURE — 74011250637 HC RX REV CODE- 250/637: Performed by: OBSTETRICS & GYNECOLOGY

## 2019-10-19 PROCEDURE — 65270000029 HC RM PRIVATE

## 2019-10-19 RX ORDER — FUROSEMIDE 10 MG/ML
20 INJECTION INTRAMUSCULAR; INTRAVENOUS ONCE
Status: DISCONTINUED | OUTPATIENT
Start: 2019-10-19 | End: 2019-10-19

## 2019-10-19 RX ORDER — FUROSEMIDE 20 MG/1
20 TABLET ORAL ONCE
Status: COMPLETED | OUTPATIENT
Start: 2019-10-19 | End: 2019-10-19

## 2019-10-19 RX ADMIN — LABETALOL HYDROCHLORIDE 400 MG: 200 TABLET, FILM COATED ORAL at 08:01

## 2019-10-19 RX ADMIN — Medication 1 TABLET: at 08:02

## 2019-10-19 RX ADMIN — NIFEDIPINE 30 MG: 30 TABLET, FILM COATED, EXTENDED RELEASE ORAL at 08:01

## 2019-10-19 RX ADMIN — IBUPROFEN 800 MG: 800 TABLET ORAL at 16:36

## 2019-10-19 RX ADMIN — FUROSEMIDE 20 MG: 20 TABLET ORAL at 10:13

## 2019-10-19 RX ADMIN — LABETALOL HYDROCHLORIDE 400 MG: 200 TABLET, FILM COATED ORAL at 21:06

## 2019-10-19 RX ADMIN — IBUPROFEN 800 MG: 800 TABLET ORAL at 08:02

## 2019-10-19 RX ADMIN — ANTACID TABLETS 200 MG: 500 TABLET, CHEWABLE ORAL at 16:37

## 2019-10-19 RX ADMIN — FAMOTIDINE 20 MG: 20 TABLET ORAL at 08:01

## 2019-10-19 RX ADMIN — SERTRALINE HYDROCHLORIDE 50 MG: 50 TABLET ORAL at 08:01

## 2019-10-19 RX ADMIN — IBUPROFEN 800 MG: 800 TABLET ORAL at 00:03

## 2019-10-19 NOTE — ROUTINE PROCESS
Bedside and Verbal shift change report given to drea lemons rn (oncoming nurse) by barbara deleon rn (offgoing nurse). Report included the following information SBAR, Kardex, OR Summary, Procedure Summary, Intake/Output, MAR, Accordion and Recent Results.

## 2019-10-19 NOTE — PROGRESS NOTES
Bedside and Verbal shift change report given to ASHLEIGH Gaspar, RN, RN (oncoming nurse) by Jackelin Coles. Brenda Joshi (offgoing nurse). Report included the following information SBAR, Kardex, Intake/Output, MAR and Recent Results.

## 2019-10-19 NOTE — PROGRESS NOTES
Post-Partum Day Number 2 Progress Note    Belinda Neri     Assessment: Doing well, post partum day 2   @ 36.4 for CHTN with SIPreE w/ severe features (headache) s/p 12h mag postpartum  Labile Bps, severe this am    Plan:   1. Sev BP this morning 164/88, previously had been 123-151/60-90s postpartum  Suspect due to fluid mobilization from [facility]. Morning doses of nifedipine 30XR and labetalol 400mg administered early-->repeat /70  20mg IV lasix given to facilitate fluid mobilization  Continue medications for CHTN: Labetalol 400mg BID and nifedipine 30mg XR  Discussed HCTZ and its effect on lactation, can consider transition later in postpartum period but will hold now  Given BP lability, will monitor with q4h Bps for next 24 hours to evaluate for medication adjustment  2. Continue routine postpartum care  4. Discharge Medications: rx ibuprofen and stool softeners to be written    BP mood check in x 1 week; 6week postpartum visit. Information for the patient's :  Luisito Syracuse [[id number]]   Vaginal, Spontaneous       Patient doing well without significant complaint. Voiding without difficulty, normal lochia. Denies headaches, vision changes, RUQ pain. Minimal pain. Vitals:  Visit Vitals  /75 (BP 1 Location: Right arm, BP Patient Position: Post activity)   Pulse 79   Temp 97.8 °F (36.6 °C)   Resp 16   Ht 5' 6\" (1.676 m)   Wt 100.2 kg (221 lb)   LMP 2019 (Exact Date)   SpO2 98%   Breastfeeding? Unknown   BMI 35.67 kg/m²     Temp (24hrs), Av.1 °F (36.7 °C), Min:97.8 °F (36.6 °C), Max:98.4 °F (36.9 °C)      Exam:         Patient without distress.       LCTAB      RRR                 Abdomen soft, fundus firm, nontender                 Lower extremities 2+ swelling    Labs:     Lab Results   Component Value Date/Time    WBC 9.0 10/17/2019 03:17 PM    WBC 6.3 10/16/2019 08:43 PM    WBC 6.5 10/15/2019 11:54 PM    WBC 9.1 2019 03:50 PM    WBC 5.4 2018 08:31 AM HGB 10.5 (L) 10/17/2019 03:17 PM    HGB 9.6 (L) 10/16/2019 08:43 PM    HGB 10.7 (L) 10/15/2019 11:54 PM    HGB 12.1 04/19/2019 03:50 PM    HGB 12.5 09/18/2018 08:31 AM    HCT 31.9 (L) 10/17/2019 03:17 PM    HCT 29.4 (L) 10/16/2019 08:43 PM    HCT 33.0 (L) 10/15/2019 11:54 PM    HCT 36.1 04/19/2019 03:50 PM    HCT 38.2 09/18/2018 08:31 AM    PLATELET 631 19/06/3204 03:17 PM    PLATELET 328 11/48/0893 08:43 PM    PLATELET 387 59/73/6584 11:54 PM    PLATELET 090 96/48/4136 03:50 PM    PLATELET 964 04/81/9967 08:31 AM       No results found for this or any previous visit (from the past 24 hour(s)).

## 2019-10-19 NOTE — LACTATION NOTE
This note was copied from a baby's chart. Mother is breast and formula feeding her baby. Mother states she started formula last night because baby was fussy and would not latch on. Instructed mother to offer baby breast first so that baby gets mother's immunity. Discussed the option of pumping to help stimulate milk supply. Instructed mother to call [facility] when baby breastfeeds again. Reviewed breastfeeding basics:  Supply and demand, Breastfeed baby 8-12 times in 24 hr., feed baby on demand,  inewborn stomach size, early  Feeding cues, skin to skin, positioning and baby led latch-on, assymetrical latch with signs of good, deep latch vs shallow, feeding frequency and duration, and log sheet for tracking infant feedings and output. Breastfeeding Booklet and Warm line information given. Discussed typical  weight loss and the importance of infant weight checks with pediatrician 1-2 post discharge. Discussed pumping/storage and preparation of expressed breast milk for baby. Mother has a Spectra pump for home use. Engorgement Care Guidelines:  Reviewed how milk is made and normal phases of milk production. Taught care of engorged breasts - frequent breastfeeding encouraged, cool packs and motrin as tolerated. Anticipatory guidance shared. Care for sore/tender nipples discussed:  ways to improve positioning and latch practiced and discussed, hand express colostrum after feedings and let air dry, light application of lanolin, hydrogel pads, seek comfortable laid back feeding position, start feedings on least sore side first.    Mother chooses to do both breast and bottle. Discussed effects of early supplementation on breastfeeding success; may decrease breastmilk production and supply, increase risk for pathological engorgement, baby may develop preference for faster flow from bottles vs breast, and baby's stomach can be stretched if larger volumes of formula are given.     Discussed eating a healthy diet. Instructed mother to eat a variety of foods in order to get a well balanced diet. She should consume an extra 500 calories per day (more than her non-pregnant requirement.) These extra calories will help provide energy needed for optimal breast milk production. Mother also encouraged to \"drink to thirst\" and it is recommended that she drink fluids such as water, fruit/vegetable juice. Nutritious snacks should be available so that she can eat throughout the day to help satisfy her hunger and maintain a good milk supply. Reviewed risks associated with introducing an artificial nipple or pacifier and encouraged mother to wait  until breastfeeding is well established ( AAP guidelines suggest waiting until one month of age). Discussed that using artificial nipples may cause ineffective sucking at breast  in the , decreased breast stimulation/lowered breast milk production and  breastfeeding difficulties. Pt will successfully establish breastfeeding by feeding in response to early feeding cues   or wake every 3h, will obtain deep latch, and will keep log of feedings/output. Taught to BF at hunger cues and or q 2-3 hrs and to offer 10-20 drops of hand expressed colostrum at any non-feeds. Breast Assessment  Left Breast: Large  Left Nipple: Everted, Intact  Right Breast: Large  Right Nipple: Everted, Intact  Breast- Feeding Assessment  Attends Breast-Feeding Classes: No  Breast-Feeding Experience: Yes  Breast Trauma/Surgery: No  Type/Quality: Good(Mother states she started supplementing last night because baby did not seem satisfied and was fussy.)  Lactation Consultant Visits  Breast-Feedings: (Mother last breast fed baby at 0700 for 15 min. She then gave formula at 0845. Instructed mom to call [facility] when baby breastfeeds again. )    Mother has LC#/support group info.

## 2019-10-20 VITALS
DIASTOLIC BLOOD PRESSURE: 94 MMHG | HEIGHT: 66 IN | WEIGHT: 221 LBS | BODY MASS INDEX: 35.52 KG/M2 | SYSTOLIC BLOOD PRESSURE: 153 MMHG | HEART RATE: 67 BPM | TEMPERATURE: 98.4 F | RESPIRATION RATE: 16 BRPM | OXYGEN SATURATION: 98 %

## 2019-10-20 PROCEDURE — 74011250637 HC RX REV CODE- 250/637: Performed by: OBSTETRICS & GYNECOLOGY

## 2019-10-20 RX ORDER — NIFEDIPINE 30 MG/1
30 TABLET, EXTENDED RELEASE ORAL DAILY
Qty: 30 TAB | Refills: 0 | Status: SHIPPED | OUTPATIENT
Start: 2019-10-20 | End: 2020-09-01

## 2019-10-20 RX ORDER — LABETALOL 100 MG/1
400 TABLET, FILM COATED ORAL 2 TIMES DAILY
Qty: 60 TAB | Refills: 0 | Status: SHIPPED | OUTPATIENT
Start: 2019-10-20 | End: 2020-11-30

## 2019-10-20 RX ORDER — SERTRALINE HYDROCHLORIDE 50 MG/1
TABLET, FILM COATED ORAL
Qty: 90 TAB | Refills: 3 | Status: SHIPPED | OUTPATIENT
Start: 2019-10-20 | End: 2020-09-01

## 2019-10-20 RX ORDER — IBUPROFEN 800 MG/1
800 TABLET ORAL
Qty: 30 TAB | Refills: 0 | Status: SHIPPED | OUTPATIENT
Start: 2019-10-20 | End: 2020-09-01

## 2019-10-20 RX ORDER — DOCUSATE SODIUM 100 MG/1
100 CAPSULE, LIQUID FILLED ORAL
Qty: 30 CAP | Refills: 0 | Status: SHIPPED | OUTPATIENT
Start: 2019-10-20 | End: 2020-11-30 | Stop reason: ALTCHOICE

## 2019-10-20 RX ADMIN — IBUPROFEN 800 MG: 800 TABLET ORAL at 08:49

## 2019-10-20 RX ADMIN — LABETALOL HYDROCHLORIDE 400 MG: 200 TABLET, FILM COATED ORAL at 08:25

## 2019-10-20 RX ADMIN — HYDROCODONE BITARTRATE AND ACETAMINOPHEN 1 TABLET: 5; 325 TABLET ORAL at 11:19

## 2019-10-20 RX ADMIN — FAMOTIDINE 20 MG: 20 TABLET ORAL at 08:26

## 2019-10-20 RX ADMIN — Medication 1 TABLET: at 08:25

## 2019-10-20 RX ADMIN — ANTACID TABLETS 200 MG: 500 TABLET, CHEWABLE ORAL at 08:25

## 2019-10-20 RX ADMIN — IBUPROFEN 800 MG: 800 TABLET ORAL at 00:25

## 2019-10-20 RX ADMIN — NIFEDIPINE 30 MG: 30 TABLET, FILM COATED, EXTENDED RELEASE ORAL at 08:26

## 2019-10-20 RX ADMIN — SERTRALINE HYDROCHLORIDE 50 MG: 50 TABLET ORAL at 08:49

## 2019-10-20 NOTE — DISCHARGE SUMMARY
Obstetrical Discharge Summary     Name: Chivo Conway MRN: [id number]  SSN: [id number]    YOB: 1990  Age: 34 y.o. Sex: female      Admit Date: 10/16/2019    Discharge Date: 10/20/2019     Admitting Physician: Judith Shannon MD     Attending Physician:  Radha Mcnaamra MD     Admission Diagnoses: Pregnant [Z34.90]  Term pregnancy [Z34.90]    Discharge Diagnoses:   Information for the patient's :  Milo Waldrop [[id number]]   Delivery of a 3.125 kg female infant via Vaginal, Spontaneous on 10/17/2019 at 11:20 PM  by Massiel Moreau. Apgars were 8  and 9 . Additional Diagnoses:   Hospital Problems  Date Reviewed: 2018          Codes Class Noted POA    Term pregnancy ICD-10-CM: Z34.90  ICD-9-CM: V22.1  10/17/2019 Unknown        Pregnant ICD-10-CM: Z34.90  ICD-9-CM: V22.2  10/16/2019 Unknown             Lab Results   Component Value Date/Time    Rubella, External Immune 2019    GrBStrep, External negative 10/10/2019       Hospital Course: 32yo [id number] admitted at 36.3 with SI Pre E for evaluation of headache. She was given fioricet without improvement in symptoms therefore induction was started after course of BMZ. She had an uncomplicated PTSVD and was given magnesium for 12h postpartum. Her home blood pressure medications were continued. PPD2 she was meeting all postpartum milestones but had a severe range BP. She was given PO lasix 20mg and diuresed appropriately. She was started on zoloft for her mood disorder. Her bloodpressures were then normal - mild range. She was asymptomatic. By PPD3 she was doing very well and was discharged home with ibuprofen and stool softener. She is to follow up within 1 week for a mood and blood pressure check. She was given specific instructions to have BP log and to call if having any severe range blood pressures or PIH symptoms.      Disposition at Discharge: Home or self care    Discharged Condition: Stable    Patient Instructions:   Current Discharge Medication List      START taking these medications    Details   docusate sodium (COLACE) 100 mg capsule Take 1 Cap by mouth nightly as needed for Constipation for up to 30 doses. Qty: 30 Cap, Refills: 0      ibuprofen (MOTRIN) 800 mg tablet Take 1 Tab by mouth every eight (8) hours as needed for Pain. Qty: 30 Tab, Refills: 0         CONTINUE these medications which have NOT CHANGED    Details   NIFEdipine ER (PROCARDIA XL) 30 mg ER tablet Take  by mouth daily. labetalol (NORMODYNE) 100 mg tablet Take 400 mg by mouth two (2) times a day. sertraline (ZOLOFT) 50 mg tablet TAKE 1 TABLET BY MOUTH DAILY. Qty: 90 Tab, Refills: 3         STOP taking these medications       ondansetron hcl (ZOFRAN) 4 mg tablet Comments:   Reason for Stopping:         [id number] angie-folic-dha (PRENATAL DHA+COMPLETE PRENATAL) -300 mg-mcg-mg cmpk Comments:   Reason for Stopping:               Reference my discharge instructions. Follow-up Appointments   Procedures    FOLLOW UP VISIT Appointment in: One Week To be seen within 1 week of discharge for mood and BP check. Then to be ween 6 weeks for routine postpartum visit. To be seen within 1 week of discharge for mood and BP check. Then to be ween 6 weeks for routine postpartum visit. Standing Status:   Standing     Number of Occurrences:   1     Order Specific Question:   Appointment in     Answer:    One Week        Signed By:  Pop Villarreal MD     October 20, 2019

## 2019-10-20 NOTE — ROUTINE PROCESS
Bedside and Verbal shift change report given to SERA Gaspar RN (oncoming nurse) by Manisha Flores RN (offgoing nurse). Report included the following information SBAR, Kardex, MAR and Accordion.

## 2019-10-20 NOTE — PROGRESS NOTES
Post-Partum Day Number 2 Progress Note    Adrienne Meraz     Assessment: Doing well, post partum day 2   @ 36.4 for CHTN with SIPreE w/ severe features (headache) s/p 12h mag postpartum      Plan:   1. Discharge home today  2. Continue nifedipine 30XR and labetalol 400mg, S/p diuresis with 20mg PO lazix. Given strict PIH precautions and will do BP log. Will increase nifedipine to 60mg XR if persistently elevated Bps, however this morning was 884J systolic- therefore will keep 30mg. Discussed HCTZ and its effect on lactation, can consider transition later in postpartum period but will hold now  3. Postopartum care discussed  4. Discharge Medications: rx ibuprofen and stool softeners    BP mood check within 1 week; 6week postpartum visit. Information for the patient's :  Charlotte Bateman [[id number]]   Vaginal, Spontaneous       Patient doing well without significant complaint. Voiding without difficulty, normal lochia. Denies headaches, vision changes, RUQ pain. Urination increased overnight. Vitals:  Visit Vitals  /70 (BP 1 Location: Left arm, BP Patient Position: Sitting)   Pulse 79   Temp 98.1 °F (36.7 °C)   Resp 16   Ht 5' 6\" (1.676 m)   Wt 100.2 kg (221 lb)   LMP 2019 (Exact Date)   SpO2 98%   Breastfeeding? Unknown   BMI 35.67 kg/m²     Temp (24hrs), Av.3 °F (36.8 °C), Min:98.1 °F (36.7 °C), Max:98.6 °F (37 °C)      Exam:         Patient without distress.       LCTAB      RRR                 Abdomen soft, fundus firm, nontender                 Lower extremities 1+ swelling    Labs:     Lab Results   Component Value Date/Time    WBC 9.0 10/17/2019 03:17 PM    WBC 6.3 10/16/2019 08:43 PM    WBC 6.5 10/15/2019 11:54 PM    WBC 9.1 2019 03:50 PM    WBC 5.4 2018 08:31 AM    HGB 10.5 (L) 10/17/2019 03:17 PM    HGB 9.6 (L) 10/16/2019 08:43 PM    HGB 10.7 (L) 10/15/2019 11:54 PM    HGB 12.1 2019 03:50 PM    HGB 12.5 2018 08:31 AM    HCT 31.9 (L) 10/17/2019 03:17 PM    HCT 29.4 (L) 10/16/2019 08:43 PM    HCT 33.0 (L) 10/15/2019 11:54 PM    HCT 36.1 04/19/2019 03:50 PM    HCT 38.2 09/18/2018 08:31 AM    PLATELET 758 18/11/9241 03:17 PM    PLATELET 968 15/28/5816 08:43 PM    PLATELET 232 44/23/5124 11:54 PM    PLATELET 476 07/69/7635 03:50 PM    PLATELET 748 06/90/0926 08:31 AM       No results found for this or any previous visit (from the past 24 hour(s)).

## 2019-10-20 NOTE — ROUTINE PROCESS
Patient off unit in stable condition via wheelchair with volunteers for discharge home per  MD.  Patient is to follow up in 1 week for blood pressure check and EPDS follow up and again in 6 weeks and is aware. Prescriptions given to patient. Patient denies H/A, dizziness, nausea and or vomiting or pain at this time. Infant in car seat with mom.

## 2020-05-29 ENCOUNTER — TELEPHONE (OUTPATIENT)
Dept: FAMILY MEDICINE CLINIC | Age: 30
End: 2020-05-29

## 2020-05-29 NOTE — TELEPHONE ENCOUNTER
Patient is having sinus pressure especially behind her eyes. She didn't want a virtual appointment. Patient is requesting an antibiotic. Pharmacy: [facility].  Patient's phone: [phone]

## 2020-09-01 ENCOUNTER — VIRTUAL VISIT (OUTPATIENT)
Dept: FAMILY MEDICINE CLINIC | Age: 30
End: 2020-09-01
Payer: COMMERCIAL

## 2020-09-01 DIAGNOSIS — I10 ESSENTIAL HYPERTENSION: ICD-10-CM

## 2020-09-01 DIAGNOSIS — R51.9 CHRONIC NONINTRACTABLE HEADACHE, UNSPECIFIED HEADACHE TYPE: Primary | ICD-10-CM

## 2020-09-01 DIAGNOSIS — G89.29 CHRONIC NONINTRACTABLE HEADACHE, UNSPECIFIED HEADACHE TYPE: Primary | ICD-10-CM

## 2020-09-01 DIAGNOSIS — F31.9 BIPOLAR AFFECTIVE DISORDER, REMISSION STATUS UNSPECIFIED (HCC): ICD-10-CM

## 2020-09-01 PROCEDURE — 99214 OFFICE O/P EST MOD 30 MIN: CPT | Performed by: FAMILY MEDICINE

## 2020-09-01 RX ORDER — NIFEDIPINE 60 MG/1
60 TABLET, EXTENDED RELEASE ORAL DAILY
Qty: 30 TAB | Refills: 2 | Status: SHIPPED | OUTPATIENT
Start: 2020-09-01 | End: 2022-05-17

## 2020-09-01 NOTE — PROGRESS NOTES
Patient here today with complaints of a persistent headache off and on over the last several months. She describes it as a bandlike distribution across her forehead occasionally involving her left nostril and left eye. She is taken Imitrex in the past without benefit and had a adverse reaction. She takes Motrin as needed without benefit but is not taking it daily. Patient is no longer taking Zoloft. She is breast-feeding. Patient's blood pressure has been elevated in the systolic range 1 44-4 60 at home. She is taking her nifedipine as well as labetalol. Consent: Yuan Bowie, who was seen by synchronous (real-time) audio-video technology, and/or her healthcare decision maker, is aware that this patient-initiated, Telehealth encounter on 9/1/2020 is a billable service, with coverage as determined by her insurance carrier. She is aware that she may receive a bill and has provided verbal consent to proceed: YES-Consent obtained within past 12 months        712  Subjective:   Yuan Bowie is a 27 y.o. female who was seen for No chief complaint on file. Prior to Admission medications    Medication Sig Start Date End Date Taking? Authorizing Provider   NIFEdipine ER (PROCARDIA XL) 60 mg ER tablet Take 1 Tab by mouth daily. 9/1/20  Yes Edna Ocnonell MD   docusate sodium (COLACE) 100 mg capsule Take 1 Cap by mouth nightly as needed for Constipation for up to 30 doses. 10/20/19   Sandra Zhu MD   labetalol (NORMODYNE) 100 mg tablet Take 4 Tabs by mouth two (2) times a day. 10/20/19   Sandra Zhu MD   ibuprofen (MOTRIN) 800 mg tablet Take 1 Tab by mouth every eight (8) hours as needed for Pain. 10/20/19 9/1/20  Sandra Zhu MD   sertraline (ZOLOFT) 50 mg tablet TAKE 1 TABLET BY MOUTH DAILY. 10/20/19 9/1/20  Sandra Zhu MD   NIFEdipine ER (PROCARDIA XL) 30 mg ER tablet Take 1 Tab by mouth daily.  10/20/19 9/1/20  Sandra Zhu MD     Allergies   Allergen Reactions    Imitrex [Sumatriptan] Nausea Only and Other (comments)     Blurred vision & hallucinations       ROS    Objective:   Vital Signs: (As obtained by patient/caregiver at home)  There were no vitals taken for this visit. [INSTRUCTIONS:  \"[x]\" Indicates a positive item  \"[]\" Indicates a negative item  -- DELETE ALL ITEMS NOT EXAMINED]    Constitutional: [x] Appears well-developed and well-nourished [x] No apparent distress      [] Abnormal -     Mental status: [x] Alert and awake  [x] Oriented to person/place/time [x] Able to follow commands    [] Abnormal -     Eyes:   EOM    [x]  Normal    [] Abnormal -   Sclera  [x]  Normal    [] Abnormal -          Discharge [x]  None visible   [] Abnormal -     HENT: [x] Normocephalic, atraumatic  [] Abnormal -   [x] Mouth/Throat: Mucous membranes are moist    External Ears [x] Normal  [] Abnormal -    Neck: [x] No visualized mass [] Abnormal -     Pulmonary/Chest: [x] Respiratory effort normal   [x] No visualized signs of difficulty breathing or respiratory distress        [] Abnormal -        Neurological:        [x] No Facial Asymmetry (Cranial nerve 7 motor function) (limited exam due to video visit)          [x] No gaze palsy        [] Abnormal -          Skin:        [x] No significant exanthematous lesions or discoloration noted on facial skin         [] Abnormal -            Psychiatric:       [x] Normal Affect [] Abnormal -        [x] No Hallucinations    Other pertinent observable physical exam findings:-              Assessment & Plan:   Diagnoses and all orders for this visit:    1. Chronic nonintractable headache, unspecified headache type  -     REFERRAL TO NEUROLOGY  -Patient is breast-feeding, will avoid medication at this point but have asked patient to stop the ibuprofen to eliminate the possibility of rebound headaches. Due to the chronic nature of these headaches will refer patient to neurology for further evaluation.     2. Essential hypertension  -Blood pressure is chronically elevated, this could be a source of the headaches. Will increase nifedipine from 30 to 60 mg once a day and see if this helps. 3. Bipolar affective disorder, remission status unspecified ([facility]  -Patient currently not on medication and not having any symptoms. Other orders  -     NIFEdipine ER (PROCARDIA XL) 60 mg ER tablet; Take 1 Tab by mouth daily. We discussed the expected course, resolution and complications of the diagnosis(es) in detail. Medication risks, benefits, costs, interactions, and alternatives were discussed as indicated. I advised her to contact the office if her condition worsens, changes or fails to improve as anticipated. She expressed understanding with the diagnosis(es) and plan. Evans Schaeffer is a 27 y.o. female being evaluated by a video visit encounter for concerns as above. A caregiver was present when appropriate. Due to this being a TeleHealth encounter (During [facility]-09 public health emergency), evaluation of the following organ systems was limited: Vitals/Constitutional/EENT/Resp/CV/GI//MS/Neuro/Skin/Heme-Lymph-Imm. Pursuant to the emergency declaration under the [facility], 1135 waiver authority and the [employer] and [employer] Act, this Virtual  Visit was conducted, with patient's (and/or legal guardian's) consent, to reduce the patient's risk of exposure to COVID-19 and provide necessary medical care. Services were provided through a video synchronous discussion virtually to substitute for in-person clinic visit. Patient and provider were located at their individual homes.         Raad Hannah MD

## 2020-10-01 ENCOUNTER — OFFICE VISIT (OUTPATIENT)
Dept: NEUROLOGY | Age: 30
End: 2020-10-01
Payer: COMMERCIAL

## 2020-10-01 VITALS
SYSTOLIC BLOOD PRESSURE: 160 MMHG | RESPIRATION RATE: 20 BRPM | WEIGHT: 194 LBS | HEART RATE: 89 BPM | HEIGHT: 66 IN | DIASTOLIC BLOOD PRESSURE: 108 MMHG | OXYGEN SATURATION: 96 % | TEMPERATURE: 96.3 F | BODY MASS INDEX: 31.18 KG/M2

## 2020-10-01 DIAGNOSIS — I10 ESSENTIAL HYPERTENSION: ICD-10-CM

## 2020-10-01 DIAGNOSIS — G43.111 INTRACTABLE MIGRAINE WITH AURA WITH STATUS MIGRAINOSUS: Primary | ICD-10-CM

## 2020-10-01 PROCEDURE — 99244 OFF/OP CNSLTJ NEW/EST MOD 40: CPT | Performed by: PSYCHIATRY & NEUROLOGY

## 2020-10-01 RX ORDER — RIMEGEPANT SULFATE 75 MG/75MG
TABLET, ORALLY DISINTEGRATING ORAL
Qty: 8 TAB | Refills: 4 | Status: SHIPPED | OUTPATIENT
Start: 2020-10-01 | End: 2020-11-20

## 2020-10-01 RX ORDER — FREMANEZUMAB-VFRM 225 MG/1.5ML
225 INJECTION SUBCUTANEOUS
Qty: 225 MG | Refills: 3 | Status: SHIPPED | OUTPATIENT
Start: 2020-10-01 | End: 2021-01-04

## 2020-10-01 RX ORDER — BUTALBITAL, ACETAMINOPHEN AND CAFFEINE 50; 325; 40 MG/1; MG/1; MG/1
TABLET ORAL
COMMUNITY
Start: 2020-09-01 | End: 2021-01-04

## 2020-10-01 RX ORDER — FREMANEZUMAB-VFRM 225 MG/1.5ML
225 INJECTION SUBCUTANEOUS ONCE
Qty: 1.5 ML | Refills: 0 | Status: SHIPPED | COMMUNITY
Start: 2020-10-01 | End: 2020-10-01

## 2020-10-01 NOTE — PROGRESS NOTES
Chief Complaint   Patient presents with    New Patient    Migraine     Had migraines previously, tried imitrex but had \"allergic rxn\" (dizzy, nausea, lethargy)did not have for a while, then returned about 1 yr ago. Pt thought is was sinus HAs but they keep occurring 15/30 HA days feels like band going across head but one sided pulsing with occasional sharp stabs. They make it difficult to fxn. Lasts approx half of day. Has nausea and sometimes nose will burn    Taken Fioricet which does not work. Excedrin migraine and benadryl seems to work slightly better.     CURRENTLY NURSING

## 2020-10-01 NOTE — PROGRESS NOTES
[facility] and [address] at [facility] at [address] [place] [address] [address] [place] [address], [address] [facility] [address] [facility], [address] [place] [address]  [phone] Office  [phone] Facsimile           Referring: Dr. Ramonita Holman      Chief Complaint   Patient presents with    New Patient    Migraine   27-year-old right-handed woman who presents today for neurologic consultation regarding migraine. She tells me she has had migraine headaches for a number of years. She was previously seen down at [facility] and given Imitrex. That gave her what she perceived as an allergic reaction where she felt dizzy and nauseated. Her headaches seem to dissipate. She then had recurrence of her headaches about a year and a half ago. No inciting factor. She thought perhaps they were sinus headaches. She saw an allergist who told her she did not have allergies. She has been using over-the-counter Excedrin. She is gone to the emergency department on several occasions for the headache cocktail. Headaches are her typical that she has had over the last several years. While she was pregnant, having delivered about 11 months ago, she had improvement in her headaches but they have returned. Typically headaches are located either left or right sided described as throbbing. She has associated photophobia phonophobia and nausea. Typically does not vomit. Sleep is sometimes curative but not always. Pain worsens with movement. Her grandmother had headaches. She notes an average month we will give her 15 distinct headaches with an average duration of hours-2 days so her total number of headache days per month are 24-30. She tries not to take medication daily. There are some headaches that vary in intensity and she can work through. She has called out of work because of these. She has missed other activities.   She has an aura of burning in her nose prior to the onset of 1. No visual disturbance. No numbness or tingling. No focal weakness. No loss or alteration in consciousness with a headache. No focal neurologic deficit with a headache. Her blood pressures been running on the high side and her medication is just been adjusted. She did have a early delivery of her daughter secondary to elevated blood pressure. Dr. Michelle Hackett just increased her Procardia. She has not noted much of a change. No chest pain. No palpitations. No shortness of breath. No dizziness vertigo visual disturbance. Office note from Dr. Michelle Hackett dated September 1, 2020 where she was seen for complaint of recurring headaches finds that she was running elevated blood pressures. She was breast-feeding. She was no longer on Zoloft. Her Procardia was increased from 30 mg to 60 mg. She was referred here for evaluation due to the ongoing headache. Past Medical History:   Diagnosis Date    Asthma     Cold and exercise induced    Bipolar 1 disorder ([facility]     Gestational diabetes     diet controlled    Hypertension     Migraine     Postpartum depression      History reviewed. No pertinent surgical history. Current Outpatient Medications   Medication Sig Dispense Refill    NIFEdipine ER (PROCARDIA XL) 60 mg ER tablet Take 1 Tab by mouth daily. 30 Tab 2    labetalol (NORMODYNE) 100 mg tablet Take 4 Tabs by mouth two (2) times a day. 60 Tab 0    butalbital-acetaminophen-caffeine (FIORICET, ESGIC) -40 mg per tablet TAKE 1 TABLET BY MOUTH EVERY 6 HOURS AS NEEDED HEADACHE      docusate sodium (COLACE) 100 mg capsule Take 1 Cap by mouth nightly as needed for Constipation for up to 30 doses. 30 Cap 0        Allergies   Allergen Reactions    Sumatriptan Nausea Only, Other (comments) and Vertigo     Blurred vision & hallucinations       Social History     Tobacco Use    Smoking status: Never Smoker    Smokeless tobacco: Never Used   Substance Use Topics    Alcohol use:  Yes Alcohol/week: 5.0 standard drinks     Types: 1 Glasses of wine, 4 Shots of liquor per week     Comment:  wine daily, liquor on the weekends    Drug use: No       Family History   Problem Relation Age of Onset    Hypertension Mother    Danial Filter Other Mother         fibromyalgia    Diabetes Mother     Sleep Apnea Mother     Hypertension Father      Review of Systems  Pertinent positives and negatives as noted with remainder of comprehensive review negative    Examination  Visit Vitals  BP (!) 160/112 (BP 1 Location: Left arm, BP Patient Position: Sitting)   Pulse 89   Temp (!) 96.3 °F (35.7 °C)   Resp 20   Ht 5' 6\" (1.676 m)   Wt 88 kg (194 lb)   SpO2 96%   Breastfeeding Yes   BMI 31.31 kg/m²     Pleasant, well appearing. Dress and grooming are appropriate. No scleral icterus is present. Oropharynx is clear. Supple neck without bruit appreciated. Heart regular. Pulses are symmetrical.  No edema in the lower extremities. Neurologically, she is awake, alert, and oriented with normal speech and language. Her cognition is normal.    Intact cranial nerves 2-12. No nystagmus. Visual fields full to confrontation. Disk margins are flat bilaterally. She has normal bulk and tone. She has no abnormal movement. She has no pronation or drift. She generates full strength in the upper and lower extremities to direct confrontational testing. Reflexes are symmetrical in the upper and lower extremities bilaterally. No pathologic reflexes are elicited. .  Finger nose finger and rapid alternating movements are normal.  Steady gait. No sensory deficit to primary modalities. Impression/Plan  Migraine headaches and we discussed migraine and migraine pathophysiology specifically in the context of CGRP and the new CGRP inhibitors for both prevention and abortive therapy. After discussion start Ajdidiery in a customary fashion. Discussed expectation, potential side effects, potential benefits and alternatives.   Discussed that this is an injectable medication and will give her her first injection here in the office today to also train her how to use the auto device. For abortive therapy use Nurtec 1 at onset with maximum 1 tablet 24 hours and again discussed details as above. Track headaches on a calendar and bring that each appointment. Hypertension with elevated blood pressure in the office today. She does note she did not take her medicine this morning and was a bit rushed to get here. Nursing will recheck--see nursing flowsheet. If she continues to have elevated pressures, and she should be checking these at home, she should touch base with Dr. Megha Marroquin nurse for advice in terms of additional medication management. Her blood pressure could be contributing to these headaches I agree with I do not think this is completely blood pressure related but certainly better control of her pressure would lead I think to better headache control. Follow-up in 3 months    Violeta Tobar MD    This note was created using voice recognition software. Despite editing, there may be syntax errors. This note will not be viewable in [address]

## 2020-10-01 NOTE — LETTER
10/1/20 Patient: Allyn Perez YOB: 1990 Date of Visit: 10/1/2020 Jules Redding NP 
[address] [facility] [address] [address] VIA In Basket Dear Jules Redding NP, Thank you for referring Ms. Allyn Perez to [facility] for evaluation. My notes for this consultation are attached. If you have questions, please do not hesitate to call me. I look forward to following your patient along with you. Sincerely, Kelsie Zuluaga MD

## 2020-11-20 ENCOUNTER — TELEPHONE (OUTPATIENT)
Dept: FAMILY MEDICINE CLINIC | Age: 30
End: 2020-11-20

## 2020-11-20 ENCOUNTER — TELEPHONE (OUTPATIENT)
Dept: NEUROLOGY | Age: 30
End: 2020-11-20

## 2020-11-20 RX ORDER — METHYLPREDNISOLONE 4 MG/1
TABLET ORAL
Qty: 1 DOSE PACK | Refills: 0 | Status: SHIPPED | OUTPATIENT
Start: 2020-11-20 | End: 2020-11-30 | Stop reason: ALTCHOICE

## 2020-11-20 NOTE — TELEPHONE ENCOUNTER
Returned call to pt and ID x 3. Pt states that she has broken out in hives twice in the past week. She has not done anything different, used anything different, or eaten anything different. She mentioned that she did receive her physician administered Ajovy injection this past week but she has received it before and has not had any problems. Pt been taking benadryl and using hydrocortisone cream. 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 I advised the pt that she should contact Dr. Bai Finger office and advise them of her reaction, since she could be displaying allergic reaction symptoms even if she was previously fine on the Ajovy. Pt verbalized understanding.

## 2020-11-20 NOTE — TELEPHONE ENCOUNTER
SENT A MEDROL PACK to her [facility]   Take as directed on there. This can speed up the symptoms and get rid of them. Also sent ubrelvy 100 mg tablets. 1 at HA onset and repeat in 2 hours if needed.   Max 2 in 24 hours

## 2020-11-20 NOTE — TELEPHONE ENCOUNTER
----- Message from Chelsei Morejon sent at 11/20/2020  8:49 AM EST -----  Regarding: Dr. Lexa Lemus telephone  Caller's first and last name and relationship (if not the patient): n/a  Best contact number(s):[phone]  What are the symptoms: Hives   Transfer successful - yes/no (include outcome):no- no answer  Transfer declined - yes/no (include reason): no- no answer   Was caller advised to seek appropriate level of care - yes/no: yes   Details to clarify the request: Pt has had hives every day since Ajovy shot

## 2020-11-20 NOTE — TELEPHONE ENCOUNTER
LVM with information per Naty Chow and stated to go to [email or web address] to register for card to take to pharmacy

## 2020-11-20 NOTE — TELEPHONE ENCOUNTER
S/w pt, she states 1st Ajovy injection went very well, significant decr in HA. After 2nd injection on Monday, she has had hives (1st time every) that are very persistent. S/w her pcp who feels it is possible the Ajovy and advised her to contact office. benadryl helped initially but has stopped working. She is leery of d/c it as it really has helped but she is unsure what else may be the cause of hives. Also wanted to report nurtec is very ineffective for HAs    Pt notified that I would forward msg to Jacob Franco as Dr Ham Smith is out.

## 2020-11-20 NOTE — TELEPHONE ENCOUNTER
Pt calling and thinks she might have reaction to something, she has gotten hives 2 times this week. Would like to speak to a nurse. She can be reached at . Sending to another nurse here in office today.

## 2020-11-20 NOTE — TELEPHONE ENCOUNTER
Where are the hives located? Just around the site of injection, or her entire body? We can use oral steroids to help with reaction to speed it up. Hard to say with only 2 done and one positive one negative. As long as she is not having any trouble with breathing we are ok. She may have to try a third to see if she is ok with that. Anything else change or new medications, diets, creams? I can also send in Ubrelvy to try for her PRN rescue of migraine.

## 2020-11-30 ENCOUNTER — OFFICE VISIT (OUTPATIENT)
Dept: FAMILY MEDICINE CLINIC | Age: 30
End: 2020-11-30
Payer: COMMERCIAL

## 2020-11-30 VITALS
HEART RATE: 86 BPM | HEIGHT: 66 IN | SYSTOLIC BLOOD PRESSURE: 119 MMHG | TEMPERATURE: 98.7 F | WEIGHT: 189 LBS | DIASTOLIC BLOOD PRESSURE: 79 MMHG | RESPIRATION RATE: 18 BRPM | OXYGEN SATURATION: 97 % | BODY MASS INDEX: 30.37 KG/M2

## 2020-11-30 DIAGNOSIS — Z00.00 WELL ADULT EXAM: Primary | ICD-10-CM

## 2020-11-30 DIAGNOSIS — I10 ESSENTIAL HYPERTENSION: ICD-10-CM

## 2020-11-30 LAB
ALBUMIN SERPL-MCNC: 4.1 G/DL (ref 3.5–5)
ALBUMIN/GLOB SERPL: 1.1 {RATIO} (ref 1.1–2.2)
ALP SERPL-CCNC: 86 U/L (ref 45–117)
ALT SERPL-CCNC: 34 U/L (ref 12–78)
ANION GAP SERPL CALC-SCNC: 7 MMOL/L (ref 5–15)
AST SERPL-CCNC: 17 U/L (ref 15–37)
BASOPHILS # BLD: 0 K/UL (ref 0–0.1)
BASOPHILS NFR BLD: 1 % (ref 0–1)
BILIRUB SERPL-MCNC: 1.2 MG/DL (ref 0.2–1)
BUN SERPL-MCNC: 18 MG/DL (ref 6–20)
BUN/CREAT SERPL: 18 (ref 12–20)
CALCIUM SERPL-MCNC: 9.4 MG/DL (ref 8.5–10.1)
CHLORIDE SERPL-SCNC: 106 MMOL/L (ref 97–108)
CHOLEST SERPL-MCNC: 262 MG/DL
CO2 SERPL-SCNC: 25 MMOL/L (ref 21–32)
CREAT SERPL-MCNC: 1 MG/DL (ref 0.55–1.02)
DIFFERENTIAL METHOD BLD: NORMAL
EOSINOPHIL # BLD: 0.2 K/UL (ref 0–0.4)
EOSINOPHIL NFR BLD: 4 % (ref 0–7)
ERYTHROCYTE [DISTWIDTH] IN BLOOD BY AUTOMATED COUNT: 12.7 % (ref 11.5–14.5)
GLOBULIN SER CALC-MCNC: 3.7 G/DL (ref 2–4)
GLUCOSE SERPL-MCNC: 89 MG/DL (ref 65–100)
HCT VFR BLD AUTO: 42.8 % (ref 35–47)
HDLC SERPL-MCNC: 64 MG/DL
HDLC SERPL: 4.1 {RATIO} (ref 0–5)
HGB BLD-MCNC: 13.7 G/DL (ref 11.5–16)
IMM GRANULOCYTES # BLD AUTO: 0 K/UL (ref 0–0.04)
IMM GRANULOCYTES NFR BLD AUTO: 0 % (ref 0–0.5)
LDLC SERPL CALC-MCNC: 181.4 MG/DL (ref 0–100)
LIPID PROFILE,FLP: ABNORMAL
LYMPHOCYTES # BLD: 1.5 K/UL (ref 0.8–3.5)
LYMPHOCYTES NFR BLD: 33 % (ref 12–49)
MCH RBC QN AUTO: 30 PG (ref 26–34)
MCHC RBC AUTO-ENTMCNC: 32 G/DL (ref 30–36.5)
MCV RBC AUTO: 93.9 FL (ref 80–99)
MONOCYTES # BLD: 0.4 K/UL (ref 0–1)
MONOCYTES NFR BLD: 9 % (ref 5–13)
NEUTS SEG # BLD: 2.5 K/UL (ref 1.8–8)
NEUTS SEG NFR BLD: 53 % (ref 32–75)
NRBC # BLD: 0 K/UL (ref 0–0.01)
NRBC BLD-RTO: 0 PER 100 WBC
PLATELET # BLD AUTO: 256 K/UL (ref 150–400)
PMV BLD AUTO: 10.5 FL (ref 8.9–12.9)
POTASSIUM SERPL-SCNC: 4.7 MMOL/L (ref 3.5–5.1)
PROT SERPL-MCNC: 7.8 G/DL (ref 6.4–8.2)
RBC # BLD AUTO: 4.56 M/UL (ref 3.8–5.2)
SODIUM SERPL-SCNC: 138 MMOL/L (ref 136–145)
TRIGL SERPL-MCNC: 83 MG/DL (ref ?–150)
TSH SERPL DL<=0.05 MIU/L-ACNC: 0.84 UIU/ML (ref 0.36–3.74)
VLDLC SERPL CALC-MCNC: 16.6 MG/DL
WBC # BLD AUTO: 4.6 K/UL (ref 3.6–11)

## 2020-11-30 PROCEDURE — 99395 PREV VISIT EST AGE 18-39: CPT | Performed by: NURSE PRACTITIONER

## 2020-11-30 RX ORDER — LABETALOL 200 MG/1
200 TABLET, FILM COATED ORAL 2 TIMES DAILY
Qty: 60 TAB | Refills: 5 | Status: SHIPPED | OUTPATIENT
Start: 2020-11-30 | End: 2021-12-21

## 2020-11-30 RX ORDER — LABETALOL 100 MG/1
400 TABLET, FILM COATED ORAL 2 TIMES DAILY
Qty: 60 TAB | Refills: 0 | Status: SHIPPED | OUTPATIENT
Start: 2020-11-30 | End: 2020-11-30

## 2020-11-30 RX ORDER — SERTRALINE HYDROCHLORIDE 50 MG/1
50 TABLET, FILM COATED ORAL DAILY
Qty: 30 TAB | Refills: 5 | Status: SHIPPED | OUTPATIENT
Start: 2020-11-30 | End: 2022-04-25 | Stop reason: ALTCHOICE

## 2020-11-30 NOTE — PROGRESS NOTES
Chief Complaint   Patient presents with    Annual Wellness Visit    Medication Refill     Pt in office today for wellness visit  -med refill    1. Have you been to the ER, urgent care clinic since your last visit? Hospitalized since your last visit? No    2. Have you seen or consulted any other health care providers outside of the [address] [place] [address] since your last visit? Include any pap smears or colon screening.  No     Pt has no other concerns

## 2020-11-30 NOTE — PROGRESS NOTES
Subjective:   27 y.o. female for Well Woman Check. Her gyne and breast care is done elsewhere by her Ob-Gyne physician. Patient Active Problem List    Diagnosis Date Noted    Term pregnancy 10/17/2019    Pregnant 10/16/2019    Allergic rhinitis 09/18/2018    Essential hypertension 10/03/2017    Bipolar affective disorder ([facility] 09/08/2017     Current Outpatient Medications   Medication Sig Dispense Refill    labetaloL (NORMODYNE) 200 mg tablet Take 1 Tab by mouth two (2) times a day. - dc 100mg dose of this med 60 Tab 5    sertraline (ZOLOFT) 50 mg tablet Take 1 Tab by mouth daily. 30 Tab 5    fremanezumab-vfrm (Ajovy Autoinjector) 225 mg/1.5 mL atIn 225 mg by SubCUTAneous route every thirty (30) days. 225 mg 3    NIFEdipine ER (PROCARDIA XL) 60 mg ER tablet Take 1 Tab by mouth daily. 30 Tab 2    ubrogepant (UBRELVY) 100 mg tablet 1 at HA onset and repeat in 2 hours if needed. Max 2 in 24 hours. BIN: [id number] PCN: [id number] GRP: [id number] ID: [id number] PLEASE RUN THIS CARD. 10 Tab 5    butalbital-acetaminophen-caffeine (FIORICET, ESGIC) -40 mg per tablet TAKE 1 TABLET BY MOUTH EVERY 6 HOURS AS NEEDED HEADACHE       Family History   Problem Relation Age of Onset    Hypertension Mother     Other Mother         fibromyalgia    Diabetes Mother     Sleep Apnea Mother     Hypertension Father      Social History     Tobacco Use    Smoking status: Never Smoker    Smokeless tobacco: Never Used   Substance Use Topics    Alcohol use: Yes     Alcohol/week: 5.0 standard drinks     Types: 1 Glasses of wine, 4 Shots of liquor per week     Comment:  wine daily, liquor on the weekends             ROS: Feeling generally well. No TIA's or unusual headaches, no dysphagia. No prolonged cough. No dyspnea or chest pain on exertion. No abdominal pain, change in bowel habits, black or bloody stools. No urinary tract symptoms. No new or unusual musculoskeletal symptoms.     Specific concerns today: needs refill of Labetolol and her zoloft    Objective: The patient appears well, alert, oriented x 3, in no distress. Visit Vitals  /79 (BP 1 Location: Left arm, BP Patient Position: Sitting)   Pulse 86   Temp 98.7 °F (37.1 °C) (Oral)   Resp 18   Ht 5' 6\" (1.676 m)   Wt 189 lb (85.7 kg)   LMP 11/21/2020   SpO2 97%   BMI 30.51 kg/m²     ENT normal.  Neck supple. No adenopathy or thyromegaly. JENNY. Lungs are clear, good air entry, no wheezes, rhonchi or rales. S1 and S2 normal, no murmurs, regular rate and rhythm. Abdomen soft without tenderness, guarding, mass or organomegaly. Extremities show no edema, normal peripheral pulses. Neurological is normal, no focal findings. Breast and Pelvic exams are deferred. Assessment/Plan:   Well Woman  lose weight, increase physical activity, follow low fat diet, follow low salt diet, routine labs ordered  Encounter Diagnoses   Name Primary?  Well adult exam Yes    Essential hypertension      Orders Placed This Encounter    LIPID PANEL    CBC WITH AUTOMATED DIFF    METABOLIC PANEL, COMPREHENSIVE    TSH 3RD GENERATION    DISCONTD: labetaloL (NORMODYNE) 100 mg tablet    labetaloL (NORMODYNE) 200 mg tablet    sertraline (ZOLOFT) 50 mg tablet     I have discussed the diagnosis with the patient and the intended plan as seen in the above orders. The patient has received an after-visit summary and questions were answered concerning future plans. Patient conveyed understanding of the plan at the time of the visit.     Lizzy Cisse, MSN, ANP  11/30/2020

## 2020-12-01 NOTE — PROGRESS NOTES
Hey there, your labs are incredibly high for cholesterol and much worse than when we checked it 2 years ago. This is genetic since it is so high. Do other family members have this high cholesterol as well? We need to consider taking a med to get it down. What are your thoughts. The rest of your labs for sugar, thyroid, and iron look great.  [employer]

## 2020-12-04 ENCOUNTER — TELEPHONE (OUTPATIENT)
Dept: FAMILY MEDICINE CLINIC | Age: 30
End: 2020-12-04

## 2020-12-04 RX ORDER — ROSUVASTATIN CALCIUM 5 MG/1
5 TABLET, COATED ORAL DAILY
Qty: 30 TAB | Refills: 5 | Status: SHIPPED | OUTPATIENT
Start: 2020-12-04 | End: 2021-12-21

## 2020-12-04 NOTE — TELEPHONE ENCOUNTER
Spoke with pt id x3 informed pt that med was sent and to return in 3mo fasting labs.  Pt verbalized understanding

## 2021-01-04 ENCOUNTER — OFFICE VISIT (OUTPATIENT)
Dept: NEUROLOGY | Age: 31
End: 2021-01-04
Payer: COMMERCIAL

## 2021-01-04 VITALS
HEART RATE: 64 BPM | SYSTOLIC BLOOD PRESSURE: 160 MMHG | TEMPERATURE: 96.8 F | OXYGEN SATURATION: 98 % | RESPIRATION RATE: 14 BRPM | BODY MASS INDEX: 30.51 KG/M2 | WEIGHT: 189 LBS | DIASTOLIC BLOOD PRESSURE: 104 MMHG

## 2021-01-04 DIAGNOSIS — G43.111 INTRACTABLE MIGRAINE WITH AURA WITH STATUS MIGRAINOSUS: Primary | ICD-10-CM

## 2021-01-04 DIAGNOSIS — E78.5 DYSLIPIDEMIA: ICD-10-CM

## 2021-01-04 DIAGNOSIS — I10 ESSENTIAL HYPERTENSION: ICD-10-CM

## 2021-01-04 PROCEDURE — 99214 OFFICE O/P EST MOD 30 MIN: CPT | Performed by: PSYCHIATRY & NEUROLOGY

## 2021-01-04 NOTE — PROGRESS NOTES
[facility] Neurology Clinics and [address] at [facility] Neurology Clinics at [address] [facility], [address] [place] [address] [facility], [address] [place] [address]   [phone]              Chief Complaint   Patient presents with    Migraine     Current Outpatient Medications   Medication Sig Dispense Refill    rosuvastatin (CRESTOR) 5 mg tablet Take 1 Tab by mouth daily. 30 Tab 5    labetaloL (NORMODYNE) 200 mg tablet Take 1 Tab by mouth two (2) times a day. - dc 100mg dose of this med 60 Tab 5    sertraline (ZOLOFT) 50 mg tablet Take 1 Tab by mouth daily. 30 Tab 5    NIFEdipine ER (PROCARDIA XL) 60 mg ER tablet Take 1 Tab by mouth daily. 30 Tab 2    ubrogepant (UBRELVY) 100 mg tablet 1 at HA onset and repeat in 2 hours if needed. Max 2 in 24 hours. BIN: [id number] PCN: [id number] GRP: [id number] ID: [id number] PLEASE RUN THIS CARD. 10 Tab 5      Allergies   Allergen Reactions    Sumatriptan Nausea Only, Other (comments) and Vertigo     Blurred vision & hallucinations    Ajovy Autoinjector [Fremanezumab-Vfrm] Hives     Social History     Tobacco Use    Smoking status: Never Smoker    Smokeless tobacco: Never Used   Substance Use Topics    Alcohol use: Yes     Alcohol/week: 5.0 standard drinks     Types: 1 Glasses of wine, 4 Shots of liquor per week     Comment:  wine daily, liquor on the weekends    Drug use: No     27-year-old lady returns today for follow-up intractable migraine headaches. Her last visit with me was October 1 of 2020 where I saw her for initial consultation regarding migraine. She also had hypertension and that was with an elevated blood pressure reading in the office. We started her on Ajovy and Nurtec. Subsequently phone call on 11/20 noted her to have had hives after her second injection. She was using Benadryl. A Medrol Dosepak was sent. Nurte0 for  was not effective. She was sent Ubrelvy.   Subsequent visit with Fernando Holguin on 11/34 general follow-up had laboratory analyses ordered including dyslipidemia and this was markedly elevated. CBC was unremarkable. Metabolic panel was unremarkable. TSH normal.  Crestor was subsequently started on 4 December 2020. Her Zoloft and labetalol were continued on 11/30/2020. Today she reports she did not use the Ubrelvy as there was some confusion about when she should take that she notes. Her headaches did improve with the Ajovy. She was having them at least weekly and she has only had about 3 since November. The Nurtec was not effective for her. She was really happy with how the Ajovy was doing and she has not taken her blood pressure medicine this morning. She has some questions about her left Strahl and parameters  Examination  Visit Vitals  BP (!) 160/104 (BP 1 Location: Left arm, BP Patient Position: Sitting)   Pulse 64   Temp 96.8 °F (36 °C)   Resp 14   Wt 85.7 kg (189 lb)   SpO2 98%   BMI 30.51 kg/m²     Very pleasant lady. Awake alert oriented and conversant. Normal speech lines were no ataxia     Impression/Plan  72-year-old lady with intractable migraines and side effect to her prescription medication Ajovy, headaches which at the present time frequency is decreased and question is whether this is secondary to a continuing Ajovy effect given the half-life is 31 days although she did not quite get the steady state versus just a good point versus other. Discussed options including Emgality.   Continue to monitor migraines and treat with Rosetta Aragon and discussed administration as well as side effects  If migraines are more frequent at her 2-month follow-up we will use Emgality which has the same mechanism as Ajovy and she did get a good response to that  If migraines are infrequent continue abortive therapy only    Hypertension--we discussed following that at home and taking her medication regularly    Dyslipidemia--we discussed diet as well as taking her statin and implications of having both elevated blood pressure this not controlled as well as dyslipidemia in terms of stroke and MI risk factors    Follow-up 2 months    Heron Salcedo MD      This note was created using voice recognition software. Despite editing, there may be syntax errors.

## 2021-01-04 NOTE — Clinical Note
1/4/2021 Patient: Bebeto Beatty YOB: 1990 Date of Visit: 1/4/2021 Steffi Vásquez NP 
[address] [facility] [address] [id number] [facility] [address] [address] [employer] Steffi Vásquez, Technician [address] [facility] [address] [facility] [address] [employer], NP [employer], Technician, Thank you for referring Ms. Raphael Cosby to [facility] for evaluation. My notes for this consultation are attached. If you have questions, please do not hesitate to call me. I look forward to following your patient along with you. Sincerely, Velta Burkitt, MD

## 2021-01-04 NOTE — PROGRESS NOTES
Chief Complaint   Patient presents with    Migraine     Nurtec does not help break HA, upon 2nd Ajovy injection, broke out in hives so she did not take 3rd dose. The Ajovy did work and HAs had decreased.   Only had 3 migraines since Nov    Did not start Yas Berrios

## 2021-01-06 ENCOUNTER — TELEPHONE (OUTPATIENT)
Dept: NEUROLOGY | Age: 31
End: 2021-01-06

## 2021-01-06 NOTE — TELEPHONE ENCOUNTER
RE [address] [place] [address] (Key: [id number])   Rx #: [id number]     EFFECTIVE PA Case: [id number], Status: Approved, Coverage Starts on: 1/6/2021 12:00:00 AM, Coverage Ends on: 1/6/2022 12:00:00 AM

## 2021-02-08 ENCOUNTER — OFFICE VISIT (OUTPATIENT)
Dept: FAMILY MEDICINE CLINIC | Age: 31
End: 2021-02-08
Payer: COMMERCIAL

## 2021-02-08 VITALS
OXYGEN SATURATION: 100 % | WEIGHT: 193 LBS | SYSTOLIC BLOOD PRESSURE: 152 MMHG | BODY MASS INDEX: 31.02 KG/M2 | TEMPERATURE: 98.8 F | DIASTOLIC BLOOD PRESSURE: 105 MMHG | HEART RATE: 93 BPM | RESPIRATION RATE: 18 BRPM | HEIGHT: 66 IN

## 2021-02-08 DIAGNOSIS — R22.0 LIP SWELLING: ICD-10-CM

## 2021-02-08 DIAGNOSIS — T78.3XXA ANGIOEDEMA, INITIAL ENCOUNTER: ICD-10-CM

## 2021-02-08 DIAGNOSIS — N92.6 MISSED MENSES: Primary | ICD-10-CM

## 2021-02-08 LAB
HCG URINE, QL. (POC): NEGATIVE
VALID INTERNAL CONTROL?: YES

## 2021-02-08 PROCEDURE — 99213 OFFICE O/P EST LOW 20 MIN: CPT | Performed by: NURSE PRACTITIONER

## 2021-02-08 PROCEDURE — 81025 URINE PREGNANCY TEST: CPT | Performed by: NURSE PRACTITIONER

## 2021-02-08 RX ORDER — CLINDAMYCIN HYDROCHLORIDE 300 MG/1
300 CAPSULE ORAL 3 TIMES DAILY
Qty: 30 CAP | Refills: 0 | Status: SHIPPED | OUTPATIENT
Start: 2021-02-08 | End: 2021-02-18

## 2021-02-08 RX ORDER — PREDNISONE 10 MG/1
TABLET ORAL
Qty: 21 TAB | Refills: 0 | Status: SHIPPED | OUTPATIENT
Start: 2021-02-08 | End: 2021-06-01 | Stop reason: ALTCHOICE

## 2021-02-08 NOTE — PROGRESS NOTES
Chief Complaint   Patient presents with    Lip Swelling    Missed Menses     Patient in office today lip swelling that began on Saturday. Pt states lip swelling worsened on Saturday. Pt denies changes in lip care products, new foods or spices, no changes in toothpaste or toothbrush. Denies pain or itching. Have been treating with hydrocortisone and benadryl-with no improvement. Pt have c/o of missed menses. Pt states she is 2 wks late. Pt have noted mild cramping for the past 3 wks. Associated nausea, denies any breast tenderness. Feels bloated. Usually gets her period on time. Has been having menstrual style cramping without any bleeding for the last 3 weeks. Sees OBGYN at the [facility]. LMP 12/29. Last sexual activity was Saturday. Had shellfish but after the swelling started. Denies any throat irritation, SOB and dyspnea. Denies any new medications. Recalls having this occur 6 months ago. Resolved after a week. Started on the left side this time and   Red dot on the upper lip, not raised, occasional pains but not constant. Has been applying ice and taking benadryl without relief. Takes BP medications daily but forgot to take this morning. Monitors her BP at home and reports good readings. Denies any other concerns at this time. Chief Complaint   Patient presents with    Lip Swelling    Missed Menses     she is a 27y.o. year old female who presents for evalution. Reviewed PmHx, RxHx, FmHx, SocHx, AllgHx and updated and dated in the chart.     Review of Systems - negative except as listed above in the HPI    Objective:     Vitals:    02/08/21 1519   BP: (!) 152/105   Pulse: 93   Resp: 18   Temp: 98.8 °F (37.1 °C)   TempSrc: Oral   SpO2: 100%   Weight: 193 lb (87.5 kg)   Height: 5' 6\" (1.676 m)     Physical Examination: General appearance - alert, well appearing, and in no distress  Eyes - pupils equal and reactive, extraocular eye movements intact  Ears - bilateral TM's and external ear canals normal  Nose - normal and patent, no erythema, discharge or polyps and normal nontender sinuses  Mouth - mucous membranes moist, pharynx normal without lesions; upper lip sig swollen - approx 3 x size of normal, no visible ulceration on the lip, no visible head or drainage, very tender with manipulation  Neck - supple, no significant adenopathy  Chest - clear to auscultation, no wheezes, rales or rhonchi, symmetric air entry  Heart - normal rate, regular rhythm, normal S1, S2, no murmurs    Assessment/ Plan:   Diagnoses and all orders for this visit:    1. Missed menses  -     AMB POC URINE PREGNANCY TEST, VISUAL COLOR COMPARISON  -     BETA HCG, QT; Future  Neg UPT. Pt is currently breastfeeding her 3year old but has had periods throughout. Recommended if she has nother missed menses, she call OBGYN to discuss. 2. Angioedema, initial encounter  -     FOOD ALLERGY PROFILE; Future  -     ALLERGEN PROFILE, ZONE 3; Future  -     CBC WITH AUTOMATED DIFF; Future  Will notify results and deviate plan based on findings. Recalls episode of this happening about 6 months ago so checking for allergy. 3. Lip swelling  -     clindamycin (CLEOCIN) 300 mg capsule; Take 1 Cap by mouth three (3) times daily for 10 days. -     predniSONE (STERAPRED DS) 10 mg dose pack; See administration instruction per 10mg dose pack  Unclear if swelling due to allergy or infection. Recommended we treat for both with abx and steroid. Complete as directed. Continue other supportive measures. Follow up if sx return or worsen. Follow-up and Dispositions    · Return if symptoms worsen or fail to improve. I have discussed the diagnosis with the patient and the intended plan as seen in the above orders. The patient has received an after-visit summary and questions were answered concerning future plans.      Medication Side Effects and Warnings were discussed with patient: yes  Patient Labs were reviewed and or requested: yes  Patient Past Records were reviewed and or requested  yes  Patient / Caregiver Understanding of treatment plan was verbalized during office visit YES    COLLINS Lopez    There are no Patient Instructions on file for this visit.

## 2021-02-09 NOTE — PROGRESS NOTES
Please notify pt that her labs confirm she is NOT pregnant. CBC also normal. Will notify her when I receive and review results of her food and env allergy profiles.

## 2021-02-11 LAB
A ALTERNATA IGE QN: <0.1 KU/L
A FUMIGATUS IGE QN: <0.1 KU/L
AMER ROACH IGE QN: <0.1 KU/L
BAHIA GRASS IGE QN: <0.1 KU/L
BASOPHILS # BLD: 0 K/UL (ref 0–0.1)
BASOPHILS NFR BLD: 1 % (ref 0–1)
BERMUDA GRASS IGE QN: <0.1 KU/L
BOXELDER IGE QN: <0.1 KU/L
C HERBARUM IGE QN: <0.1 KU/L
CAT DANDER IGG QN: <0.1 KU/L
CLAM IGE QN: <0.1 KU/L
CLASS DESCRIPTION, 600268: ABNORMAL
CLASS DESCRIPTION, 600268: NORMAL
CODFISH IGE QN: <0.1 KU/L
COMMON RAGWEED IGE QN: <0.1 KU/L
CORN IGE QN: <0.1 KU/L
COW MILK IGE QN: 0.17 KU/L
D FARINAE IGE QN: <0.1 KU/L
D PTERONYSS IGE QN: <0.1 KU/L
DEPRECATED IGE QN: <0.1 KU/L
DIFFERENTIAL METHOD BLD: NORMAL
DOG DANDER IGE QN: <0.1 KU/L
EGG WHITE IGE QN: <0.1 KU/L
ENGL PLANTAIN IGE QN: <0.1 KU/L
EOSINOPHIL # BLD: 0.2 K/UL (ref 0–0.4)
EOSINOPHIL NFR BLD: 3 % (ref 0–7)
ERYTHROCYTE [DISTWIDTH] IN BLOOD BY AUTOMATED COUNT: 12.5 % (ref 11.5–14.5)
HCG SERPL-ACNC: <1 MIU/ML (ref 0–6)
HCT VFR BLD AUTO: 39 % (ref 35–47)
HGB BLD-MCNC: 13 G/DL (ref 11.5–16)
IMM GRANULOCYTES # BLD AUTO: 0 K/UL (ref 0–0.04)
IMM GRANULOCYTES NFR BLD AUTO: 0 % (ref 0–0.5)
JOHNSON GRASS IGE QN: <0.1 KU/L
KENT BLUE GRASS IGE QN: <0.1 KU/L
LYMPHOCYTES # BLD: 2 K/UL (ref 0.8–3.5)
LYMPHOCYTES NFR BLD: 27 % (ref 12–49)
M RACEMOSUS IGE QN: <0.1 KU/L
MCH RBC QN AUTO: 30.2 PG (ref 26–34)
MCHC RBC AUTO-ENTMCNC: 33.3 G/DL (ref 30–36.5)
MCV RBC AUTO: 90.7 FL (ref 80–99)
MONOCYTES # BLD: 0.6 K/UL (ref 0–1)
MONOCYTES NFR BLD: 9 % (ref 5–13)
MT JUNIPER IGE QN: <0.1 KU/L
NETTLE IGE QN: <0.1 KU/L
NEUTS SEG # BLD: 4.4 K/UL (ref 1.8–8)
NEUTS SEG NFR BLD: 60 % (ref 32–75)
NRBC # BLD: 0 K/UL (ref 0–0.01)
NRBC BLD-RTO: 0 PER 100 WBC
P NOTATUM IGE QN: <0.1 KU/L
PEANUT IGE QN: <0.1 KU/L
PLATELET # BLD AUTO: 246 K/UL (ref 150–400)
PMV BLD AUTO: 11 FL (ref 8.9–12.9)
RBC # BLD AUTO: 4.3 M/UL (ref 3.8–5.2)
S BOTRYOSUM IGE QN: <0.1 KU/L
SCALLOP IGE QN: <0.1 KU/L
SESAME SEED IGE QN: <0.1 KU/L
SHRIMP IGE QN: <0.1 KU/L
SOYBEAN IGE QN: <0.1 KU/L
WALNUT IGE QN: <0.1 KU/L
WBC # BLD AUTO: 7.2 K/UL (ref 3.6–11)
WHEAT IGE QN: <0.1 KU/L
WHITE BIRCH IGE QN: <0.1 KU/L
WHITE ELM IGG QN: <0.1 KU/L
WHITE HICKORY IGE QN: <0.1 KU/L
WHITE MULBERRY IGE QN: <0.1 KU/L
WHITE OAK IGE QN: <0.1 KU/L

## 2021-02-11 NOTE — PROGRESS NOTES
Please notify pt that all of her allergy testing looks normal. No evidence of any env allergies. She has a low/equivocal elevation to milk but I don't think her sx are due to a true milk allergy. Hopefully things clear up with meds prescribed during OV. Enc to reach out if sx persist or recur.

## 2021-05-31 ENCOUNTER — PATIENT MESSAGE (OUTPATIENT)
Dept: NEUROLOGY | Age: 31
End: 2021-05-31

## 2021-06-01 ENCOUNTER — OFFICE VISIT (OUTPATIENT)
Dept: NEUROLOGY | Age: 31
End: 2021-06-01
Payer: COMMERCIAL

## 2021-06-01 VITALS
SYSTOLIC BLOOD PRESSURE: 189 MMHG | OXYGEN SATURATION: 98 % | BODY MASS INDEX: 31.15 KG/M2 | RESPIRATION RATE: 16 BRPM | HEART RATE: 69 BPM | WEIGHT: 193 LBS | DIASTOLIC BLOOD PRESSURE: 112 MMHG

## 2021-06-01 DIAGNOSIS — G43.111 INTRACTABLE MIGRAINE WITH AURA WITH STATUS MIGRAINOSUS: Primary | ICD-10-CM

## 2021-06-01 PROCEDURE — 99214 OFFICE O/P EST MOD 30 MIN: CPT | Performed by: PSYCHIATRY & NEUROLOGY

## 2021-06-01 PROCEDURE — 96372 THER/PROPH/DIAG INJ SC/IM: CPT | Performed by: PSYCHIATRY & NEUROLOGY

## 2021-06-01 RX ORDER — KETOROLAC TROMETHAMINE 30 MG/ML
60 INJECTION, SOLUTION INTRAMUSCULAR; INTRAVENOUS ONCE
Qty: 1 VIAL | Refills: 0
Start: 2021-06-01 | End: 2021-06-01

## 2021-06-01 NOTE — TELEPHONE ENCOUNTER
From: Kathy Bryant  To: Santo Phillip MD  Sent: 5/31/2021 7:47 PM EDT  Subject: Prescription Question    Hi Dr Maira Gallegos. Its been a couple of months since I saw you last. I have had a migraine for almost 2 weeks straight with maybe 1 or two days of relief during that time. I am planing [facility]. But I wanted to ask what prescription I should be taking now. The last injectable is no longer an option as I am allergic. But I am desperate to try something else.

## 2021-06-01 NOTE — TELEPHONE ENCOUNTER
S/w pt.  appt offered to pt to discuss.   [facility]

## 2021-06-01 NOTE — PROGRESS NOTES
[facility] and [address] at [facility] Neurology Clinics at [address] [facility], [address] [facility] [address] [facility], [address] [place] [address]   [phone]              Chief Complaint   Patient presents with    Migraine     Current Outpatient Medications   Medication Sig Dispense Refill    rosuvastatin (CRESTOR) 5 mg tablet Take 1 Tab by mouth daily. 30 Tab 5    sertraline (ZOLOFT) 50 mg tablet Take 1 Tab by mouth daily. 30 Tab 5    NIFEdipine ER (PROCARDIA XL) 60 mg ER tablet Take 1 Tab by mouth daily. 30 Tab 2    labetaloL (NORMODYNE) 200 mg tablet Take 1 Tab by mouth two (2) times a day. - dc 100mg dose of this med 60 Tab 5    ubrogepant (UBRELVY) 100 mg tablet 1 at HA onset and repeat in 2 hours if needed. Max 2 in 24 hours. BIN: [id number] PCN: [id number] GRP: [id number] ID: [id number] PLEASE RUN THIS CARD. (Patient not taking: Reported on 6/1/2021) 10 Tab 5      Allergies   Allergen Reactions    Sumatriptan Nausea Only, Other (comments) and Vertigo     Blurred vision & hallucinations    Ajovy Autoinjector [Fremanezumab-Vfrm] Hives     Social History     Tobacco Use    Smoking status: Never Smoker    Smokeless tobacco: Never Used   Substance Use Topics    Alcohol use: Yes     Alcohol/week: 5.0 standard drinks     Types: 1 Glasses of wine, 4 Shots of liquor per week     Comment:  wine daily, liquor on the weekends    Drug use: No     Patient returns today for follow-up intractable migraine headaches. She has some side effects with Ajovy. Started way maintained Ubrelvy and were going to track headaches for a while and decide whether or not we should change over to [facility]. Today she reports she was better having about 1 migraine every couple of weeks. They got worse just about 2 weeks or so ago. Unremitting migraine.   Last night she was taking all types of things trying to break the headache and she almost went to the emergency department. Some confusion about Saul Goode and we discussed that today and that may of related to the fact she needed a prior Auth. Nurtec was not effective    Examination  Visit Vitals  BP (!) 189/112 (BP 1 Location: Right arm, BP Patient Position: Sitting, BP Cuff Size: Large adult)   Pulse 69   Resp 16   Wt 87.5 kg (193 lb)   SpO2 98%   BMI 31.15 kg/m²     Very pleasant lady. Awake alert oriented and conversant. Normal speech and language. Normal cognition. No ataxia    Impression/Plan  Intractable migraine worse now status migrainosus  Toradol injection today  Discussed options and I really think with the frequency right now if we can knock down the number of headache days with treating her effectively we can avoid a preventative but certainly if not we will go to [facility] [employer] for acute treatment and track headaches over the next 2 months and depending on her frequency we will decide about Emgality  Elevated blood pressure today likely secondary to the fact that she has continued headache and needs to take her medication as well. Recheck that before she leaves. Arturo Henry MD          This note was created using voice recognition software. Despite editing, there may be syntax errors.

## 2021-06-07 ENCOUNTER — PATIENT MESSAGE (OUTPATIENT)
Dept: NEUROLOGY | Age: 31
End: 2021-06-07

## 2021-06-07 DIAGNOSIS — G43.111 INTRACTABLE MIGRAINE WITH AURA WITH STATUS MIGRAINOSUS: Primary | ICD-10-CM

## 2021-06-08 RX ORDER — GALCANEZUMAB 120 MG/ML
120 INJECTION, SOLUTION SUBCUTANEOUS
Qty: 1 ML | Refills: 3 | Status: SHIPPED | OUTPATIENT
Start: 2021-06-08 | End: 2021-08-03 | Stop reason: SDUPTHER

## 2021-06-08 RX ORDER — GALCANEZUMAB 120 MG/ML
240 INJECTION, SOLUTION SUBCUTANEOUS ONCE
Qty: 2 ML | Refills: 0 | Status: SHIPPED | COMMUNITY
Start: 2021-06-08 | End: 2021-06-08

## 2021-06-08 NOTE — TELEPHONE ENCOUNTER
Carol Rocha MD 6/7/2021 5:42 PM EDT    Start emgality std directions  ----- Message -----  From: Max Yepez RN  Sent: 6/7/2021 4:49 PM EDT  To: Salima Bowres MD  Subject: FW: Prescription Question       ----- Message -----  From: Gregg Orlando  Sent: 6/7/2021 3:59 PM EDT  To: [facility] Nurse Pool  Subject: Prescription Question     Dr. Mary Kate Hall , the new prescription is not helping at all. Today I am headed to the er. Wanted to update you. Hope there is something else that can be done.

## 2021-06-09 ENCOUNTER — OFFICE VISIT (OUTPATIENT)
Dept: NEUROLOGY | Age: 31
End: 2021-06-09

## 2021-06-09 DIAGNOSIS — G43.111 INTRACTABLE MIGRAINE WITH AURA WITH STATUS MIGRAINOSUS: Primary | ICD-10-CM

## 2021-06-09 RX ORDER — GALCANEZUMAB 120 MG/ML
240 INJECTION, SOLUTION SUBCUTANEOUS ONCE
Qty: 2 ML | Refills: 0 | Status: SHIPPED | COMMUNITY
Start: 2021-06-09 | End: 2021-06-09

## 2021-06-09 NOTE — PROGRESS NOTES
Nurse visit today to initiate Emgality injections. Loading dose sample ordered and given to pt in both arms. Pt tolerated well. Discussed instructions with pt. Pt verbalized understanding.

## 2021-07-06 ENCOUNTER — TELEPHONE (OUTPATIENT)
Dept: NEUROLOGY | Age: 31
End: 2021-07-06

## 2021-07-06 NOTE — TELEPHONE ENCOUNTER
----- Message from Kristin Ronquillo sent at 7/6/2021 12:14 PM EDT -----  Regarding: / Telephone    Caller's first and last name: N/A  Reason for call: Questions   Best contact number(s): [phone]  Details to clarify the request: Pt stated that she wants to know if she needs to come in for a loading dosage for (Emgality Pen) 120 mg/mL injection? Pt stated that she wants to know if she should take the injection herself today ? Pt stated that she needs a call back today from [address] [place] [address] no later and she was told the 24 hr turn around time.

## 2021-07-07 NOTE — TELEPHONE ENCOUNTER
PA initiated through [address] [place] [address] key # [id number] PA Case ID: [id number]    PA Case: [id number], Status: Approved, Coverage Starts on: 7/7/2021 12:00:00 AM, Coverage Ends on: 8/8/2021 12:00:00 AM.

## 2021-07-25 NOTE — LETTER
5/30/2018 9:48 AM 
 
Ms. Travis Olvera [address] [facility] [address] Please excuse Ms. Blairgabby Miller today from work due to illness. Sincerely, Kayy Hobson NP 
 

show

## 2021-08-03 ENCOUNTER — OFFICE VISIT (OUTPATIENT)
Dept: NEUROLOGY | Age: 31
End: 2021-08-03
Payer: COMMERCIAL

## 2021-08-03 VITALS
RESPIRATION RATE: 18 BRPM | HEART RATE: 73 BPM | HEIGHT: 66 IN | BODY MASS INDEX: 31.02 KG/M2 | WEIGHT: 193 LBS | OXYGEN SATURATION: 98 % | SYSTOLIC BLOOD PRESSURE: 168 MMHG | DIASTOLIC BLOOD PRESSURE: 98 MMHG

## 2021-08-03 DIAGNOSIS — G43.111 INTRACTABLE MIGRAINE WITH AURA WITH STATUS MIGRAINOSUS: ICD-10-CM

## 2021-08-03 PROCEDURE — 99213 OFFICE O/P EST LOW 20 MIN: CPT | Performed by: PSYCHIATRY & NEUROLOGY

## 2021-08-03 RX ORDER — GALCANEZUMAB 120 MG/ML
120 INJECTION, SOLUTION SUBCUTANEOUS
Qty: 1 ML | Refills: 5 | Status: SHIPPED | OUTPATIENT
Start: 2021-08-03 | End: 2022-08-16

## 2021-08-03 RX ORDER — BUTALBITAL, ACETAMINOPHEN AND CAFFEINE 50; 325; 40 MG/1; MG/1; MG/1
1 TABLET ORAL
Qty: 20 TABLET | Refills: 2 | Status: SHIPPED | OUTPATIENT
Start: 2021-08-03 | End: 2022-06-06

## 2021-08-03 NOTE — PROGRESS NOTES
[facility] Neurology Clinics and [address] at [facility] Neurology Clinics at 42 [facility], [address] [place] [address] [address] [facility], [address] [place] [address]   [phone]              Chief Complaint   Patient presents with    Migraine     a lot better since Emgality start. 3/30 HA days, less severe. fioricet works now with [employer]. last dose 7/6/21     Current Outpatient Medications   Medication Sig Dispense Refill    galcanezumab-gnlm (Emgality Pen) 120 mg/mL injection 1 mL by SubCUTAneous route every twenty-eight (28) days. 1 mL 5    rosuvastatin (CRESTOR) 5 mg tablet Take 1 Tab by mouth daily. 30 Tab 5    labetaloL (NORMODYNE) 200 mg tablet Take 1 Tab by mouth two (2) times a day. - dc 100mg dose of this med 60 Tab 5    sertraline (ZOLOFT) 50 mg tablet Take 1 Tab by mouth daily. 30 Tab 5    NIFEdipine ER (PROCARDIA XL) 60 mg ER tablet Take 1 Tab by mouth daily. 30 Tab 2    ubrogepant (UBRELVY) 100 mg tablet 1 at HA onset and repeat in 2 hours if needed. Max 2 in 24 hours. BIN: [id number] PCN: [id number] GRP: [id number] ID: [id number] PLEASE RUN THIS CARD. (Patient not taking: Reported on 8/3/2021) 10 Tablet 5      Allergies   Allergen Reactions    Sumatriptan Nausea Only, Other (comments) and Vertigo     Blurred vision & hallucinations    Ajovy Autoinjector [Fremanezumab-Vfrm] Hives     Social History     Tobacco Use    Smoking status: Never Smoker    Smokeless tobacco: Never Used   Substance Use Topics    Alcohol use: Yes     Alcohol/week: 5.0 standard drinks     Types: 1 Glasses of wine, 4 Shots of liquor per week     Comment:  wine daily, liquor on the weekends    Drug use: No     49-year-old lady returns today for follow-up migraine headaches intractable type. Last visit we started Emgality. She notes that her headache frequency has responded well. She was having unremitting migraine. She is having about 3/month now. Fioricet is working well for abortive therapy. Erick Luke was ineffective. Examination  Visit Vitals  BP (!) 168/98 (BP 1 Location: Right arm, BP Patient Position: Sitting, BP Cuff Size: Large adult)   Pulse 73   Resp 18   Ht 5' 6\" (1.676 m)   Wt 87.5 kg (193 lb)   LMP 07/29/2021 (Exact Date)   SpO2 98%   Breastfeeding No   BMI 31.15 kg/m²     Awake alert oriented and conversant. Normal speech and language. No ataxia    Impression/Plan  Intractable migraine better  Continue Emgality  Continue limited Fioricet  Follow-up in 4 months    Nelson Banegas MD        This note was created using voice recognition software. Despite editing, there may be syntax errors.

## 2021-08-03 NOTE — PROGRESS NOTES
Chief Complaint   Patient presents with    Migraine     a lot better since Emgality start. 3/30 HA days, less severe. fioricet works now with [employer]. last dose 7/6/21     Not taking Ubrelvy.   Not as effective as fioricet

## 2021-09-23 RX ORDER — ONDANSETRON 4 MG/1
4 TABLET, ORALLY DISINTEGRATING ORAL
Qty: 20 TABLET | Refills: 0 | Status: SHIPPED | OUTPATIENT
Start: 2021-09-23

## 2021-12-17 ENCOUNTER — TELEPHONE (OUTPATIENT)
Dept: FAMILY MEDICINE CLINIC | Age: 31
End: 2021-12-17

## 2021-12-17 RX ORDER — AZITHROMYCIN 250 MG/1
TABLET, FILM COATED ORAL
Qty: 6 TABLET | Refills: 0 | Status: SHIPPED | OUTPATIENT
Start: 2021-12-17 | End: 2021-12-22

## 2021-12-17 NOTE — TELEPHONE ENCOUNTER
Patient called and states that she has had a sinus infection for about a week. She states that there was a little blood in her mucus a few days ago, but has not happened in the last couple of days. She would like to have something called in to the pharmacy since no appts available. Please call her at [phone]. She states to leave a message if she does not answer.

## 2021-12-21 RX ORDER — ROSUVASTATIN CALCIUM 5 MG/1
TABLET, COATED ORAL
Qty: 30 TABLET | Refills: 5 | Status: SHIPPED | OUTPATIENT
Start: 2021-12-21

## 2021-12-21 RX ORDER — LABETALOL 200 MG/1
TABLET, FILM COATED ORAL
Qty: 60 TABLET | Refills: 5 | Status: SHIPPED | OUTPATIENT
Start: 2021-12-21 | End: 2022-04-25 | Stop reason: ALTCHOICE

## 2022-01-17 ENCOUNTER — TELEPHONE (OUTPATIENT)
Dept: NEUROLOGY | Age: 32
End: 2022-01-17

## 2022-01-17 NOTE — TELEPHONE ENCOUNTER
PA initiated through [address] [place] [address] key #  [id number] - PA Case ID: [id number]    Approvedtoday  PA Case: [id number], Status: Approved, Coverage Starts on: 1/17/2022 12:00:00 AM, Coverage Ends on: 1/17/2023 12:00:00 AM

## 2022-03-18 PROBLEM — G43.111 INTRACTABLE MIGRAINE WITH AURA WITH STATUS MIGRAINOSUS: Status: ACTIVE | Noted: 2021-01-04

## 2022-03-18 PROBLEM — F31.9 BIPOLAR AFFECTIVE DISORDER (HCC): Status: ACTIVE | Noted: 2017-09-08

## 2022-03-19 PROBLEM — Z34.90 PREGNANT: Status: ACTIVE | Noted: 2019-10-16

## 2022-03-19 PROBLEM — E78.5 DYSLIPIDEMIA: Status: ACTIVE | Noted: 2021-01-04

## 2022-03-19 PROBLEM — I10 ESSENTIAL HYPERTENSION: Status: ACTIVE | Noted: 2017-10-03

## 2022-03-20 PROBLEM — J30.9 ALLERGIC RHINITIS: Status: ACTIVE | Noted: 2018-09-18

## 2022-03-20 PROBLEM — Z34.90 TERM PREGNANCY: Status: ACTIVE | Noted: 2019-10-17

## 2022-04-25 ENCOUNTER — OFFICE VISIT (OUTPATIENT)
Dept: FAMILY MEDICINE CLINIC | Age: 32
End: 2022-04-25
Payer: COMMERCIAL

## 2022-04-25 ENCOUNTER — HOSPITAL ENCOUNTER (OUTPATIENT)
Dept: LAB | Age: 32
Discharge: HOME | End: 2022-04-25
Payer: COMMERCIAL

## 2022-04-25 VITALS
HEIGHT: 66 IN | BODY MASS INDEX: 30.86 KG/M2 | OXYGEN SATURATION: 98 % | TEMPERATURE: 98.5 F | DIASTOLIC BLOOD PRESSURE: 75 MMHG | WEIGHT: 192 LBS | HEART RATE: 82 BPM | SYSTOLIC BLOOD PRESSURE: 120 MMHG | RESPIRATION RATE: 16 BRPM

## 2022-04-25 DIAGNOSIS — F31.9 BIPOLAR AFFECTIVE DISORDER, REMISSION STATUS UNSPECIFIED (HCC): ICD-10-CM

## 2022-04-25 DIAGNOSIS — Z00.00 WELL ADULT EXAM: ICD-10-CM

## 2022-04-25 DIAGNOSIS — Z01.419 ENCOUNTER FOR CERVICAL PAP SMEAR WITH PELVIC EXAM: Primary | ICD-10-CM

## 2022-04-25 PROCEDURE — 99395 PREV VISIT EST AGE 18-39: CPT | Performed by: NURSE PRACTITIONER

## 2022-04-25 PROCEDURE — 87624 HPV HI-RISK TYP POOLED RSLT: CPT

## 2022-04-25 PROCEDURE — 88175 CYTOPATH C/V AUTO FLUID REDO: CPT

## 2022-04-25 RX ORDER — ETONOGESTREL AND ETHINYL ESTRADIOL 11.7; 2.7 MG/1; MG/1
INSERT, EXTENDED RELEASE VAGINAL
Qty: 1 EACH | Refills: 11 | Status: SHIPPED | OUTPATIENT
Start: 2022-04-25

## 2022-04-25 RX ORDER — METOPROLOL SUCCINATE 50 MG/1
50 TABLET, EXTENDED RELEASE ORAL DAILY
Qty: 30 TABLET | Refills: 5 | Status: SHIPPED | OUTPATIENT
Start: 2022-04-25 | End: 2022-06-06

## 2022-04-25 NOTE — PROGRESS NOTES
Chief Complaint   [facility]   [facility]    Labs    Gyn Exam     Pt being seen for wellness visit  -labs  -pap    1. Have you been to the ER, urgent care clinic since your last visit? Hospitalized since your last visit? No    2. Have you seen or consulted any other health care providers outside of the [address] [place] [address] since your last visit? Include any pap smears or colon screening.  No     Pt has no other concerns

## 2022-04-25 NOTE — PROGRESS NOTES
Subjective:   32 y.o. female for Well Woman Check. No LMP recorded (lmp unknown). Social History: single partner, contraception - none. Pertinent past medical hstory: no history of HTN, DVT, CAD, DM, liver disease, migraines or smoking. Patient Active Problem List    Diagnosis Date Noted    Dyslipidemia 01/04/2021    Intractable migraine with aura with status migrainosus 01/04/2021    Term pregnancy 10/17/2019    Pregnant 10/16/2019    Allergic rhinitis 09/18/2018    Essential hypertension 10/03/2017    Bipolar affective disorder ([facility] [address] 09/08/2017     Current Outpatient Medications   Medication Sig Dispense Refill    metoprolol succinate (TOPROL-XL) 50 mg XL tablet Take 1 Tablet by mouth daily. 30 Tablet 5    ethinyl estradiol-etonogestrel (NuvaRing) 0.12-0.015 mg/24 hr vaginal ring Insert 21 days and remove for 7 days then restart with new ring 1 Each 11    rosuvastatin (CRESTOR) 5 mg tablet TAKE 1 TABLET BY MOUTH EVERY DAY 30 Tablet 5    ondansetron (ZOFRAN ODT) 4 mg disintegrating tablet Take 1 Tablet by mouth every eight (8) hours as needed for Nausea or Vomiting. (Patient not taking: Reported on 4/25/2022) 20 Tablet 0    galcanezumab-gnlm (Emgality Pen) 120 mg/mL injection 1 mL by SubCUTAneous route every twenty-eight (28) days. 1 mL 5    butalbital-acetaminophen-caffeine (FIORICET, ESGIC) -40 mg per tablet Take 1 Tablet by mouth every six (6) hours as needed for Headache. 20 Tablet 2    NIFEdipine ER (PROCARDIA XL) 60 mg ER tablet Take 1 Tab by mouth daily. 30 Tab 2        ROS:  Feeling well. No dyspnea or chest pain on exertion. No abdominal pain, change in bowel habits, black or bloody stools. No urinary tract symptoms. GYN ROS: normal menses, no abnormal bleeding, pelvic pain or discharge, no breast pain or new or enlarging lumps on self exam. No neurological complaints.     Is requesting to have her blood pressure medication labetalol changed to a once a day blood pressure pill that she took prior to her pregnancy 2 years ago. She is having a hard time remembering to take the second pill in the afternoon. She is also on Procardia once a day. If she misses her labetalol her blood pressure is extremely elevated. On review of the chart she used to be on Toprol-XL 50 mg once daily and did well on that med. She is requesting to also restart her NuvaRing for birth control since she is now finished breast-feeding. Objective:     Visit Vitals  /75 (BP 1 Location: Left upper arm, BP Patient Position: Sitting)   Pulse 82   Temp 98.5 °F (36.9 °C) (Oral)   Resp 16   Ht 5' 6\" (1.676 m)   Wt 192 lb (87.1 kg)   LMP  (LMP Unknown)   SpO2 98%   BMI 30.99 kg/m²     The patient appears well, alert, oriented x 3, in no distress. ENT normal.  Neck supple. No adenopathy or thyromegaly. JENNY. Lungs are clear, good air entry, no wheezes, rhonchi or rales. S1 and S2 normal, no murmurs, regular rate and rhythm. Abdomen soft without tenderness, guarding, mass or organomegaly. Extremities show no edema, normal peripheral pulses. Neurological is normal, no focal findings. BREAST EXAM: not examined    PELVIC EXAM: normal external genitalia, vulva, vagina, cervix, uterus and adnexa    Assessment/Plan:   well woman  pap smear  additional lab tests per orders  return annually or prn  Encounter Diagnoses   Name Primary?  Encounter for cervical Pap smear with pelvic exam Yes    Bipolar affective disorder, remission status unspecified ([facility] [address]     Well adult exam      Orders Placed This Encounter    CBC WITH AUTOMATED DIFF    METABOLIC PANEL, COMPREHENSIVE    TSH 3RD GENERATION    LIPID PANEL    metoprolol succinate (TOPROL-XL) 50 mg XL tablet    ethinyl estradiol-etonogestrel (NuvaRing) 0.12-0.015 mg/24 hr vaginal ring    PAP IG, APTIMA HPV AND RFX 16/18,45 ([id number])   .   She was given metoprolol XL 50 mg 1 a day and she will [employer] message blood pressure log on Friday to ensure that the medication is effective. She was also renewed with her NuvaRing to start with her next cycle. Options for high blood pressure on hormone therapy were extensively reviewed. She is not to start the NuvaRing and to be assured that her blood pressure is well controlled on her new blood pressure regimen.   Israel Daugherty, ANP

## 2022-04-26 LAB
ALBUMIN SERPL-MCNC: 4.1 G/DL (ref 3.5–5)
ALBUMIN/GLOB SERPL: 1.1 {RATIO} (ref 1.1–2.2)
ALP SERPL-CCNC: 57 U/L (ref 45–117)
ALT SERPL-CCNC: 53 U/L (ref 12–78)
ANION GAP SERPL CALC-SCNC: 8 MMOL/L (ref 5–15)
AST SERPL-CCNC: 42 U/L (ref 15–37)
BASOPHILS # BLD: 0 K/UL (ref 0–0.1)
BASOPHILS NFR BLD: 1 % (ref 0–1)
BILIRUB SERPL-MCNC: 0.7 MG/DL (ref 0.2–1)
BUN SERPL-MCNC: 14 MG/DL (ref 6–20)
BUN/CREAT SERPL: 13 (ref 12–20)
CALCIUM SERPL-MCNC: 9.4 MG/DL (ref 8.5–10.1)
CHLORIDE SERPL-SCNC: 102 MMOL/L (ref 97–108)
CHOLEST SERPL-MCNC: 236 MG/DL
CO2 SERPL-SCNC: 26 MMOL/L (ref 21–32)
CREAT SERPL-MCNC: 1.08 MG/DL (ref 0.55–1.02)
DIFFERENTIAL METHOD BLD: NORMAL
EOSINOPHIL # BLD: 0.2 K/UL (ref 0–0.4)
EOSINOPHIL NFR BLD: 4 % (ref 0–7)
ERYTHROCYTE [DISTWIDTH] IN BLOOD BY AUTOMATED COUNT: 12.6 % (ref 11.5–14.5)
GLOBULIN SER CALC-MCNC: 3.8 G/DL (ref 2–4)
GLUCOSE SERPL-MCNC: 94 MG/DL (ref 65–100)
HCT VFR BLD AUTO: 39.1 % (ref 35–47)
HDLC SERPL-MCNC: 55 MG/DL
HDLC SERPL: 4.3 {RATIO} (ref 0–5)
HGB BLD-MCNC: 12.6 G/DL (ref 11.5–16)
IMM GRANULOCYTES # BLD AUTO: 0 K/UL (ref 0–0.04)
IMM GRANULOCYTES NFR BLD AUTO: 0 % (ref 0–0.5)
LDLC SERPL CALC-MCNC: 146.4 MG/DL (ref 0–100)
LYMPHOCYTES # BLD: 1.8 K/UL (ref 0.8–3.5)
LYMPHOCYTES NFR BLD: 29 % (ref 12–49)
MCH RBC QN AUTO: 30.2 PG (ref 26–34)
MCHC RBC AUTO-ENTMCNC: 32.2 G/DL (ref 30–36.5)
MCV RBC AUTO: 93.8 FL (ref 80–99)
MONOCYTES # BLD: 0.6 K/UL (ref 0–1)
MONOCYTES NFR BLD: 9 % (ref 5–13)
NEUTS SEG # BLD: 3.6 K/UL (ref 1.8–8)
NEUTS SEG NFR BLD: 57 % (ref 32–75)
NRBC # BLD: 0 K/UL (ref 0–0.01)
NRBC BLD-RTO: 0 PER 100 WBC
PLATELET # BLD AUTO: 235 K/UL (ref 150–400)
PMV BLD AUTO: 10.9 FL (ref 8.9–12.9)
POTASSIUM SERPL-SCNC: 3.9 MMOL/L (ref 3.5–5.1)
PROT SERPL-MCNC: 7.9 G/DL (ref 6.4–8.2)
RBC # BLD AUTO: 4.17 M/UL (ref 3.8–5.2)
SODIUM SERPL-SCNC: 136 MMOL/L (ref 136–145)
TRIGL SERPL-MCNC: 173 MG/DL (ref ?–150)
TSH SERPL DL<=0.05 MIU/L-ACNC: 0.88 UIU/ML (ref 0.36–3.74)
VLDLC SERPL CALC-MCNC: 34.6 MG/DL
WBC # BLD AUTO: 6.2 K/UL (ref 3.6–11)

## 2022-04-27 NOTE — PROGRESS NOTES
Your labs look great for thyroid, blood count and kidney and liver functions and sugar. The cholesterol is really high. We really need to keep an eye on this. Watch the diet for red meat/pork, dairy products, and fried/fast foods. Recheck 6 months fasting.  Brandon Akhtar

## 2022-05-17 RX ORDER — NIFEDIPINE 60 MG/1
TABLET, EXTENDED RELEASE ORAL
Qty: 30 TABLET | Refills: 2 | Status: SHIPPED | OUTPATIENT
Start: 2022-05-17 | End: 2022-08-16 | Stop reason: SDUPTHER

## 2022-06-06 RX ORDER — BUTALBITAL, ACETAMINOPHEN AND CAFFEINE 50; 325; 40 MG/1; MG/1; MG/1
1 TABLET ORAL
Qty: 20 TABLET | Refills: 2 | Status: SHIPPED | OUTPATIENT
Start: 2022-06-06

## 2022-06-06 RX ORDER — METOPROLOL SUCCINATE 100 MG/1
100 TABLET, EXTENDED RELEASE ORAL DAILY
Qty: 30 TABLET | Refills: 5 | Status: SHIPPED | OUTPATIENT
Start: 2022-06-06

## 2022-08-08 ENCOUNTER — VIRTUAL VISIT (OUTPATIENT)
Dept: FAMILY MEDICINE CLINIC | Age: 32
End: 2022-08-08
Payer: COMMERCIAL

## 2022-08-08 DIAGNOSIS — I10 ESSENTIAL HYPERTENSION: ICD-10-CM

## 2022-08-08 DIAGNOSIS — R63.5 WEIGHT GAIN: ICD-10-CM

## 2022-08-08 DIAGNOSIS — F41.1 GAD (GENERALIZED ANXIETY DISORDER): ICD-10-CM

## 2022-08-08 DIAGNOSIS — F31.32 BIPOLAR 1 DISORDER, DEPRESSED, MODERATE (HCC): Primary | ICD-10-CM

## 2022-08-08 DIAGNOSIS — G43.709 CHRONIC MIGRAINE WITHOUT AURA WITHOUT STATUS MIGRAINOSUS, NOT INTRACTABLE: ICD-10-CM

## 2022-08-08 PROBLEM — Z34.90 PREGNANT: Status: RESOLVED | Noted: 2019-10-16 | Resolved: 2022-08-08

## 2022-08-08 PROBLEM — Z34.90 TERM PREGNANCY: Status: RESOLVED | Noted: 2019-10-17 | Resolved: 2022-08-08

## 2022-08-08 PROCEDURE — 99214 OFFICE O/P EST MOD 30 MIN: CPT | Performed by: NURSE PRACTITIONER

## 2022-08-08 RX ORDER — SERTRALINE HYDROCHLORIDE 25 MG/1
25 TABLET, FILM COATED ORAL DAILY
Qty: 30 TABLET | Refills: 1 | Status: SHIPPED | OUTPATIENT
Start: 2022-08-08 | End: 2022-10-02

## 2022-08-08 RX ORDER — LAMOTRIGINE 25 MG/1
25 TABLET ORAL DAILY
Qty: 30 TABLET | Refills: 1 | Status: SHIPPED | OUTPATIENT
Start: 2022-08-08 | End: 2022-10-02

## 2022-08-08 NOTE — PROGRESS NOTES
Leonid Crabtree is a 28 y.o. female who was seen by synchronous (real-time) audio-video technology on 8/8/2022 for Depression, Anxiety, and Medication Evaluation (Pt stated that she would like to be on medication for Depression and Anxiety)        Assessment & Plan:   Diagnoses and all orders for this visit:    1. Bipolar 1 disorder, depressed, moderate (HCC)  Recurrence of symptoms  We discussed differences between major depressive disorder and bipolar depression, and the danger of inducing manic episodes using traditional antidepressant medications alone  Recommend restarting Lamictal, patient is agreeable with this plan  -     lamoTRIgine (LaMICtal) 25 mg tablet; Take 1 Tablet by mouth in the morning. 2. MITRA (generalized anxiety disorder)  New diagnosis  Will add small dose of sertraline to help manage anxiety  -     sertraline (ZOLOFT) 25 mg tablet; Take 1 Tablet by mouth in the morning. 3. Essential hypertension  Last documented blood pressure at goal  Continue metoprolol, nifedipine  Low-sodium diet recommended  Weight loss recommended  150 minutes of exercise weekly recommended    4. Chronic migraine without aura without status migrainosus, not intractable  Worsening frequency pattern on Emgality  Encouraged patient to schedule follow-up with neurology for further evaluation and treatment    5. Weight gain  Schedule an office visit to document weight/BMI  Encouraged exercise  Encouraged low-fat, lower carb diet  Encouraged lower sodium intake if she feels she is retaining fluid    Follow-up and Dispositions    Return in about 6 weeks (around 9/19/2022), or if symptoms worsen or fail to improve, for bipolar depression. I have discussed the diagnosis with the patient and the intended plan as seen in the above orders, and questions were answered concerning future plans. Patient conveyed understanding of the plan at the time of the visit.     Subjective:     HPI:    Presents for follow-up of bipolar disorder, hypertension, and evaluation of anxiety, weight gain. Patient reports history of bipolar depression, previously managed on Lamictal.  Review of prior office notes reveals medication was well-tolerated and symptoms were well controlled. Patient reports she stopped taking Lamictal several years ago due to pregnancy. She is now experiencing a resurgence in symptoms, feels she has not been coping well, experiencing low motivation, irritability, depressed mood. She reports some mild mario symptoms but notes her primary symptoms have been depression. She also reports some \"anxiety attacks\" over the past few weeks. She denies any major life events or changes in the time period leading up to her current symptom emergence. Denies thoughts of harming self or others. She is not currently seeing a mental health professional.  States she would like to be started on medication for anxiety and depression. Patient reports she is experiencing worsening of her headache pattern, now having daily headaches. She is currently prescribed Emgality and reports good compliance with treatment. She uses Fioricet as needed for abortive therapy. She is currently under the care of Dr. Telma Juarez for her headaches. She has not contacted him for evaluation of her changing symptom pattern. Patient is concerned about weight gain, she is not able to quantify how much weight she has gained. Also complains of \"puffiness\", feels she may be retaining fluid. She would like to consider starting weight loss medication. She is not following any particular diet plan. She exercises sporadically. Prior to Admission medications    Medication Sig Start Date End Date Taking? Authorizing Provider   sertraline (ZOLOFT) 25 mg tablet Take 1 Tablet by mouth in the morning. 8/8/22  Yes Lisy De La Cruz NP   lamoTRIgine (LaMICtal) 25 mg tablet Take 1 Tablet by mouth in the morning.  8/8/22  Yes Lisy De La Cruz NP butalbital-acetaminophen-caffeine (FIORICET, ESGIC) -40 mg per tablet TAKE 1 TABLET BY MOUTH EVERY SIX (6) HOURS AS NEEDED FOR HEADACHE. 6/6/22  Yes Isabell Gustafson MD   metoprolol succinate (TOPROL-XL) 100 mg tablet Take 1 Tablet by mouth daily. 6/6/22  Yes Edinson Albrecht NP   NIFEdipine ER (PROCARDIA XL) 60 mg ER tablet TAKE 1 TABLET BY MOUTH EVERY DAY 5/17/22  Yes Edilberto Hylton MD   ethinyl estradiol-etonogestrel (NuvaRing) 0.12-0.015 mg/24 hr vaginal ring Insert 21 days and remove for 7 days then restart with new ring 4/25/22  Yes Edinson Albrecht NP   rosuvastatin (CRESTOR) 5 mg tablet TAKE 1 TABLET BY MOUTH EVERY DAY 12/21/21  Yes Edinson Albrecht NP   ondansetron (ZOFRAN ODT) 4 mg disintegrating tablet Take 1 Tablet by mouth every eight (8) hours as needed for Nausea or Vomiting. Patient not taking: No sig reported 9/23/21   Edinson Albrecht NP   galcanezumab-gnlm (Emgality Pen) 120 mg/mL injection 1 mL by SubCUTAneous route every twenty-eight (28) days. 8/3/21  Yes Brodie Dance, MD     Patient Active Problem List   Diagnosis Code    Bipolar affective disorder ([facility] [address] F31.9    Essential hypertension I10    Allergic rhinitis J30.9    Dyslipidemia E78.5    Intractable migraine with aura with status migrainosus G43. 111     Allergies   Allergen Reactions    Sumatriptan Nausea Only, Other (comments) and Vertigo     Blurred vision & hallucinations    Ajovy Autoinjector [Fremanezumab-Vfrm] Hives     Past Medical History:   Diagnosis Date    Asthma     Cold and exercise induced    Bipolar 1 disorder (HCC)     Gestational diabetes     diet controlled    Hypertension     Migraine     Postpartum depression      History reviewed. No pertinent surgical history.   Family History   Problem Relation Age of Onset    Hypertension Mother     Other Mother         fibromyalgia    Diabetes Mother     Sleep Apnea Mother     Hypertension Father      Social History     Tobacco Use    Smoking status: Never Smokeless tobacco: Never   Substance Use Topics    Alcohol use: Yes     Alcohol/week: 5.0 standard drinks     Types: 1 Glasses of wine, 4 Shots of liquor per week     Comment:  wine daily, liquor on the weekends       Review of Systems   Constitutional: Negative. HENT: Negative. Eyes: Negative. Respiratory: Negative. Cardiovascular: Negative. Gastrointestinal: Negative. Genitourinary: Negative. Musculoskeletal: Negative. Skin: Negative. Neurological:  Positive for headaches. Endo/Heme/Allergies: Negative. Psychiatric/Behavioral:  Positive for depression. Negative for hallucinations, memory loss, substance abuse and suicidal ideas. The patient is nervous/anxious. The patient does not have insomnia. Objective:   No flowsheet data found. General: alert, cooperative, no distress   Mental  status: normal mood, behavior, speech, dress, motor activity, and thought processes, able to follow commands   HENT: NCAT   Neck: no visualized mass   Resp: no respiratory distress   Neuro: no gross deficits   Skin: no discoloration or lesions of concern on visible areas   Psychiatric: normal affect, consistent with stated mood, no evidence of hallucinations     Additional exam findings:   none    We discussed the expected course, resolution and complications of the diagnosis(es) in detail. Medication risks, benefits, costs, interactions, and alternatives were discussed as indicated. I advised her to contact the office if her condition worsens, changes or fails to improve as anticipated. She expressed understanding with the diagnosis(es) and plan. Rosy Marquez, was evaluated through a synchronous (real-time) audio-video encounter. The patient (or guardian if applicable) is aware that this is a billable service, which includes applicable co-pays. This Virtual Visit was conducted with patient's (and/or legal guardian's) consent.  The visit was conducted pursuant to the emergency declaration under the [facility], [address] [place] [address] waiver authority and the [employer] and [employer] General Act. Patient identification was verified, and a caregiver was present when appropriate.   The patient was located at: Home: [address] [place] [address] Dr [address]dom  The provider was located at: Home: [place], [place]  8/8/2022

## 2022-08-16 DIAGNOSIS — G43.111 INTRACTABLE MIGRAINE WITH AURA WITH STATUS MIGRAINOSUS: ICD-10-CM

## 2022-08-16 RX ORDER — GALCANEZUMAB 120 MG/ML
120 INJECTION, SOLUTION SUBCUTANEOUS
Qty: 1 ML | Refills: 5 | Status: CANCELLED | OUTPATIENT
Start: 2022-08-16

## 2022-08-16 RX ORDER — GALCANEZUMAB 120 MG/ML
INJECTION, SOLUTION SUBCUTANEOUS
Qty: 1 ML | Refills: 5 | Status: SHIPPED | OUTPATIENT
Start: 2022-08-16 | End: 2022-10-18 | Stop reason: SDUPTHER

## 2022-09-23 RX ORDER — NIFEDIPINE 60 MG/1
TABLET, EXTENDED RELEASE ORAL
Qty: 30 TABLET | Refills: 0 | Status: SHIPPED | OUTPATIENT
Start: 2022-09-23 | End: 2022-09-29 | Stop reason: SDUPTHER

## 2022-09-27 ENCOUNTER — TELEPHONE (OUTPATIENT)
Dept: FAMILY MEDICINE CLINIC | Age: 32
End: 2022-09-27

## 2022-09-27 NOTE — TELEPHONE ENCOUNTER
----- Message from Eulajas Goldbergmoni sent at 9/27/2022  4:12 PM EDT -----  Subject: Message to Provider    QUESTIONS  Information for Provider? Patient was requesting appt for pain, stiffness   in hands, fingers. Appt was made for 11/3/2022. She wants someting sooner   than that, if possible. Also, she stated that the [facility] pharmacy has refused   to refill the medication, NIFEdipine ER (PROCARDIA XL) 60 mg ER tablet,   and she is completely out and not taken it X 1 day. Please advise. Thank   you   ---------------------------------------------------------------------------  --------------  [employer] INFO  [phone]; OK to leave message on voicemail  ---------------------------------------------------------------------------  --------------  SCRIPT ANSWERS  Relationship to Patient?  Self

## 2022-09-28 NOTE — TELEPHONE ENCOUNTER
Received call from patient. Morales Escobar) patient states that recent RX fill was sent with no refills. Patient states understanding that refill sent 9/23 by Dr Bill Dalal:    NIFEdipine ER (PROCARDIA XL) 60 mg ER, 30 tabs with 0 refills. Patient has OV scheduled for 11/3/22 with Miguel MILES. Requesting a call back because she will run out of RX before [address] appointment. Okay to leave message as she will be in meetings part of the day tomorrow.

## 2022-09-28 NOTE — TELEPHONE ENCOUNTER
Left vm to have pt call office back, in the meantime will send to provider to see if she can add refill to hold pt until her apt

## 2022-09-29 RX ORDER — NIFEDIPINE 60 MG/1
60 TABLET, EXTENDED RELEASE ORAL DAILY
Qty: 30 TABLET | Refills: 1 | Status: SHIPPED | OUTPATIENT
Start: 2022-09-29

## 2022-10-01 DIAGNOSIS — F31.32 BIPOLAR 1 DISORDER, DEPRESSED, MODERATE (HCC): ICD-10-CM

## 2022-10-01 DIAGNOSIS — F41.1 GAD (GENERALIZED ANXIETY DISORDER): ICD-10-CM

## 2022-10-02 RX ORDER — LAMOTRIGINE 25 MG/1
TABLET ORAL
Qty: 30 TABLET | Refills: 1 | Status: SHIPPED | OUTPATIENT
Start: 2022-10-02

## 2022-10-02 RX ORDER — SERTRALINE HYDROCHLORIDE 25 MG/1
TABLET, FILM COATED ORAL
Qty: 30 TABLET | Refills: 1 | Status: SHIPPED | OUTPATIENT
Start: 2022-10-02

## 2022-10-18 ENCOUNTER — OFFICE VISIT (OUTPATIENT)
Dept: NEUROLOGY | Age: 32
End: 2022-10-18
Payer: COMMERCIAL

## 2022-10-18 VITALS — SYSTOLIC BLOOD PRESSURE: 124 MMHG | DIASTOLIC BLOOD PRESSURE: 76 MMHG

## 2022-10-18 DIAGNOSIS — G43.111 INTRACTABLE MIGRAINE WITH AURA WITH STATUS MIGRAINOSUS: ICD-10-CM

## 2022-10-18 PROCEDURE — 99213 OFFICE O/P EST LOW 20 MIN: CPT | Performed by: PSYCHIATRY & NEUROLOGY

## 2022-10-18 RX ORDER — BISMUTH SUBSALICYLATE 262 MG
1 TABLET,CHEWABLE ORAL DAILY
COMMUNITY

## 2022-10-18 RX ORDER — LORATADINE 10 MG/1
10 TABLET ORAL
COMMUNITY

## 2022-10-18 RX ORDER — GALCANEZUMAB 120 MG/ML
INJECTION, SOLUTION SUBCUTANEOUS
Qty: 1 ML | Refills: 5 | Status: SHIPPED | OUTPATIENT
Start: 2022-10-18

## 2022-10-18 NOTE — PROGRESS NOTES
[facility] and [address] at [facility] at [address] [facility], [address] [facility] [address] [facility], [address] [place] [address]   [phone]              Chief Complaint   Patient presents with    Migraine     Drastic change with injections, due now for dose. Avg about 3 headache days per mo     Current Outpatient Medications   Medication Sig Dispense Refill    loratadine (CLARITIN) 10 mg tablet Take 10 mg by mouth.      multivitamin (ONE A DAY) tablet Take 1 Tablet by mouth daily. galcanezumab-gnlm (Emgality Pen) 120 mg/mL injection INJECT 1 ML UNDER THE SKIN EVERY 28 DAYS 1 mL 5    sertraline (ZOLOFT) 25 mg tablet TAKE 1 TABLET BY MOUTH EVERY DAY IN THE MORNING 30 Tablet 1    lamoTRIgine (LaMICtal) 25 mg tablet TAKE 1 TABLET BY MOUTH EVERY DAY IN THE MORNING 30 Tablet 1    NIFEdipine ER (PROCARDIA XL) 60 mg ER tablet Take 1 Tablet by mouth daily. 30 Tablet 1    butalbital-acetaminophen-caffeine (FIORICET, ESGIC) -40 mg per tablet TAKE 1 TABLET BY MOUTH EVERY SIX (6) HOURS AS NEEDED FOR HEADACHE. 20 Tablet 2    metoprolol succinate (TOPROL-XL) 100 mg tablet Take 1 Tablet by mouth daily. 30 Tablet 5    ethinyl estradiol-etonogestrel (NuvaRing) 0.12-0.015 mg/24 hr vaginal ring Insert 21 days and remove for 7 days then restart with new ring 1 Each 11    rosuvastatin (CRESTOR) 5 mg tablet TAKE 1 TABLET BY MOUTH EVERY DAY 30 Tablet 5    ondansetron (ZOFRAN ODT) 4 mg disintegrating tablet Take 1 Tablet by mouth every eight (8) hours as needed for Nausea or Vomiting.  (Patient not taking: No sig reported) 20 Tablet 0      Allergies   Allergen Reactions    Sumatriptan Nausea Only, Other (comments) and Vertigo     Blurred vision & hallucinations    Ajovy Autoinjector [Fremanezumab-Vfrm] Hives     Social History     Tobacco Use    Smoking status: Never    Smokeless tobacco: Never   Substance Use Topics Alcohol use: Yes     Alcohol/week: 5.0 standard drinks     Types: 1 Glasses of wine, 4 Shots of liquor per week     Comment:  wine daily, liquor on the weekends    Drug use: No     28-year-old lady turns today for follow-up intractable migraine headaches. Her last visit with me was in August 2021 and she was doing well on Emgality and she uses either Tylenol or Excedrin for abortive therapy and that is effective. .  Today she reports she continues to do well. She has perhaps 3 headache days per month. No side effects. Happy with how she is doing. Examination  Visit Vitals  /76 (BP 1 Location: Left upper arm, BP Patient Position: Sitting, BP Cuff Size: Adult)     She is a very pleasant engaging lady. She is awake alert and oriented. Speech and language normal.  Cognition normal.  No ataxia. Impression/Plan  Intractable migraine headaches doing very well with Emgality  Continue Emgality  As long as she does well follow-up next year    Sunitha Summers MD        This note was created using voice recognition software. Despite editing, there may be syntax errors.

## 2022-11-16 ENCOUNTER — VIRTUAL VISIT (OUTPATIENT)
Dept: FAMILY MEDICINE CLINIC | Age: 32
End: 2022-11-16
Payer: COMMERCIAL

## 2022-11-16 DIAGNOSIS — I10 ESSENTIAL HYPERTENSION: Primary | ICD-10-CM

## 2022-11-16 PROCEDURE — 99213 OFFICE O/P EST LOW 20 MIN: CPT | Performed by: NURSE PRACTITIONER

## 2022-11-16 RX ORDER — NIFEDIPINE 60 MG/1
60 TABLET, EXTENDED RELEASE ORAL DAILY
Qty: 30 TABLET | Refills: 5 | Status: SHIPPED | OUTPATIENT
Start: 2022-11-16

## 2022-11-16 RX ORDER — METOPROLOL SUCCINATE 100 MG/1
100 TABLET, EXTENDED RELEASE ORAL DAILY
Qty: 30 TABLET | Refills: 5 | Status: SHIPPED | OUTPATIENT
Start: 2022-11-16

## 2022-11-16 NOTE — PROGRESS NOTES
Chief Complaint   Patient presents with    Medication Refill     Pt being seen for refill of bp med    1. Have you been to the ER, urgent care clinic since your last visit? Hospitalized since your last visit? No    2. Have you seen or consulted any other health care providers outside of the [address] [place] [address] since your last visit? Include any pap smears or colon screening.  No    Pt has no other concerns

## 2022-11-17 NOTE — PROGRESS NOTES
Michael Roy (: 1990) is a 28 y.o. female, established patient, here for evaluation of the following chief complaint(s):   Medication Refill       ASSESSMENT/PLAN:  Below is the assessment and plan developed based on review of pertinent history, labs, studies, and medications. Diagnoses and all orders for this visit:    1. Essential hypertension    Other orders  -     metoprolol succinate (TOPROL-XL) 100 mg tablet; Take 1 Tablet by mouth daily.  -     NIFEdipine ER (PROCARDIA XL) 60 mg ER tablet; Take 1 Tablet by mouth daily.     Refills updated  Encouraged to make appt for labs    SUBJECTIVE/OBJECTIVE:  HPI  She is due for follow up bp and refills  No adr/se of meds  No bp checks on her own    Review of Systems   A comprehensive review of system was obtained and negative except findings in the HPI    No data recorded     Physical Exam    [INSTRUCTIONS:  \"[x]\" Indicates a positive item  \"[]\" Indicates a negative item  -- DELETE ALL ITEMS NOT EXAMINED]    Constitutional: [x] Appears well-developed and well-nourished [x] No apparent distress      [] Abnormal -     Mental status: [x] Alert and awake  [x] Oriented to person/place/time [x] Able to follow commands    [] Abnormal -     Eyes:   EOM    [x]  Normal    [] Abnormal -   Sclera  [x]  Normal    [] Abnormal -          Discharge [x]  None visible   [] Abnormal -     HENT: [x] Normocephalic, atraumatic  [] Abnormal -   [x] Mouth/Throat: Mucous membranes are moist    External Ears [x] Normal  [] Abnormal -    Neck: [x] No visualized mass [] Abnormal -     Pulmonary/Chest: [x] Respiratory effort normal   [x] No visualized signs of difficulty breathing or respiratory distress        [] Abnormal -      Musculoskeletal:   [x] Normal gait with no signs of ataxia         [x] Normal range of motion of neck        [] Abnormal -     Neurological:        [x] No Facial Asymmetry (Cranial nerve 7 motor function) (limited exam due to video visit)          [x] No gaze palsy        [] Abnormal -          Skin:        [x] No significant exanthematous lesions or discoloration noted on facial skin         [] Abnormal -            Psychiatric:       [x] Normal Affect [] Abnormal -        [x] No Hallucinations    Other pertinent observable physical exam findings:-    On this date 11/16/2022 I have spent 15 minutes reviewing previous notes, test results and face to face (virtual) with the patient discussing the diagnosis and importance of compliance with the treatment plan as well as documenting on the day of the visit. Barrett Aram was evaluated through a synchronous (real-time) audio-video encounter. The patient (or guardian if applicable) is aware that this is a billable service, which includes applicable co-pays. This Virtual Visit was conducted with patient's (and/or legal guardian's) consent. The visit was conducted pursuant to the emergency declaration under the [facility], [address] [place] [address] authority and the [email or web address] and [employer] General Act. Patient identification was verified, and a caregiver was present when appropriate. The patient was located at: Home: [address] [facility]  The provider was located at: Home: [unfilled]       An electronic signature was used to authenticate this note.   -- Shane Palacio NP

## 2022-11-24 DIAGNOSIS — F41.1 GAD (GENERALIZED ANXIETY DISORDER): ICD-10-CM

## 2022-11-24 DIAGNOSIS — F31.32 BIPOLAR 1 DISORDER, DEPRESSED, MODERATE (HCC): ICD-10-CM

## 2022-11-26 RX ORDER — LAMOTRIGINE 25 MG/1
TABLET ORAL
Qty: 30 TABLET | Refills: 1 | Status: SHIPPED | OUTPATIENT
Start: 2022-11-26

## 2022-11-26 RX ORDER — SERTRALINE HYDROCHLORIDE 25 MG/1
TABLET, FILM COATED ORAL
Qty: 30 TABLET | Refills: 1 | Status: SHIPPED | OUTPATIENT
Start: 2022-11-26

## 2022-12-05 ENCOUNTER — DOCUMENTATION ONLY (OUTPATIENT)
Dept: FAMILY MEDICINE CLINIC | Age: 32
End: 2022-12-05

## 2022-12-05 NOTE — PROGRESS NOTES
Called pt in regards to her mychart apt request for Medication change and Labs. LVM to call us back.

## 2023-01-09 ENCOUNTER — OFFICE VISIT (OUTPATIENT)
Dept: FAMILY MEDICINE CLINIC | Age: 33
End: 2023-01-09
Payer: COMMERCIAL

## 2023-01-09 VITALS
HEART RATE: 75 BPM | WEIGHT: 195 LBS | RESPIRATION RATE: 20 BRPM | SYSTOLIC BLOOD PRESSURE: 121 MMHG | DIASTOLIC BLOOD PRESSURE: 84 MMHG | OXYGEN SATURATION: 98 % | TEMPERATURE: 98.3 F | BODY MASS INDEX: 31.34 KG/M2 | HEIGHT: 66 IN

## 2023-01-09 DIAGNOSIS — I10 ESSENTIAL HYPERTENSION: Primary | ICD-10-CM

## 2023-01-09 DIAGNOSIS — R73.01 IMPAIRED FASTING BLOOD SUGAR: ICD-10-CM

## 2023-01-09 DIAGNOSIS — E78.00 HYPERCHOLESTEROLEMIA: ICD-10-CM

## 2023-01-09 DIAGNOSIS — F31.32 BIPOLAR 1 DISORDER, DEPRESSED, MODERATE (HCC): ICD-10-CM

## 2023-01-09 LAB
ALBUMIN SERPL-MCNC: 4.5 G/DL (ref 3.5–5)
ALBUMIN/GLOB SERPL: 1.2 (ref 1.1–2.2)
ALP SERPL-CCNC: 56 U/L (ref 45–117)
ALT SERPL-CCNC: 67 U/L (ref 12–78)
ANION GAP SERPL CALC-SCNC: 4 MMOL/L (ref 5–15)
AST SERPL-CCNC: 32 U/L (ref 15–37)
BASOPHILS # BLD: 0 K/UL (ref 0–0.1)
BASOPHILS NFR BLD: 1 % (ref 0–1)
BILIRUB SERPL-MCNC: 0.7 MG/DL (ref 0.2–1)
BUN SERPL-MCNC: 14 MG/DL (ref 6–20)
BUN/CREAT SERPL: 11 (ref 12–20)
CALCIUM SERPL-MCNC: 9.4 MG/DL (ref 8.5–10.1)
CHLORIDE SERPL-SCNC: 104 MMOL/L (ref 97–108)
CHOLEST SERPL-MCNC: 166 MG/DL
CO2 SERPL-SCNC: 29 MMOL/L (ref 21–32)
CREAT SERPL-MCNC: 1.29 MG/DL (ref 0.55–1.02)
DIFFERENTIAL METHOD BLD: NORMAL
EOSINOPHIL # BLD: 0.1 K/UL (ref 0–0.4)
EOSINOPHIL NFR BLD: 4 % (ref 0–7)
ERYTHROCYTE [DISTWIDTH] IN BLOOD BY AUTOMATED COUNT: 12.3 % (ref 11.5–14.5)
EST. AVERAGE GLUCOSE BLD GHB EST-MCNC: 108 MG/DL
GLOBULIN SER CALC-MCNC: 3.8 G/DL (ref 2–4)
GLUCOSE SERPL-MCNC: 94 MG/DL (ref 65–100)
HBA1C MFR BLD: 5.4 % (ref 4–5.6)
HCT VFR BLD AUTO: 42 % (ref 35–47)
HDLC SERPL-MCNC: 45 MG/DL
HDLC SERPL: 3.7 (ref 0–5)
HGB BLD-MCNC: 13.5 G/DL (ref 11.5–16)
IMM GRANULOCYTES # BLD AUTO: 0 K/UL (ref 0–0.04)
IMM GRANULOCYTES NFR BLD AUTO: 0 % (ref 0–0.5)
LDLC SERPL CALC-MCNC: 92 MG/DL (ref 0–100)
LYMPHOCYTES # BLD: 1.5 K/UL (ref 0.8–3.5)
LYMPHOCYTES NFR BLD: 38 % (ref 12–49)
MCH RBC QN AUTO: 29.4 PG (ref 26–34)
MCHC RBC AUTO-ENTMCNC: 32.1 G/DL (ref 30–36.5)
MCV RBC AUTO: 91.5 FL (ref 80–99)
MONOCYTES # BLD: 0.4 K/UL (ref 0–1)
MONOCYTES NFR BLD: 10 % (ref 5–13)
NEUTS SEG # BLD: 1.9 K/UL (ref 1.8–8)
NEUTS SEG NFR BLD: 47 % (ref 32–75)
NRBC # BLD: 0 K/UL (ref 0–0.01)
NRBC BLD-RTO: 0 PER 100 WBC
PLATELET # BLD AUTO: 268 K/UL (ref 150–400)
PMV BLD AUTO: 11.1 FL (ref 8.9–12.9)
POTASSIUM SERPL-SCNC: 4.5 MMOL/L (ref 3.5–5.1)
PROT SERPL-MCNC: 8.3 G/DL (ref 6.4–8.2)
RBC # BLD AUTO: 4.59 M/UL (ref 3.8–5.2)
SODIUM SERPL-SCNC: 137 MMOL/L (ref 136–145)
TRIGL SERPL-MCNC: 145 MG/DL (ref ?–150)
VLDLC SERPL CALC-MCNC: 29 MG/DL
WBC # BLD AUTO: 3.9 K/UL (ref 3.6–11)

## 2023-01-09 PROCEDURE — 3074F SYST BP LT 130 MM HG: CPT | Performed by: NURSE PRACTITIONER

## 2023-01-09 PROCEDURE — 3079F DIAST BP 80-89 MM HG: CPT | Performed by: NURSE PRACTITIONER

## 2023-01-09 PROCEDURE — 99214 OFFICE O/P EST MOD 30 MIN: CPT | Performed by: NURSE PRACTITIONER

## 2023-01-09 RX ORDER — ROSUVASTATIN CALCIUM 5 MG/1
TABLET, COATED ORAL
Qty: 30 TABLET | Refills: 5 | Status: SHIPPED | OUTPATIENT
Start: 2023-01-09

## 2023-01-09 RX ORDER — DULOXETIN HYDROCHLORIDE 60 MG/1
60 CAPSULE, DELAYED RELEASE ORAL DAILY
Qty: 30 CAPSULE | Refills: 5 | Status: SHIPPED | OUTPATIENT
Start: 2023-01-09

## 2023-01-09 NOTE — PROGRESS NOTES
Chief Complaint   Patient presents with    Medication Evaluation    Labs     Pt being seen for med eval  -pt also wants to discuss her weight     Labs    Pt has no other concerns

## 2023-01-09 NOTE — PROGRESS NOTES
HISTORY OF PRESENT ILLNESS  Soledad Brennan is a 28 y.o. female. HPI  Cardiovascular Review:  The patient has hypertension and hyperlipidemia. Diet and Lifestyle: generally follows a low fat low cholesterol diet, generally follows a low sodium diet, sedentary, nonsmoker  Home BP Monitoring: is not measured at home. Pertinent ROS: taking medications as instructed, no medication side effects noted, no TIA's, no chest pain on exertion, no dyspnea on exertion, no swelling of ankles. Impaired fasting bs: Has fh and now 5 yo son with DMI, used his meter and saw reading over 200, not sure if she has eaten at the time  Depression:  Does not feel the zoloft is helping the mood for depression; has been on it several years    Patient Active Problem List    Diagnosis Date Noted    Dyslipidemia 01/04/2021    Intractable migraine with aura with status migrainosus 01/04/2021    Allergic rhinitis 09/18/2018    Essential hypertension 10/03/2017    Bipolar affective disorder ([facility] [address] 09/08/2017     Current Outpatient Medications   Medication Sig Dispense Refill    DULoxetine (CYMBALTA) 60 mg capsule Take 1 Capsule by mouth daily. 30 Capsule 5    lamoTRIgine (LaMICtal) 25 mg tablet TAKE 1 TABLET BY MOUTH EVERY DAY IN THE MORNING 30 Tablet 1    metoprolol succinate (TOPROL-XL) 100 mg tablet Take 1 Tablet by mouth daily. 30 Tablet 5    NIFEdipine ER (PROCARDIA XL) 60 mg ER tablet Take 1 Tablet by mouth daily. 30 Tablet 5    loratadine (CLARITIN) 10 mg tablet Take 10 mg by mouth.      multivitamin (ONE A DAY) tablet Take 1 Tablet by mouth daily. galcanezumab-gnlm (Emgality Pen) 120 mg/mL injection INJECT 1 ML UNDER THE SKIN EVERY 28 DAYS 1 mL 5    butalbital-acetaminophen-caffeine (FIORICET, ESGIC) -40 mg per tablet TAKE 1 TABLET BY MOUTH EVERY SIX (6) HOURS AS NEEDED FOR HEADACHE.  20 Tablet 2    ethinyl estradiol-etonogestrel (NuvaRing) 0.12-0.015 mg/24 hr vaginal ring Insert 21 days and remove for 7 days then restart with new ring 1 Each 11    rosuvastatin (CRESTOR) 5 mg tablet TAKE 1 TABLET BY MOUTH EVERY DAY 30 Tablet 5    ondansetron (ZOFRAN ODT) 4 mg disintegrating tablet Take 1 Tablet by mouth every eight (8) hours as needed for Nausea or Vomiting. (Patient not taking: No sig reported) 20 Tablet 0     Family History   Problem Relation Age of Onset    Hypertension Mother     Other Mother         fibromyalgia    Diabetes Mother     Sleep Apnea Mother     Hypertension Father      Social History     Tobacco Use    Smoking status: Never    Smokeless tobacco: Never   Substance Use Topics    Alcohol use: Yes     Alcohol/week: 5.0 standard drinks     Types: 1 Glasses of wine, 4 Shots of liquor per week     Comment:  wine daily, liquor on the weekends           ROS  A comprehensive review of system was obtained and negative except findings in the HPI    Visit Vitals  /84 (BP 1 Location: Right arm, BP Patient Position: Sitting)   Pulse 75   Temp 98.3 °F (36.8 °C) (Oral)   Resp 20   Ht 5' 6\" (1.676 m)   Wt 195 lb (88.5 kg)   LMP 12/05/2022   SpO2 98%   BMI 31.47 kg/m²     Physical Exam  Vitals and nursing note reviewed. Constitutional:       Appearance: Normal appearance. She is well-developed. Comments:      Neck:      Vascular: No JVD. Cardiovascular:      Rate and Rhythm: Normal rate and regular rhythm. Heart sounds: Normal heart sounds. No murmur heard. No friction rub. No gallop. Pulmonary:      Effort: Pulmonary effort is normal. No respiratory distress. Breath sounds: Normal breath sounds. No wheezing. Skin:     General: Skin is warm. Neurological:      Mental Status: She is alert and oriented to person, place, and time. ASSESSMENT and PLAN  Encounter Diagnoses   Name Primary?     Essential hypertension Yes    Impaired fasting blood sugar     Hypercholesterolemia     Bipolar 1 disorder, depressed, moderate ([facility] [address]      Orders Placed This Encounter    HEMOGLOBIN A1C WITH EAG    LIPID PANEL    CBC WITH AUTOMATED DIFF    METABOLIC PANEL, COMPREHENSIVE    DULoxetine (CYMBALTA) 60 mg capsule     Changed zoloft to cymbalta  Labs updated  Dev plan with results  Recheck med in 3 weeks for progress    I have discussed the diagnosis with the patient and the intended plan as seen in the above orders. The patient has received an after-visit summary and questions were answered concerning future plans. Patient conveyed understanding of the plan at the time of the visit.     Jeramie Kim, MSN, ANP  1/9/2023

## 2023-01-10 NOTE — PROGRESS NOTES
Hey there, your labs look great overall but the kidney functions are slightly high. I want to recheck this next month when you come in to see that they are improving. Make sure you are getting plenty of fluids and water.  Tawny Pretty

## 2023-02-06 ENCOUNTER — OFFICE VISIT (OUTPATIENT)
Dept: FAMILY MEDICINE CLINIC | Age: 33
End: 2023-02-06
Payer: COMMERCIAL

## 2023-02-06 VITALS
RESPIRATION RATE: 20 BRPM | SYSTOLIC BLOOD PRESSURE: 127 MMHG | HEIGHT: 66 IN | OXYGEN SATURATION: 98 % | BODY MASS INDEX: 31.34 KG/M2 | HEART RATE: 77 BPM | WEIGHT: 195 LBS | TEMPERATURE: 98.2 F | DIASTOLIC BLOOD PRESSURE: 86 MMHG

## 2023-02-06 DIAGNOSIS — F31.32 BIPOLAR 1 DISORDER, DEPRESSED, MODERATE (HCC): ICD-10-CM

## 2023-02-06 PROCEDURE — 3079F DIAST BP 80-89 MM HG: CPT | Performed by: NURSE PRACTITIONER

## 2023-02-06 PROCEDURE — 99214 OFFICE O/P EST MOD 30 MIN: CPT | Performed by: NURSE PRACTITIONER

## 2023-02-06 PROCEDURE — 3074F SYST BP LT 130 MM HG: CPT | Performed by: NURSE PRACTITIONER

## 2023-02-06 RX ORDER — LAMOTRIGINE 25 MG/1
25 TABLET ORAL 2 TIMES DAILY
Qty: 60 TABLET | Refills: 5 | Status: SHIPPED | OUTPATIENT
Start: 2023-02-06

## 2023-02-06 NOTE — PROGRESS NOTES
Chief Complaint   Patient presents with    Follow-up    Medication Evaluation     Pt being seen for fuv  Med eval   Pt states that the med is not working  -pt states that she feels the same     1. Have you been to the ER, urgent care clinic since your last visit? Hospitalized since your last visit? No    2. Have you seen or consulted any other health care providers outside of the [address] [place] [address] since your last visit? Include any pap smears or colon screening.  No    Pt has no other concerns

## 2023-02-06 NOTE — PROGRESS NOTES
HISTORY OF PRESENT ILLNESS  Fatimah Yoo is a 28 y.o. female. HPI  Changed zoloft to cymbalta last visit at 60mg a day  Still on lamictal 25mg in the am  Had severe depressive episode last week  Not seeing much improvement    ROS  A comprehensive review of system was obtained and negative except findings in the HPI    Visit Vitals  /86 (BP 1 Location: Left upper arm, BP Patient Position: Sitting)   Pulse 77   Temp 98.2 °F (36.8 °C) (Oral)   Resp 20   Ht 5' 6\" (1.676 m)   Wt 195 lb (88.5 kg)   SpO2 98%   BMI 31.47 kg/m²     Physical Exam  Vitals and nursing note reviewed. Constitutional:       Appearance: Normal appearance. She is well-developed. Comments:      Neck:      Vascular: No JVD. Cardiovascular:      Rate and Rhythm: Normal rate and regular rhythm. Heart sounds: Normal heart sounds. No murmur heard. No friction rub. No gallop. Pulmonary:      Effort: Pulmonary effort is normal. No respiratory distress. Breath sounds: Normal breath sounds. No wheezing. Skin:     General: Skin is warm. Neurological:      Mental Status: She is alert and oriented to person, place, and time. ASSESSMENT and PLAN  Encounter Diagnoses   Name Primary? Bipolar 1 disorder, depressed, moderate ([facility] [address]      Orders Placed This Encounter    lamoTRIgine (LaMICtal) 25 mg tablet     Given increased does of lamictal to 25mg bid and can continue to taper up if needed  Will do follow up next week by terrie with progress  Suicide contract made to call if needed  I have discussed the diagnosis with the patient and the intended plan as seen in the above orders. The patient has received an after-visit summary and questions were answered concerning future plans. Patient conveyed understanding of the plan at the time of the visit.     Clayton Skinner, PJ, ANP  2/6/2023

## 2023-02-20 DIAGNOSIS — F31.32 BIPOLAR 1 DISORDER, DEPRESSED, MODERATE (HCC): ICD-10-CM

## 2023-02-20 RX ORDER — LAMOTRIGINE 25 MG/1
75 TABLET ORAL DAILY
Qty: 60 TABLET | Refills: 5 | Status: SHIPPED | OUTPATIENT
Start: 2023-02-20

## 2023-02-28 ENCOUNTER — TELEPHONE (OUTPATIENT)
Dept: FAMILY MEDICINE CLINIC | Age: 33
End: 2023-02-28

## 2023-03-02 ENCOUNTER — TELEPHONE (OUTPATIENT)
Dept: FAMILY MEDICINE CLINIC | Age: 33
End: 2023-03-02

## 2023-04-17 ENCOUNTER — OFFICE VISIT (OUTPATIENT)
Dept: FAMILY MEDICINE CLINIC | Age: 33
End: 2023-04-17

## 2023-04-17 VITALS
WEIGHT: 205 LBS | BODY MASS INDEX: 32.95 KG/M2 | DIASTOLIC BLOOD PRESSURE: 111 MMHG | SYSTOLIC BLOOD PRESSURE: 158 MMHG | HEIGHT: 66 IN | OXYGEN SATURATION: 97 % | HEART RATE: 86 BPM | RESPIRATION RATE: 20 BRPM | TEMPERATURE: 98.3 F

## 2023-04-17 DIAGNOSIS — I10 ESSENTIAL HYPERTENSION: ICD-10-CM

## 2023-04-17 DIAGNOSIS — R10.2 PELVIC PAIN: Primary | ICD-10-CM

## 2023-04-17 LAB
BILIRUB UR QL STRIP: NEGATIVE
GLUCOSE UR-MCNC: NEGATIVE MG/DL
KETONES P FAST UR STRIP-MCNC: NEGATIVE MG/DL
PH UR STRIP: 6 [PH] (ref 4.6–8)
PROT UR QL STRIP: NORMAL
SP GR UR STRIP: 1.03 (ref 1–1.03)
UA UROBILINOGEN AMB POC: NORMAL (ref 0.2–1)
URINALYSIS CLARITY POC: CLEAR
URINALYSIS COLOR POC: YELLOW
URINE BLOOD POC: NEGATIVE
URINE LEUKOCYTES POC: NEGATIVE
URINE NITRITES POC: NEGATIVE

## 2023-04-17 NOTE — PROGRESS NOTES
Chief Complaint   Patient presents with    Abdominal Pain     Pt presents in office for abdominal pain  -pt states that the pain started in march  -pt states that she has been having menstrual type cramps  -pt states that they are a dull ache  -pt does that she has been having white discharge all day   -pt wants to have a blood test to test for pregnancy     Pt also presents with elevated bp w/headache  -pt states that she has not taken her bp meds    1. Have you been to the ER, urgent care clinic since your last visit? Hospitalized since your last visit? No    2. Have you seen or consulted any other health care providers outside of the [address] [place] [address] since your last visit? Include any pap smears or colon screening.  No    Pt has no other concerns

## 2023-04-21 ENCOUNTER — TELEPHONE (OUTPATIENT)
Dept: OBGYN CLINIC | Age: 33
End: 2023-04-21

## 2023-04-21 NOTE — TELEPHONE ENCOUNTER
4/21/2023-Pt called regarding an appt for a new patient visit. Explained to patient, there are no available appts for a new patient visits at this time. Informed pt, June schedule has not been posted yet. ww

## 2023-04-21 NOTE — TELEPHONE ENCOUNTER
Patient calling for a new patient appointment patient states she was transferred to get in sooner with [facility] but she states she does not remember  and she wanted to see another provider. Scheduled her with  . She has abdominal pain that has lasted since  her period of 04/08/2023 dull aches and nausea.  She had a negative pregnancy test.

## 2023-04-24 ENCOUNTER — OFFICE VISIT (OUTPATIENT)
Dept: OBGYN CLINIC | Age: 33
End: 2023-04-24
Payer: COMMERCIAL

## 2023-04-24 VITALS — WEIGHT: 208 LBS | SYSTOLIC BLOOD PRESSURE: 152 MMHG | BODY MASS INDEX: 33.57 KG/M2 | DIASTOLIC BLOOD PRESSURE: 102 MMHG

## 2023-04-24 DIAGNOSIS — R14.0 ABDOMINAL BLOATING: ICD-10-CM

## 2023-04-24 DIAGNOSIS — N89.8 VAGINAL DISCHARGE: ICD-10-CM

## 2023-04-24 DIAGNOSIS — R10.2 ADNEXAL TENDERNESS, RIGHT: ICD-10-CM

## 2023-04-24 DIAGNOSIS — R10.2 PELVIC PAIN: Primary | ICD-10-CM

## 2023-04-24 DIAGNOSIS — M54.50 ACUTE MIDLINE LOW BACK PAIN WITHOUT SCIATICA: ICD-10-CM

## 2023-04-24 PROCEDURE — 99204 OFFICE O/P NEW MOD 45 MIN: CPT | Performed by: OBSTETRICS & GYNECOLOGY

## 2023-04-24 PROCEDURE — 3077F SYST BP >= 140 MM HG: CPT | Performed by: OBSTETRICS & GYNECOLOGY

## 2023-04-24 PROCEDURE — 3080F DIAST BP >= 90 MM HG: CPT | Performed by: OBSTETRICS & GYNECOLOGY

## 2023-04-24 NOTE — PROGRESS NOTES
Problem Visit    Merritt Zuluaga is a 28 y.o. presenting for problem visit. Her main concern today is constant pelvic discomfort that onset in March. She states the discomfort is comparable to menstrual cramps with associated lower back pain. No exacerbating or alleviating factors. No worsening with sexual intercourse. She also endorses associated nausea, no vomiting, and abdominal bloating. She has been having abdominal bloating for greater than 6 months. She has no h/o irregular menses. However, her most menstrual cycle was 1 week late. No current contraceptive use x4 months (was previously using Nuvaring). Patient does not care for birth control. She does not want birth control at this time. She also reports a constant white, odorless discharge. She was seen by PCP  for her symptoms with reported negative pregnancy test and no uti. Ob/Gyn Hx:    LMP- 4/03/2023 x5 days  Menses- regular, heavy  Contraception- none   SA- yes,         Past Medical History:   Diagnosis Date    Asthma     Cold and exercise induced    Bipolar 1 disorder ([facility] [address]     Gestational diabetes     diet controlled    Hypertension     Migraine     Nausea     Postpartum depression        History reviewed. No pertinent surgical history. Family History   Problem Relation Age of Onset    Hypertension Mother     Other Mother         fibromyalgia    Diabetes Mother     Sleep Apnea Mother     Hypertension Father        Social History     Socioeconomic History    Marital status:      Spouse name: Not on file    Number of children: Not on file    Years of education: Not on file    Highest education level: Not on file   Occupational History    Not on file   Tobacco Use    Smoking status: Never    Smokeless tobacco: Never   Substance and Sexual Activity    Alcohol use:  Yes     Alcohol/week: 5.0 standard drinks     Types: 1 Glasses of wine, 4 Shots of liquor per week     Comment:  wine daily, liquor on the weekends Drug use: No    Sexual activity: Yes     Partners: Male     Birth control/protection: Pill   Other Topics Concern     Service Not Asked    Blood Transfusions Not Asked    Caffeine Concern Not Asked    Occupational Exposure Not Asked    Hobby Hazards Not Asked    Sleep Concern Not Asked    Stress Concern Not Asked    Weight Concern Not Asked    Special Diet Not Asked    Back Care Not Asked    Exercise Not Asked    Bike Helmet Not Asked    Seat Belt Not Asked    Self-Exams Not Asked   Social History Narrative    Not on file     Social Determinants of Health     Financial Resource Strain: Not on file   Food Insecurity: Not on file   Transportation Needs: Not on file   Physical Activity: Not on file   Stress: Not on file   Social Connections: Not on file   Intimate Partner Violence: Not on file   Housing Stability: Not on file       Current Outpatient Medications   Medication Sig Dispense Refill    lamoTRIgine (LaMICtal) 25 mg tablet Take 3 Tablets by mouth daily. 60 Tablet 5    DULoxetine (CYMBALTA) 60 mg capsule Take 1 Capsule by mouth daily. 30 Capsule 5    rosuvastatin (CRESTOR) 5 mg tablet TAKE 1 TABLET BY MOUTH EVERY DAY 30 Tablet 5    metoprolol succinate (TOPROL-XL) 100 mg tablet Take 1 Tablet by mouth daily. 30 Tablet 5    NIFEdipine ER (PROCARDIA XL) 60 mg ER tablet Take 1 Tablet by mouth daily. 30 Tablet 5    loratadine (CLARITIN) 10 mg tablet Take 1 Tablet by mouth.      multivitamin (ONE A DAY) tablet Take 1 Tablet by mouth daily.       ethinyl estradiol-etonogestrel (NuvaRing) 0.12-0.015 mg/24 hr vaginal ring Insert 21 days and remove for 7 days then restart with new ring 1 Each 11       Allergies   Allergen Reactions    Sumatriptan Nausea Only, Other (comments) and Vertigo     Blurred vision & hallucinations    Ajovy Autoinjector [Fremanezumab-Vfrm] Hives       Review of Systems - History obtained from the patient, Review of System is negative except as otherwise noted in the HPI      Physical Exam    Visit Vitals  Wt 208 lb (94.3 kg)   LMP 2023 (Exact Date)   BMI 33.57 kg/m²       OBGyn Exam    Constitutional  Appearance: well-nourished, well developed, alert, in no acute distress    HENT  Head and Face: appears normal    Chest  Respiratory Effort: non-labored breathing    Cardiovascular  Heart:  Extremities: no peripheral edema    Gastrointestinal  Abdominal Examination: abdomen non-tender to palpation, non-distended  Genitourinary  External Genitalia: normal appearance for age, white, non-odorous, discharge present, right adnexal tenderness present, no inflammatory lesions present, no masses present, no atrophy present  Vagina: normal vaginal vault without central or paravaginal defects, no discharge present, no inflammatory lesions present, no masses present  Bladder: non-tender to palpation  Urethra: appears normal  Cervix: normal              Uterus: normal size, shape and consistency  Adnexa: no adnexal tenderness present, no adnexal masses present  Perineum: perineum within normal limits, no evidence of trauma, no rashes or skin lesions present. Neurologic/Psychiatric  Mental Status:  Orientation: grossly oriented to person, place and time  Mood and Affect: mood normal, affect appropriate      Assessment/Plan:  Sulema Piedra IS A 28 y.o.    1. Pelvic pain  -Present since March. Cramping in nature. Constant.  -We discussed the differential diagnosis to her pelvic pain in detail.  -An ultrasound was ordered to rule out structural causes  -Urinalysis reviewed. Impression: No evidence of urinary tract infection.  -A new swab plus was collected today to rule out infection.  -We will rule out ectopic and/or pregnancy with beta hCG.  - BETA HCG, QT  - NUSWAB VAGINITIS PLUS    2. Vaginal discharge  - NUSWAB VAGINITIS PLUS    3. Acute midline low back pain without sciatica  -New onset with pelvic cramping. 4. Abdominal bloating  -Has been present for greater than 6 months. Patient denies associated GI disturbances. 5. Adnexal tenderness, right      45+ minutes was spent with the patient including face-to-face interaction, physical exam, collection of history and chart review. Patient will return to office   Follow-up and Dispositions    Return for ASAP for US and for pelvic pain and US. Susan Ludwig MD

## 2023-04-25 LAB — HCG SERPL-ACNC: <1 MIU/ML (ref 0–6)

## 2023-04-26 LAB
A VAGINAE DNA VAG QL NAA+PROBE: NORMAL SCORE
BVAB2 DNA VAG QL NAA+PROBE: NORMAL SCORE
C ALBICANS DNA VAG QL NAA+PROBE: NEGATIVE
C GLABRATA DNA VAG QL NAA+PROBE: NEGATIVE
C TRACH DNA VAG QL NAA+PROBE: NEGATIVE
MEGA1 DNA VAG QL NAA+PROBE: NORMAL SCORE
N GONORRHOEA DNA VAG QL NAA+PROBE: NEGATIVE
T VAGINALIS DNA VAG QL NAA+PROBE: NEGATIVE

## 2023-05-08 ENCOUNTER — OFFICE VISIT (OUTPATIENT)
Age: 33
End: 2023-05-08
Payer: COMMERCIAL

## 2023-05-08 VITALS — WEIGHT: 206 LBS | DIASTOLIC BLOOD PRESSURE: 100 MMHG | SYSTOLIC BLOOD PRESSURE: 152 MMHG | BODY MASS INDEX: 33.25 KG/M2

## 2023-05-08 DIAGNOSIS — E66.09 CLASS 1 OBESITY DUE TO EXCESS CALORIES WITHOUT SERIOUS COMORBIDITY WITH BODY MASS INDEX (BMI) OF 33.0 TO 33.9 IN ADULT: ICD-10-CM

## 2023-05-08 DIAGNOSIS — R10.2 PELVIC PAIN: Primary | ICD-10-CM

## 2023-05-08 DIAGNOSIS — E28.2 PCOS (POLYCYSTIC OVARIAN SYNDROME): ICD-10-CM

## 2023-05-08 DIAGNOSIS — L68.0 HIRSUTISM: ICD-10-CM

## 2023-05-08 PROCEDURE — 3077F SYST BP >= 140 MM HG: CPT | Performed by: OBSTETRICS & GYNECOLOGY

## 2023-05-08 PROCEDURE — 99214 OFFICE O/P EST MOD 30 MIN: CPT | Performed by: OBSTETRICS & GYNECOLOGY

## 2023-05-08 PROCEDURE — 3080F DIAST BP >= 90 MM HG: CPT | Performed by: OBSTETRICS & GYNECOLOGY

## 2023-05-08 NOTE — PROGRESS NOTES
Problem Visit    Jodi Herrera is a 28 y.o. presenting for problem visit. Her main concern today is constant pelvic discomfort that onset in March. Hirsutism that has been going on for years. She continues to endorse pelvic pain since last office visit. No exacerbating or alleviating  factors. No worsening with sexual intercourse. She also endorses associated nausea, no vomiting, and abdominal bloating. She had pelvic ultrasound today for further evaluation. Menses is lighter this cycle. TVUS today shows the following -   THE UTERUS IS ANTEVERTED, NORMAL IN SIZE, AND  N ECHOGENICITY. THE ENDOMETRIUM MEASURES 5.54 MM IN THICKNESS. NO MASSES OR ABNORMALITIES ARE SEEN. RIGHT OVARY APPEARS WNL. LEFT OVARY APPEARS TO HAVE 42+ FOLLICLES SURROUNDING THE PERIPHERY OF THE OVARY. THERE  APPEARS TO BE FREE FLUID ADJACENT TO THE LEFT OVARY MEASURING 25 X 10 MM. NO FREE FLUID IS SEEN IN THE CDS. Ob/Gyn Hx:      LMP-  2023   Menses- regular, heavy   Contraception- none    SA- yes,         Past Medical History:   Diagnosis Date    Asthma     Cold and exercise induced    Bipolar 1 disorder ([facility] [address]     Gestational diabetes     diet controlled    High cholesterol     Hypertension     Migraine     Postpartum depression        No past surgical history on file. Family History   Problem Relation Age of Onset    Hypertension Father     Diabetes Mother     Other Mother         fibromyalgia    Hypertension Mother     Sleep Apnea Mother        Social History     Socioeconomic History    Marital status:      Spouse name: Not on file    Number of children: Not on file    Years of education: Not on file    Highest education level: Not on file   Occupational History    Not on file   Tobacco Use    Smoking status: Never    Smokeless tobacco: Never   Substance and Sexual Activity    Alcohol use:  Yes     Alcohol/week: 5.0 standard drinks    Drug use: No    Sexual activity: Not on file   Other Topics

## 2023-05-15 ENCOUNTER — TELEPHONE (OUTPATIENT)
Age: 33
End: 2023-05-15

## 2023-05-16 RX ORDER — ETONOGESTREL AND ETHINYL ESTRADIOL 11.7; 2.7 MG/1; MG/1
INSERT, EXTENDED RELEASE VAGINAL
Qty: 1 EACH | Refills: 11 | Status: SHIPPED | OUTPATIENT
Start: 2023-05-16

## 2023-05-23 NOTE — TELEPHONE ENCOUNTER
RE:Emgality  Key: [id number]        Rcvd PA request added and submitted via [facility] awaiting update

## 2023-05-23 NOTE — TELEPHONE ENCOUNTER
RE:Emgality  Key: [id number]      Rcvd notification via [facility] that medication has been approved     PA Case: [id number], Status: Approved, Coverage Starts on: 5/23/2023 12:00:00 AM, Coverage Ends on: 5/22/2024 12:00:00 AM.      Nurse notified

## 2023-06-30 ENCOUNTER — TELEPHONE (OUTPATIENT)
Age: 33
End: 2023-06-30

## 2023-06-30 RX ORDER — ONDANSETRON 4 MG/1
4 TABLET, ORALLY DISINTEGRATING ORAL 3 TIMES DAILY PRN
Qty: 21 TABLET | Refills: 0 | Status: SHIPPED | OUTPATIENT
Start: 2023-06-30

## 2023-07-18 ENCOUNTER — CLINICAL DOCUMENTATION (OUTPATIENT)
Age: 33
End: 2023-07-18

## 2023-08-30 ENCOUNTER — TELEPHONE (OUTPATIENT)
Age: 33
End: 2023-08-30

## 2023-08-30 NOTE — TELEPHONE ENCOUNTER
Patient call into triage line. Patient is complaining of bleeding from left breast nipple and soreness in left breast.  She has not  for 18 months. Her  first noticed the bleeding about 2 months ago. She reports that she can express blood from her nipple. Patient is out of town this week. Scheduled patient for appointment with Dr. Seth Shook on 9/8/23 at 10:45 AM. Verbalized understanding.

## 2023-08-30 NOTE — TELEPHONE ENCOUNTER
Patient states that she has been having bleeding from her lt breast on/off for a couple months. She is having achey pain.  Please advise

## 2023-08-30 NOTE — TELEPHONE ENCOUNTER
She needs to be seen by a gyn or someone for a stat mammogram and ultrasound. Find out if she has a gyn.  Josiane Virk

## 2023-08-31 NOTE — TELEPHONE ENCOUNTER
LVM to patient referring to gyn. Per epic, she has an appt with gyn 9/8/2023. No need to call office back unless further questions.

## 2023-09-08 ENCOUNTER — OFFICE VISIT (OUTPATIENT)
Age: 33
End: 2023-09-08
Payer: COMMERCIAL

## 2023-09-08 VITALS — WEIGHT: 206 LBS | DIASTOLIC BLOOD PRESSURE: 104 MMHG | SYSTOLIC BLOOD PRESSURE: 162 MMHG | BODY MASS INDEX: 33.25 KG/M2

## 2023-09-08 DIAGNOSIS — F31.32 BIPOLAR DISORDER, CURRENT EPISODE DEPRESSED, MODERATE (HCC): ICD-10-CM

## 2023-09-08 DIAGNOSIS — N64.52 NIPPLE DISCHARGE: ICD-10-CM

## 2023-09-08 DIAGNOSIS — N64.4 BREAST PAIN IN FEMALE: Primary | ICD-10-CM

## 2023-09-08 DIAGNOSIS — N91.2 AMENORRHEA: ICD-10-CM

## 2023-09-08 DIAGNOSIS — N64.59 BLEEDING FROM NIPPLE IN FEMALE: ICD-10-CM

## 2023-09-08 LAB
HCG, PREGNANCY, URINE, POC: NEGATIVE
PROLACTIN SERPL-MCNC: 14.6 NG/ML
VALID INTERNAL CONTROL, POC: YES

## 2023-09-08 PROCEDURE — 99214 OFFICE O/P EST MOD 30 MIN: CPT | Performed by: OBSTETRICS & GYNECOLOGY

## 2023-09-08 PROCEDURE — 81025 URINE PREGNANCY TEST: CPT | Performed by: OBSTETRICS & GYNECOLOGY

## 2023-09-08 PROCEDURE — 3080F DIAST BP >= 90 MM HG: CPT | Performed by: OBSTETRICS & GYNECOLOGY

## 2023-09-08 PROCEDURE — 3077F SYST BP >= 140 MM HG: CPT | Performed by: OBSTETRICS & GYNECOLOGY

## 2023-09-08 NOTE — PROGRESS NOTES
Problem Visit    Sunshine Luu is a 35 y.o. presenting for problem visit. Her main concern today is bleeding  and discharge from her left nipple. She states she initially noticed this while a few months ago. It subsided for a short period of time. It occurred again last week while on vacation. It occurs unprovoked as well as with manipulation of the nipple. She was able to hand express and visibly see bloody discharge. This breast is painful to her with a dull aching sensation that comes and goes. She has not  for >1 year. Denies lumps, skin changes, color to her skin or axillary swelling. No trauma to breast.     There is breast cancer in her maternal grandmother. BP is elevated in office. She states she was rushing to this appointment and did not take her BP medication. She is also extremely anxious. Ob/Gyn Hx:      LMP-  2023   Menses- regular, heavy   Contraception- none    SA- yes,         Past Medical History:   Diagnosis Date    Asthma     Cold and exercise induced    Bipolar 1 disorder ([address])     Gestational diabetes     diet controlled    High cholesterol     Hypertension     Migraine     Obesity (BMI 30.0-34. 9)     Postpartum depression        History reviewed. No pertinent surgical history. Family History   Problem Relation Age of Onset    Hypertension Father     Diabetes Mother     Other Mother         fibromyalgia    Hypertension Mother     Sleep Apnea Mother        Social History     Socioeconomic History    Marital status:      Spouse name: Not on file    Number of children: Not on file    Years of education: Not on file    Highest education level: Not on file   Occupational History    Not on file   Tobacco Use    Smoking status: Never    Smokeless tobacco: Never   Vaping Use    Vaping Use: Never used   Substance and Sexual Activity    Alcohol use:  Yes     Alcohol/week: 5.0 standard drinks    Drug use: No    Sexual activity: Not on file   Other Topics

## 2023-09-10 RX ORDER — LAMOTRIGINE 25 MG/1
TABLET ORAL
Qty: 180 TABLET | Refills: 1 | Status: SHIPPED | OUTPATIENT
Start: 2023-09-10

## 2023-09-11 ENCOUNTER — HOSPITAL ENCOUNTER (OUTPATIENT)
Facility: HOSPITAL | Age: 33
Discharge: HOME | End: 2023-09-14
Payer: COMMERCIAL

## 2023-09-11 DIAGNOSIS — N64.59 BLEEDING FROM NIPPLE IN FEMALE: ICD-10-CM

## 2023-09-11 DIAGNOSIS — N64.4 BREAST PAIN IN FEMALE: ICD-10-CM

## 2023-09-11 PROCEDURE — G0279 TOMOSYNTHESIS, MAMMO: HCPCS

## 2023-09-11 PROCEDURE — 76642 ULTRASOUND BREAST LIMITED: CPT

## 2023-09-27 NOTE — PROGRESS NOTES
Chief Complaint   Patient presents with    Migraine     Migraine started about 2 wks ago, just let up last night in to today. Ramon caused hives at last injection in Nov.  Was working well prior to then. Did not seek out another alternative as migraines were controlled until this last episode.     BP high in office, did not take meds this am code        Ezio Tiwari, SILVA - NP  9/27/2023  6:19 AM      Please note that this chart was generated using voice recognition Dragon dictation software. Although every effort was made to ensure the accuracy of this automated transcription, some errors in transcription may have occurred. [facility]: FARIBA VASQUEZ  [address] [facility], [address]    Phone [phone]

## 2023-10-17 ENCOUNTER — TELEPHONE (OUTPATIENT)
Age: 33
End: 2023-10-17

## 2023-10-17 DIAGNOSIS — G43.111 MIGRAINE WITH AURA, INTRACTABLE, WITH STATUS MIGRAINOSUS: Primary | ICD-10-CM

## 2023-10-17 RX ORDER — GALCANEZUMAB 120 MG/ML
INJECTION, SOLUTION SUBCUTANEOUS
Qty: 1 ML | Refills: 4 | Status: SHIPPED | OUTPATIENT
Start: 2023-10-17

## 2023-10-17 NOTE — TELEPHONE ENCOUNTER
PT came in to reschedule appointment that was scheduled for today 10.17.2023. PT wanted to receive a call about being able to get her Emgality refilled since next appointment is not until 2.20.2024. Good call back number is [phone].

## 2023-10-25 RX ORDER — NIFEDIPINE 60 MG/1
60 TABLET, EXTENDED RELEASE ORAL DAILY
Qty: 30 TABLET | Refills: 5 | Status: SHIPPED | OUTPATIENT
Start: 2023-10-25

## 2023-11-13 DIAGNOSIS — G43.111 MIGRAINE WITH AURA, INTRACTABLE, WITH STATUS MIGRAINOSUS: ICD-10-CM

## 2023-11-17 RX ORDER — GALCANEZUMAB 120 MG/ML
INJECTION, SOLUTION SUBCUTANEOUS
Qty: 1 ML | Refills: 5 | Status: SHIPPED | OUTPATIENT
Start: 2023-11-17

## 2023-11-30 ENCOUNTER — TELEPHONE (OUTPATIENT)
Age: 33
End: 2023-11-30

## 2023-11-30 RX ORDER — ROSUVASTATIN CALCIUM 5 MG/1
5 TABLET, COATED ORAL DAILY
Qty: 90 TABLET | Refills: 1 | Status: SHIPPED | OUTPATIENT
Start: 2023-11-30

## 2023-11-30 RX ORDER — DULOXETIN HYDROCHLORIDE 60 MG/1
60 CAPSULE, DELAYED RELEASE ORAL DAILY
Qty: 90 CAPSULE | Refills: 1 | Status: SHIPPED | OUTPATIENT
Start: 2023-11-30

## 2023-11-30 NOTE — TELEPHONE ENCOUNTER
We received a fax refill request for [facility]. Please escribe Rosuvastatin Calcium to their pharmacy. The pharmacy is correct in the chart and they are requesting a 90 day supply. We received a fax refill request for [facility]. Please escribe Duloxetine HCL DR to their pharmacy. The pharmacy is correct in the chart and they are requesting a 90 day supply.

## 2024-03-24 RX ORDER — NIFEDIPINE 60 MG/1
60 TABLET, EXTENDED RELEASE ORAL DAILY
Qty: 30 TABLET | Refills: 5 | Status: SHIPPED | OUTPATIENT
Start: 2024-03-24

## 2024-03-24 RX ORDER — METOPROLOL SUCCINATE 100 MG/1
100 TABLET, EXTENDED RELEASE ORAL DAILY
Qty: 30 TABLET | Refills: 1 | Status: SHIPPED | OUTPATIENT
Start: 2024-03-24

## 2024-04-08 ENCOUNTER — OFFICE VISIT (OUTPATIENT)
Age: 34
End: 2024-04-08
Payer: COMMERCIAL

## 2024-04-08 VITALS
DIASTOLIC BLOOD PRESSURE: 95 MMHG | HEIGHT: 66 IN | BODY MASS INDEX: 32.95 KG/M2 | OXYGEN SATURATION: 99 % | HEART RATE: 80 BPM | WEIGHT: 205 LBS | RESPIRATION RATE: 20 BRPM | TEMPERATURE: 98.1 F | SYSTOLIC BLOOD PRESSURE: 139 MMHG

## 2024-04-08 DIAGNOSIS — I10 ESSENTIAL (PRIMARY) HYPERTENSION: Primary | ICD-10-CM

## 2024-04-08 DIAGNOSIS — R53.83 FATIGUE, UNSPECIFIED TYPE: ICD-10-CM

## 2024-04-08 DIAGNOSIS — R73.01 IMPAIRED FASTING GLUCOSE: ICD-10-CM

## 2024-04-08 PROCEDURE — 99213 OFFICE O/P EST LOW 20 MIN: CPT | Performed by: PHYSICIAN ASSISTANT

## 2024-04-08 PROCEDURE — 3080F DIAST BP >= 90 MM HG: CPT | Performed by: PHYSICIAN ASSISTANT

## 2024-04-08 PROCEDURE — 3075F SYST BP GE 130 - 139MM HG: CPT | Performed by: PHYSICIAN ASSISTANT

## 2024-04-08 RX ORDER — LAMOTRIGINE 200 MG/1
200 TABLET ORAL DAILY
COMMUNITY
Start: 2024-04-08

## 2024-04-08 RX ORDER — DEXMETHYLPHENIDATE HYDROCHLORIDE 10 MG/1
CAPSULE, EXTENDED RELEASE ORAL DAILY
COMMUNITY
Start: 2024-04-08

## 2024-04-08 SDOH — ECONOMIC STABILITY: FOOD INSECURITY: WITHIN THE PAST 12 MONTHS, YOU WORRIED THAT YOUR FOOD WOULD RUN OUT BEFORE YOU GOT MONEY TO BUY MORE.: NEVER TRUE

## 2024-04-08 SDOH — ECONOMIC STABILITY: HOUSING INSECURITY
IN THE LAST 12 MONTHS, WAS THERE A TIME WHEN YOU DID NOT HAVE A STEADY PLACE TO SLEEP OR SLEPT IN A SHELTER (INCLUDING NOW)?: NO

## 2024-04-08 SDOH — ECONOMIC STABILITY: INCOME INSECURITY: HOW HARD IS IT FOR YOU TO PAY FOR THE VERY BASICS LIKE FOOD, HOUSING, MEDICAL CARE, AND HEATING?: SOMEWHAT HARD

## 2024-04-08 SDOH — ECONOMIC STABILITY: FOOD INSECURITY: WITHIN THE PAST 12 MONTHS, THE FOOD YOU BOUGHT JUST DIDN'T LAST AND YOU DIDN'T HAVE MONEY TO GET MORE.: SOMETIMES TRUE

## 2024-04-08 ASSESSMENT — PATIENT HEALTH QUESTIONNAIRE - PHQ9
SUM OF ALL RESPONSES TO PHQ QUESTIONS 1-9: 0
8. MOVING OR SPEAKING SO SLOWLY THAT OTHER PEOPLE COULD HAVE NOTICED. OR THE OPPOSITE, BEING SO FIGETY OR RESTLESS THAT YOU HAVE BEEN MOVING AROUND A LOT MORE THAN USUAL: NOT AT ALL
4. FEELING TIRED OR HAVING LITTLE ENERGY: NOT AT ALL
1. LITTLE INTEREST OR PLEASURE IN DOING THINGS: NOT AT ALL
SUM OF ALL RESPONSES TO PHQ QUESTIONS 1-9: 0
7. TROUBLE CONCENTRATING ON THINGS, SUCH AS READING THE NEWSPAPER OR WATCHING TELEVISION: NOT AT ALL
SUM OF ALL RESPONSES TO PHQ9 QUESTIONS 1 & 2: 0
5. POOR APPETITE OR OVEREATING: NOT AT ALL
9. THOUGHTS THAT YOU WOULD BE BETTER OFF DEAD, OR OF HURTING YOURSELF: NOT AT ALL
10. IF YOU CHECKED OFF ANY PROBLEMS, HOW DIFFICULT HAVE THESE PROBLEMS MADE IT FOR YOU TO DO YOUR WORK, TAKE CARE OF THINGS AT HOME, OR GET ALONG WITH OTHER PEOPLE: NOT DIFFICULT AT ALL
3. TROUBLE FALLING OR STAYING ASLEEP: NOT AT ALL
SUM OF ALL RESPONSES TO PHQ QUESTIONS 1-9: 0
2. FEELING DOWN, DEPRESSED OR HOPELESS: NOT AT ALL
6. FEELING BAD ABOUT YOURSELF - OR THAT YOU ARE A FAILURE OR HAVE LET YOURSELF OR YOUR FAMILY DOWN: NOT AT ALL
SUM OF ALL RESPONSES TO PHQ QUESTIONS 1-9: 0

## 2024-04-08 NOTE — PROGRESS NOTES
Yaz Walters is a 33 y.o. female , id x 2(name and ). Reviewed record, history, and  medications.      Chief Complaint   Patient presents with    Hypertension     Patient c/o increase BP, this morning 178/101, at doctors office. Patient states always high, not managed with dosing.         Vitals:    24 1441   Weight: 93 kg (205 lb)   Height: 1.676 m (5' 6\")                 2024     2:44 PM   PHQ-9    Little interest or pleasure in doing things 0   Feeling down, depressed, or hopeless 0   Trouble falling or staying asleep, or sleeping too much 0   Feeling tired or having little energy 0   Poor appetite or overeating 0   Feeling bad about yourself - or that you are a failure or have let yourself or your family down 0   Trouble concentrating on things, such as reading the newspaper or watching television 0   Moving or speaking so slowly that other people could have noticed. Or the opposite - being so fidgety or restless that you have been moving around a lot more than usual 0   Thoughts that you would be better off dead, or of hurting yourself in some way 0   PHQ-2 Score 0   PHQ-9 Total Score 0   If you checked off any problems, how difficult have these problems made it for you to do your work, take care of things at home, or get along with other people? 0            No data to display                Social Determinants of Health     Tobacco Use: Low Risk  (2023)    Patient History     Smoking Tobacco Use: Never     Smokeless Tobacco Use: Never     Passive Exposure: Not on file   Alcohol Use: Not on file   Financial Resource Strain: Medium Risk (2024)    Overall Financial Resource Strain (CARDIA)     Difficulty of Paying Living Expenses: Somewhat hard   Food Insecurity: Food Insecurity Present (2024)    Hunger Vital Sign     Worried About Running Out of Food in the Last Year: Never true     Ran Out of Food in the Last Year: Sometimes true   Transportation Needs: Unknown (2024)    MANNY 
  BP: (!) 139/95   Site: Left Upper Arm   Position: Sitting   Cuff Size: Large Adult   Pulse: 80   Resp: 20   Temp: 98.1 °F (36.7 °C)   TempSrc: Oral   SpO2: 99%   Weight: 93 kg (205 lb)   Height: 1.676 m (5' 6\")     Physical Examination: General appearance - alert, well appearing, and in no distress  Chest - clear to auscultation, no wheezes, rales or rhonchi, symmetric air entry  Heart - normal rate and regular rhythm, no murmurs noted  Extremities - no pedal edema noted    Assessment/ Plan:   Yaz was seen today for hypertension.    Diagnoses and all orders for this visit:    Essential (primary) hypertension  -     Comprehensive Metabolic Panel    Impaired fasting glucose  -     Hemoglobin A1C; Future  -     Hemoglobin A1C    Fatigue, unspecified type  -     Comprehensive Metabolic Panel  -     TSH  -     Hemoglobin A1C; Future  -     Hemoglobin A1C    Advised patient like for her to have some labs done today in the office since she has not had some in some time.  Also advised the patient to take her blood pressure once a day for the next week and to contact the office with these results.  If the patient's blood pressure is remaining elevated and adjustment of her medication will be needed.  The patient I also spoke about lifestyle changes that she could implement that may help her blood pressure such as getting regular exercise and decreasing her weight.  The patient should make a follow-up appointment with Nicky to follow-up her blood pressure.  Time was used for level of billing: no     No follow-up provider specified.    I have discussed the diagnosis with the patient and the intended plan as seen in the above orders.  The patient has received an after-visit summary and questions were answered concerning future plans.     Medication Side Effects and Warnings were discussed with patient: Yes  Patient Labs were reviewed and or requested: Yes  Patient Past Records were reviewed and or requested

## 2024-04-09 LAB
ALBUMIN SERPL-MCNC: 4 G/DL (ref 3.5–5)
ALBUMIN/GLOB SERPL: 1.2 (ref 1.1–2.2)
ALP SERPL-CCNC: 68 U/L (ref 45–117)
ALT SERPL-CCNC: 82 U/L (ref 12–78)
ANION GAP SERPL CALC-SCNC: 6 MMOL/L (ref 5–15)
AST SERPL-CCNC: 35 U/L (ref 15–37)
BILIRUB SERPL-MCNC: 0.5 MG/DL (ref 0.2–1)
BUN SERPL-MCNC: 13 MG/DL (ref 6–20)
BUN/CREAT SERPL: 9 (ref 12–20)
CALCIUM SERPL-MCNC: 9.5 MG/DL (ref 8.5–10.1)
CHLORIDE SERPL-SCNC: 104 MMOL/L (ref 97–108)
CO2 SERPL-SCNC: 29 MMOL/L (ref 21–32)
CREAT SERPL-MCNC: 1.43 MG/DL (ref 0.55–1.02)
EST. AVERAGE GLUCOSE BLD GHB EST-MCNC: 123 MG/DL
GLOBULIN SER CALC-MCNC: 3.4 G/DL (ref 2–4)
GLUCOSE SERPL-MCNC: 129 MG/DL (ref 65–100)
HBA1C MFR BLD: 5.9 % (ref 4–5.6)
POTASSIUM SERPL-SCNC: 4.3 MMOL/L (ref 3.5–5.1)
PROT SERPL-MCNC: 7.4 G/DL (ref 6.4–8.2)
SODIUM SERPL-SCNC: 139 MMOL/L (ref 136–145)
TSH SERPL DL<=0.05 MIU/L-ACNC: 0.87 UIU/ML (ref 0.36–3.74)

## 2024-05-07 ENCOUNTER — OFFICE VISIT (OUTPATIENT)
Age: 34
End: 2024-05-07
Payer: COMMERCIAL

## 2024-05-07 VITALS
OXYGEN SATURATION: 98 % | TEMPERATURE: 98.2 F | SYSTOLIC BLOOD PRESSURE: 150 MMHG | BODY MASS INDEX: 32.69 KG/M2 | WEIGHT: 203.4 LBS | HEART RATE: 97 BPM | DIASTOLIC BLOOD PRESSURE: 108 MMHG | RESPIRATION RATE: 18 BRPM | HEIGHT: 66 IN

## 2024-05-07 DIAGNOSIS — F31.32 BIPOLAR DISORDER, CURRENT EPISODE DEPRESSED, MODERATE (HCC): ICD-10-CM

## 2024-05-07 DIAGNOSIS — I10 ESSENTIAL (PRIMARY) HYPERTENSION: ICD-10-CM

## 2024-05-07 DIAGNOSIS — R53.83 FATIGUE, UNSPECIFIED TYPE: ICD-10-CM

## 2024-05-07 DIAGNOSIS — I10 ESSENTIAL (PRIMARY) HYPERTENSION: Primary | ICD-10-CM

## 2024-05-07 PROCEDURE — 3080F DIAST BP >= 90 MM HG: CPT | Performed by: NURSE PRACTITIONER

## 2024-05-07 PROCEDURE — 3077F SYST BP >= 140 MM HG: CPT | Performed by: NURSE PRACTITIONER

## 2024-05-07 PROCEDURE — 99214 OFFICE O/P EST MOD 30 MIN: CPT | Performed by: NURSE PRACTITIONER

## 2024-05-07 RX ORDER — VALSARTAN AND HYDROCHLOROTHIAZIDE 160; 12.5 MG/1; MG/1
1 TABLET, FILM COATED ORAL DAILY
Qty: 30 TABLET | Refills: 3 | Status: SHIPPED | OUTPATIENT
Start: 2024-05-07

## 2024-05-07 RX ORDER — METOPROLOL SUCCINATE 100 MG/1
100 TABLET, EXTENDED RELEASE ORAL DAILY
Qty: 30 TABLET | Refills: 5 | Status: SHIPPED | OUTPATIENT
Start: 2024-05-07

## 2024-05-07 NOTE — PROGRESS NOTES
Chief Complaint   Patient presents with    Follow-up    Blood Pressure Check     Patient is in the office today for a f/u on bp.  Patient stated has been pretty much stable for the most part.  No other concerns.      \"Have you been to the ER, urgent care clinic since your last visit?  Hospitalized since your last visit?\"    NO    “Have you seen or consulted any other health care providers outside of [facility] since your last visit?”    NO            Click Here for Release of Records Request

## 2024-05-07 NOTE — PROGRESS NOTES
Yaz Walters (:  1990) is a 33 y.o. female,Established patient, here for evaluation of the following chief complaint(s):  Follow-up and Blood Pressure Check      Assessment & Plan   1. Essential (primary) hypertension  -     Comprehensive Metabolic Panel; Future  -     CBC with Auto Differential; Future  2. Bipolar disorder, current episode depressed, moderate (HCC)  Assessment & Plan:   Monitored by specialist- no acute findings meriting change in the plan  3. Fatigue, unspecified type  -     CBC with Auto Differential; Future    Changed procardia to diovan hct 160/12.5  Keep metoprolol  May need to refer to Renal pending GFR and Creat again  May also need Renal US    No follow-ups on file.       Subjective   HPI  She is here for bp check  Still elevated at home with 150/100 readings  On toprol xl 100mg and procardia  Chronic morning fatigue as well  Getting a lot of swelling through the day that makes her void all night    Review of Systems   A comprehensive review of system was obtained and negative except findings in the HPI    BP (!) 150/108 (Site: Right Upper Arm, Position: Sitting)   Pulse 97   Temp 98.2 °F (36.8 °C) (Oral)   Resp 18   Ht 1.676 m (5' 6\")   Wt 92.3 kg (203 lb 6.4 oz)   SpO2 98%   BMI 32.83 kg/m²   Objective   Physical Exam  Vitals and nursing note reviewed.   Constitutional:       General: She is not in acute distress.     Appearance: Normal appearance.   Cardiovascular:      Rate and Rhythm: Normal rate and regular rhythm.   Pulmonary:      Effort: Pulmonary effort is normal. No respiratory distress.      Breath sounds: Normal breath sounds. No wheezing or rhonchi.   Musculoskeletal:         General: No swelling.   Neurological:      General: No focal deficit present.      Mental Status: She is alert and oriented to person, place, and time.   Psychiatric:         Mood and Affect: Mood normal.                  An electronic signature was used to authenticate this

## 2024-05-08 LAB
ALBUMIN SERPL-MCNC: 4.1 G/DL (ref 3.5–5)
ALBUMIN/GLOB SERPL: 1.2 (ref 1.1–2.2)
ALP SERPL-CCNC: 57 U/L (ref 45–117)
ALT SERPL-CCNC: 72 U/L (ref 12–78)
ANION GAP SERPL CALC-SCNC: 5 MMOL/L (ref 5–15)
AST SERPL-CCNC: 37 U/L (ref 15–37)
BASOPHILS # BLD: 0 K/UL (ref 0–0.1)
BASOPHILS NFR BLD: 1 % (ref 0–1)
BILIRUB SERPL-MCNC: 0.6 MG/DL (ref 0.2–1)
BUN SERPL-MCNC: 11 MG/DL (ref 6–20)
BUN/CREAT SERPL: 9 (ref 12–20)
CALCIUM SERPL-MCNC: 9.5 MG/DL (ref 8.5–10.1)
CHLORIDE SERPL-SCNC: 108 MMOL/L (ref 97–108)
CO2 SERPL-SCNC: 27 MMOL/L (ref 21–32)
CREAT SERPL-MCNC: 1.2 MG/DL (ref 0.55–1.02)
DIFFERENTIAL METHOD BLD: NORMAL
EOSINOPHIL # BLD: 0.2 K/UL (ref 0–0.4)
EOSINOPHIL NFR BLD: 5 % (ref 0–7)
ERYTHROCYTE [DISTWIDTH] IN BLOOD BY AUTOMATED COUNT: 13.3 % (ref 11.5–14.5)
GLOBULIN SER CALC-MCNC: 3.4 G/DL (ref 2–4)
GLUCOSE SERPL-MCNC: 91 MG/DL (ref 65–100)
HCT VFR BLD AUTO: 38.2 % (ref 35–47)
HGB BLD-MCNC: 12.9 G/DL (ref 11.5–16)
IMM GRANULOCYTES # BLD AUTO: 0 K/UL (ref 0–0.04)
IMM GRANULOCYTES NFR BLD AUTO: 0 % (ref 0–0.5)
LYMPHOCYTES # BLD: 1.7 K/UL (ref 0.8–3.5)
LYMPHOCYTES NFR BLD: 34 % (ref 12–49)
MCH RBC QN AUTO: 30.4 PG (ref 26–34)
MCHC RBC AUTO-ENTMCNC: 33.8 G/DL (ref 30–36.5)
MCV RBC AUTO: 90.1 FL (ref 80–99)
MONOCYTES # BLD: 0.6 K/UL (ref 0–1)
MONOCYTES NFR BLD: 11 % (ref 5–13)
NEUTS SEG # BLD: 2.5 K/UL (ref 1.8–8)
NEUTS SEG NFR BLD: 49 % (ref 32–75)
NRBC # BLD: 0 K/UL (ref 0–0.01)
NRBC BLD-RTO: 0 PER 100 WBC
PLATELET # BLD AUTO: 260 K/UL (ref 150–400)
PMV BLD AUTO: 11 FL (ref 8.9–12.9)
POTASSIUM SERPL-SCNC: 4.1 MMOL/L (ref 3.5–5.1)
PROT SERPL-MCNC: 7.5 G/DL (ref 6.4–8.2)
RBC # BLD AUTO: 4.24 M/UL (ref 3.8–5.2)
SODIUM SERPL-SCNC: 140 MMOL/L (ref 136–145)
WBC # BLD AUTO: 5 K/UL (ref 3.6–11)

## 2024-07-05 ENCOUNTER — TELEPHONE (OUTPATIENT)
Age: 34
End: 2024-07-05

## 2024-07-05 NOTE — TELEPHONE ENCOUNTER
Re: Emgality    Per review PA recently termed, pt does have a follow up set up and has been on med since 2021. Chronic migraine reduced by 50% per notes review. Epic Key# [id number], approval rcvd. Auth# [id number], effective 07/05/24-07/05/25, scanned to chart, update to nurse. Fax to pharmacy.

## 2024-08-05 RX ORDER — VALSARTAN AND HYDROCHLOROTHIAZIDE 160; 12.5 MG/1; MG/1
1 TABLET, FILM COATED ORAL DAILY
Qty: 90 TABLET | Refills: 1 | Status: SHIPPED | OUTPATIENT
Start: 2024-08-05

## 2024-08-06 ENCOUNTER — OFFICE VISIT (OUTPATIENT)
Age: 34
End: 2024-08-06
Payer: COMMERCIAL

## 2024-08-06 VITALS
HEART RATE: 87 BPM | HEIGHT: 66 IN | SYSTOLIC BLOOD PRESSURE: 165 MMHG | DIASTOLIC BLOOD PRESSURE: 114 MMHG | RESPIRATION RATE: 14 BRPM | BODY MASS INDEX: 31.98 KG/M2 | OXYGEN SATURATION: 98 % | WEIGHT: 199 LBS

## 2024-08-06 DIAGNOSIS — G43.111 MIGRAINE WITH AURA, INTRACTABLE, WITH STATUS MIGRAINOSUS: ICD-10-CM

## 2024-08-06 PROCEDURE — 3077F SYST BP >= 140 MM HG: CPT | Performed by: PSYCHIATRY & NEUROLOGY

## 2024-08-06 PROCEDURE — 99214 OFFICE O/P EST MOD 30 MIN: CPT | Performed by: PSYCHIATRY & NEUROLOGY

## 2024-08-06 PROCEDURE — 3080F DIAST BP >= 90 MM HG: CPT | Performed by: PSYCHIATRY & NEUROLOGY

## 2024-08-06 RX ORDER — GALCANEZUMAB 120 MG/ML
INJECTION, SOLUTION SUBCUTANEOUS
Qty: 1 ML | Refills: 11 | Status: SHIPPED | OUTPATIENT
Start: 2024-08-06

## 2024-08-06 NOTE — PROGRESS NOTES
[facility] at [facility] at [address] [place] [address] [address] [facility] [address] [place] [address] [phone]              Chief Complaint   Patient presents with    Migraine     Emgality / reports \"hardly ever\" migraines // Tylenol used as rescue med      Current Outpatient Medications   Medication Sig Dispense Refill    valsartan-hydroCHLOROthiazide (DIOVAN-HCT) 160-12.5 MG per tablet TAKE 1 TABLET BY MOUTH EVERY DAY 90 tablet 1    metoprolol succinate (TOPROL XL) 100 MG extended release tablet Take 1 tablet by mouth daily 30 tablet 5    Dexmethylphenidate HCl ER 10 MG CP24 daily.      lamoTRIgine (LAMICTAL) 200 MG tablet Take 1 tablet by mouth daily      DULoxetine (CYMBALTA) 60 MG extended release capsule Take 1 capsule by mouth daily 90 capsule 1    rosuvastatin (CRESTOR) 5 MG tablet Take 1 tablet by mouth daily 90 tablet 1    Galcanezumab-gnlm (EMGALITY) 120 MG/ML SOAJ INJECT 1 ML UNDER THE SKIN EVERY 28 DAYS 1 mL 5    ondansetron (ZOFRAN-ODT) 4 MG disintegrating tablet Take 1 tablet by mouth 3 times daily as needed for Nausea or Vomiting 21 tablet 0    butalbital-acetaminophen-caffeine (FIORICET, ESGIC) -40 MG per tablet Take 1 tablet by mouth every 6 hours as needed      loratadine (CLARITIN) 10 MG tablet Take 1 tablet by mouth (Patient not taking: Reported on 5/7/2024)       No current facility-administered medications for this visit.      Allergies   Allergen Reactions    Sumatriptan Nausea Only, Other (See Comments) and Dizziness or Vertigo     Blurred vision & hallucinations    Fremanezumab-Vfrm Hives    Triptans Dizziness or Vertigo and Hives    Ubrogepant Hives    Coenzyme Q10 Rash     Social History     Tobacco Use    Smoking status: Never    Smokeless tobacco: Never   Vaping Use    Vaping Use: Never used   Substance Use Topics    Alcohol use: Yes

## 2024-08-25 RX ORDER — NIFEDIPINE 60 MG/1
60 TABLET, EXTENDED RELEASE ORAL DAILY
Qty: 90 TABLET | Refills: 1 | Status: SHIPPED | OUTPATIENT
Start: 2024-08-25

## 2024-09-18 RX ORDER — METOPROLOL SUCCINATE 100 MG/1
100 TABLET, EXTENDED RELEASE ORAL DAILY
Qty: 30 TABLET | Refills: 5 | Status: ON HOLD | OUTPATIENT
Start: 2024-09-18

## 2024-09-20 ENCOUNTER — HOSPITAL ENCOUNTER (INPATIENT)
Facility: HOSPITAL | Age: 34
LOS: 4 days | Discharge: HOME | DRG: 305 | End: 2024-09-24
Attending: STUDENT IN AN ORGANIZED HEALTH CARE EDUCATION/TRAINING PROGRAM | Admitting: STUDENT IN AN ORGANIZED HEALTH CARE EDUCATION/TRAINING PROGRAM
Payer: COMMERCIAL

## 2024-09-20 ENCOUNTER — APPOINTMENT (OUTPATIENT)
Facility: HOSPITAL | Age: 34
DRG: 305 | End: 2024-09-20
Payer: COMMERCIAL

## 2024-09-20 DIAGNOSIS — I16.1 HYPERTENSIVE EMERGENCY: Primary | ICD-10-CM

## 2024-09-20 DIAGNOSIS — I10 ESSENTIAL HYPERTENSION: ICD-10-CM

## 2024-09-20 DIAGNOSIS — R90.89 ABNORMAL FINDING ON MRI OF BRAIN: ICD-10-CM

## 2024-09-20 LAB
ALBUMIN SERPL-MCNC: 4 G/DL (ref 3.5–5)
ALBUMIN/GLOB SERPL: 1 (ref 1.1–2.2)
ALP SERPL-CCNC: 51 U/L (ref 45–117)
ALT SERPL-CCNC: 102 U/L (ref 12–78)
ANION GAP SERPL CALC-SCNC: 4 MMOL/L (ref 2–12)
AST SERPL-CCNC: 58 U/L (ref 15–37)
BASOPHILS # BLD: 0 K/UL (ref 0–0.1)
BASOPHILS NFR BLD: 1 % (ref 0–1)
BILIRUB SERPL-MCNC: 0.9 MG/DL (ref 0.2–1)
BUN SERPL-MCNC: 11 MG/DL (ref 6–20)
BUN/CREAT SERPL: 9 (ref 12–20)
CALCIUM SERPL-MCNC: 9.5 MG/DL (ref 8.5–10.1)
CHLORIDE SERPL-SCNC: 104 MMOL/L (ref 97–108)
CO2 SERPL-SCNC: 28 MMOL/L (ref 21–32)
COMMENT:: NORMAL
CREAT SERPL-MCNC: 1.17 MG/DL (ref 0.55–1.02)
DIFFERENTIAL METHOD BLD: NORMAL
EKG ATRIAL RATE: 83 BPM
EKG DIAGNOSIS: NORMAL
EKG P AXIS: 51 DEGREES
EKG P-R INTERVAL: 136 MS
EKG Q-T INTERVAL: 392 MS
EKG QRS DURATION: 84 MS
EKG QTC CALCULATION (BAZETT): 460 MS
EKG R AXIS: 14 DEGREES
EKG T AXIS: 50 DEGREES
EKG VENTRICULAR RATE: 83 BPM
EOSINOPHIL # BLD: 0.2 K/UL (ref 0–0.4)
EOSINOPHIL NFR BLD: 3 % (ref 0–7)
ERYTHROCYTE [DISTWIDTH] IN BLOOD BY AUTOMATED COUNT: 12.6 % (ref 11.5–14.5)
GLOBULIN SER CALC-MCNC: 4 G/DL (ref 2–4)
GLUCOSE SERPL-MCNC: 120 MG/DL (ref 65–100)
HCT VFR BLD AUTO: 39.5 % (ref 35–47)
HGB BLD-MCNC: 13.2 G/DL (ref 11.5–16)
IMM GRANULOCYTES # BLD AUTO: 0 K/UL (ref 0–0.04)
IMM GRANULOCYTES NFR BLD AUTO: 0 % (ref 0–0.5)
LYMPHOCYTES # BLD: 1.9 K/UL (ref 0.8–3.5)
LYMPHOCYTES NFR BLD: 29 % (ref 12–49)
MCH RBC QN AUTO: 29.9 PG (ref 26–34)
MCHC RBC AUTO-ENTMCNC: 33.4 G/DL (ref 30–36.5)
MCV RBC AUTO: 89.6 FL (ref 80–99)
MONOCYTES # BLD: 0.7 K/UL (ref 0–1)
MONOCYTES NFR BLD: 10 % (ref 5–13)
NEUTS SEG # BLD: 3.8 K/UL (ref 1.8–8)
NEUTS SEG NFR BLD: 57 % (ref 32–75)
NRBC # BLD: 0 K/UL (ref 0–0.01)
NRBC BLD-RTO: 0 PER 100 WBC
NT PRO BNP: 319 PG/ML
PLATELET # BLD AUTO: 227 K/UL (ref 150–400)
PMV BLD AUTO: 10.9 FL (ref 8.9–12.9)
POTASSIUM SERPL-SCNC: 3.3 MMOL/L (ref 3.5–5.1)
PROT SERPL-MCNC: 8 G/DL (ref 6.4–8.2)
RBC # BLD AUTO: 4.41 M/UL (ref 3.8–5.2)
SODIUM SERPL-SCNC: 136 MMOL/L (ref 136–145)
SPECIMEN HOLD: NORMAL
TROPONIN I SERPL HS-MCNC: 79 NG/L (ref 0–51)
TROPONIN I SERPL HS-MCNC: 80 NG/L (ref 0–51)
WBC # BLD AUTO: 6.7 K/UL (ref 3.6–11)

## 2024-09-20 PROCEDURE — 2580000003 HC RX 258: Performed by: STUDENT IN AN ORGANIZED HEALTH CARE EDUCATION/TRAINING PROGRAM

## 2024-09-20 PROCEDURE — 85025 COMPLETE CBC W/AUTO DIFF WBC: CPT

## 2024-09-20 PROCEDURE — 96375 TX/PRO/DX INJ NEW DRUG ADDON: CPT

## 2024-09-20 PROCEDURE — 6360000004 HC RX CONTRAST MEDICATION: Performed by: RADIOLOGY

## 2024-09-20 PROCEDURE — 6360000002 HC RX W HCPCS: Performed by: STUDENT IN AN ORGANIZED HEALTH CARE EDUCATION/TRAINING PROGRAM

## 2024-09-20 PROCEDURE — 83880 ASSAY OF NATRIURETIC PEPTIDE: CPT

## 2024-09-20 PROCEDURE — 99285 EMERGENCY DEPT VISIT HI MDM: CPT

## 2024-09-20 PROCEDURE — 84443 ASSAY THYROID STIM HORMONE: CPT

## 2024-09-20 PROCEDURE — 80053 COMPREHEN METABOLIC PANEL: CPT

## 2024-09-20 PROCEDURE — 74174 CTA ABD&PLVS W/CONTRAST: CPT

## 2024-09-20 PROCEDURE — 36415 COLL VENOUS BLD VENIPUNCTURE: CPT

## 2024-09-20 PROCEDURE — 6370000000 HC RX 637 (ALT 250 FOR IP): Performed by: STUDENT IN AN ORGANIZED HEALTH CARE EDUCATION/TRAINING PROGRAM

## 2024-09-20 PROCEDURE — 93005 ELECTROCARDIOGRAM TRACING: CPT

## 2024-09-20 PROCEDURE — 84484 ASSAY OF TROPONIN QUANT: CPT

## 2024-09-20 PROCEDURE — 2060000000 HC ICU INTERMEDIATE R&B

## 2024-09-20 PROCEDURE — 96374 THER/PROPH/DIAG INJ IV PUSH: CPT

## 2024-09-20 PROCEDURE — 70450 CT HEAD/BRAIN W/O DYE: CPT

## 2024-09-20 RX ORDER — ONDANSETRON 2 MG/ML
4 INJECTION INTRAMUSCULAR; INTRAVENOUS ONCE
Status: COMPLETED | OUTPATIENT
Start: 2024-09-20 | End: 2024-09-20

## 2024-09-20 RX ORDER — ROSUVASTATIN CALCIUM 10 MG/1
5 TABLET, COATED ORAL NIGHTLY
Status: DISCONTINUED | OUTPATIENT
Start: 2024-09-20 | End: 2024-09-24 | Stop reason: HOSPADM

## 2024-09-20 RX ORDER — SODIUM CHLORIDE 9 MG/ML
INJECTION, SOLUTION INTRAVENOUS PRN
Status: DISCONTINUED | OUTPATIENT
Start: 2024-09-20 | End: 2024-09-24 | Stop reason: HOSPADM

## 2024-09-20 RX ORDER — ASPIRIN 325 MG
325 TABLET ORAL
Status: COMPLETED | OUTPATIENT
Start: 2024-09-20 | End: 2024-09-20

## 2024-09-20 RX ORDER — METOPROLOL SUCCINATE 50 MG/1
100 TABLET, EXTENDED RELEASE ORAL DAILY
Status: DISCONTINUED | OUTPATIENT
Start: 2024-09-20 | End: 2024-09-22

## 2024-09-20 RX ORDER — LAMOTRIGINE 100 MG/1
100 TABLET ORAL DAILY
Status: DISCONTINUED | OUTPATIENT
Start: 2024-09-20 | End: 2024-09-21

## 2024-09-20 RX ORDER — HYDROCHLOROTHIAZIDE 25 MG/1
12.5 TABLET ORAL DAILY
Status: DISCONTINUED | OUTPATIENT
Start: 2024-09-21 | End: 2024-09-23

## 2024-09-20 RX ORDER — IOPAMIDOL 755 MG/ML
100 INJECTION, SOLUTION INTRAVASCULAR
Status: COMPLETED | OUTPATIENT
Start: 2024-09-20 | End: 2024-09-20

## 2024-09-20 RX ORDER — DROPERIDOL 2.5 MG/ML
1.25 INJECTION, SOLUTION INTRAMUSCULAR; INTRAVENOUS ONCE
Status: COMPLETED | OUTPATIENT
Start: 2024-09-20 | End: 2024-09-20

## 2024-09-20 RX ORDER — ACETAMINOPHEN 325 MG/1
650 TABLET ORAL EVERY 6 HOURS PRN
Status: DISCONTINUED | OUTPATIENT
Start: 2024-09-20 | End: 2024-09-24 | Stop reason: HOSPADM

## 2024-09-20 RX ORDER — POTASSIUM CHLORIDE 750 MG/1
40 TABLET, EXTENDED RELEASE ORAL PRN
Status: DISCONTINUED | OUTPATIENT
Start: 2024-09-20 | End: 2024-09-24 | Stop reason: HOSPADM

## 2024-09-20 RX ORDER — SODIUM CHLORIDE 0.9 % (FLUSH) 0.9 %
5-40 SYRINGE (ML) INJECTION EVERY 12 HOURS SCHEDULED
Status: DISCONTINUED | OUTPATIENT
Start: 2024-09-20 | End: 2024-09-24 | Stop reason: HOSPADM

## 2024-09-20 RX ORDER — SODIUM CHLORIDE 0.9 % (FLUSH) 0.9 %
5-40 SYRINGE (ML) INJECTION PRN
Status: DISCONTINUED | OUTPATIENT
Start: 2024-09-20 | End: 2024-09-24 | Stop reason: HOSPADM

## 2024-09-20 RX ORDER — METOPROLOL SUCCINATE 50 MG/1
100 TABLET, EXTENDED RELEASE ORAL DAILY
Status: DISCONTINUED | OUTPATIENT
Start: 2024-09-20 | End: 2024-09-20

## 2024-09-20 RX ORDER — MAGNESIUM SULFATE IN WATER 40 MG/ML
2000 INJECTION, SOLUTION INTRAVENOUS PRN
Status: DISCONTINUED | OUTPATIENT
Start: 2024-09-20 | End: 2024-09-24 | Stop reason: HOSPADM

## 2024-09-20 RX ORDER — VALSARTAN 160 MG/1
160 TABLET ORAL DAILY
Status: DISCONTINUED | OUTPATIENT
Start: 2024-09-21 | End: 2024-09-22

## 2024-09-20 RX ORDER — ENOXAPARIN SODIUM 100 MG/ML
40 INJECTION SUBCUTANEOUS DAILY
Status: DISCONTINUED | OUTPATIENT
Start: 2024-09-20 | End: 2024-09-24 | Stop reason: HOSPADM

## 2024-09-20 RX ORDER — ACETAMINOPHEN 650 MG/1
650 SUPPOSITORY RECTAL EVERY 6 HOURS PRN
Status: DISCONTINUED | OUTPATIENT
Start: 2024-09-20 | End: 2024-09-24 | Stop reason: HOSPADM

## 2024-09-20 RX ORDER — POLYETHYLENE GLYCOL 3350 17 G/17G
17 POWDER, FOR SOLUTION ORAL DAILY PRN
Status: DISCONTINUED | OUTPATIENT
Start: 2024-09-20 | End: 2024-09-24 | Stop reason: HOSPADM

## 2024-09-20 RX ORDER — POTASSIUM CHLORIDE 7.45 MG/ML
10 INJECTION INTRAVENOUS PRN
Status: DISCONTINUED | OUTPATIENT
Start: 2024-09-20 | End: 2024-09-24 | Stop reason: HOSPADM

## 2024-09-20 RX ORDER — ONDANSETRON 2 MG/ML
4 INJECTION INTRAMUSCULAR; INTRAVENOUS EVERY 6 HOURS PRN
Status: DISCONTINUED | OUTPATIENT
Start: 2024-09-20 | End: 2024-09-24 | Stop reason: HOSPADM

## 2024-09-20 RX ORDER — ONDANSETRON 4 MG/1
4 TABLET, ORALLY DISINTEGRATING ORAL EVERY 8 HOURS PRN
Status: DISCONTINUED | OUTPATIENT
Start: 2024-09-20 | End: 2024-09-24 | Stop reason: HOSPADM

## 2024-09-20 RX ORDER — DULOXETIN HYDROCHLORIDE 60 MG/1
60 CAPSULE, DELAYED RELEASE ORAL DAILY
Status: DISCONTINUED | OUTPATIENT
Start: 2024-09-20 | End: 2024-09-24 | Stop reason: HOSPADM

## 2024-09-20 RX ADMIN — ONDANSETRON 4 MG: 2 INJECTION INTRAMUSCULAR; INTRAVENOUS at 17:46

## 2024-09-20 RX ADMIN — DULOXETINE HYDROCHLORIDE 60 MG: 60 CAPSULE, DELAYED RELEASE ORAL at 21:03

## 2024-09-20 RX ADMIN — ROSUVASTATIN 5 MG: 10 TABLET, FILM COATED ORAL at 21:04

## 2024-09-20 RX ADMIN — IOPAMIDOL 100 ML: 755 INJECTION, SOLUTION INTRAVENOUS at 12:56

## 2024-09-20 RX ADMIN — SODIUM CHLORIDE 5 MG/HR: 9 INJECTION, SOLUTION INTRAVENOUS at 12:57

## 2024-09-20 RX ADMIN — SODIUM CHLORIDE 10 MG/HR: 9 INJECTION, SOLUTION INTRAVENOUS at 18:01

## 2024-09-20 RX ADMIN — ASPIRIN 325 MG: 325 TABLET ORAL at 15:14

## 2024-09-20 RX ADMIN — SODIUM CHLORIDE 7.5 MG/HR: 9 INJECTION, SOLUTION INTRAVENOUS at 21:03

## 2024-09-20 RX ADMIN — LAMOTRIGINE 50 MG: 100 TABLET ORAL at 21:03

## 2024-09-20 RX ADMIN — METOPROLOL SUCCINATE 100 MG: 50 TABLET, EXTENDED RELEASE ORAL at 19:12

## 2024-09-20 RX ADMIN — SODIUM CHLORIDE, PRESERVATIVE FREE 10 ML: 5 INJECTION INTRAVENOUS at 21:05

## 2024-09-20 RX ADMIN — DROPERIDOL 1.25 MG: 2.5 INJECTION, SOLUTION INTRAMUSCULAR; INTRAVENOUS at 17:46

## 2024-09-20 ASSESSMENT — LIFESTYLE VARIABLES
HOW MANY STANDARD DRINKS CONTAINING ALCOHOL DO YOU HAVE ON A TYPICAL DAY: 1 OR 2
HOW OFTEN DO YOU HAVE A DRINK CONTAINING ALCOHOL: MONTHLY OR LESS

## 2024-09-20 ASSESSMENT — PAIN SCALES - GENERAL
PAINLEVEL_OUTOF10: 0
PAINLEVEL_OUTOF10: 0

## 2024-09-21 ENCOUNTER — APPOINTMENT (OUTPATIENT)
Facility: HOSPITAL | Age: 34
DRG: 305 | End: 2024-09-21
Attending: INTERNAL MEDICINE
Payer: COMMERCIAL

## 2024-09-21 ENCOUNTER — APPOINTMENT (OUTPATIENT)
Facility: HOSPITAL | Age: 34
DRG: 305 | End: 2024-09-21
Payer: COMMERCIAL

## 2024-09-21 LAB
APPEARANCE UR: CLEAR
BACTERIA URNS QL MICRO: ABNORMAL /HPF
BILIRUB UR QL: NEGATIVE
COLOR UR: ABNORMAL
ECHO AO ASC DIAM: 2.7 CM
ECHO AO ASCENDING AORTA INDEX: 1.34 CM/M2
ECHO AO ROOT DIAM: 3.1 CM
ECHO AO ROOT INDEX: 1.54 CM/M2
ECHO AV MEAN GRADIENT: 5 MMHG
ECHO AV MEAN VELOCITY: 1 M/S
ECHO AV PEAK GRADIENT: 8 MMHG
ECHO AV PEAK VELOCITY: 1.4 M/S
ECHO AV VELOCITY RATIO: 0.5
ECHO AV VTI: 26.9 CM
ECHO BSA: 2.09 M2
ECHO LA DIAMETER INDEX: 1.89 CM/M2
ECHO LA DIAMETER: 3.8 CM
ECHO LA TO AORTIC ROOT RATIO: 1.23
ECHO LA VOL A-L A2C: 74 ML (ref 22–52)
ECHO LA VOL A-L A4C: 45 ML (ref 22–52)
ECHO LA VOL MOD A2C: 70 ML (ref 22–52)
ECHO LA VOL MOD A4C: 42 ML (ref 22–52)
ECHO LA VOLUME AREA LENGTH: 60 ML
ECHO LA VOLUME INDEX A-L A2C: 37 ML/M2 (ref 16–34)
ECHO LA VOLUME INDEX A-L A4C: 22 ML/M2 (ref 16–34)
ECHO LA VOLUME INDEX AREA LENGTH: 30 ML/M2 (ref 16–34)
ECHO LA VOLUME INDEX MOD A2C: 35 ML/M2 (ref 16–34)
ECHO LA VOLUME INDEX MOD A4C: 21 ML/M2 (ref 16–34)
ECHO LV E' LATERAL VELOCITY: 3.6 CM/S
ECHO LV E' SEPTAL VELOCITY: 3.5 CM/S
ECHO LV EDV A2C: 156 ML
ECHO LV EDV A4C: 176 ML
ECHO LV EDV BP: 170 ML (ref 56–104)
ECHO LV EDV INDEX A4C: 88 ML/M2
ECHO LV EDV INDEX BP: 85 ML/M2
ECHO LV EDV NDEX A2C: 78 ML/M2
ECHO LV EJECTION FRACTION A2C: 59 %
ECHO LV EJECTION FRACTION A4C: 53 %
ECHO LV EJECTION FRACTION BIPLANE: 56 % (ref 55–100)
ECHO LV ESV A2C: 64 ML
ECHO LV ESV A4C: 83 ML
ECHO LV ESV BP: 75 ML (ref 19–49)
ECHO LV ESV INDEX A2C: 32 ML/M2
ECHO LV ESV INDEX A4C: 41 ML/M2
ECHO LV ESV INDEX BP: 37 ML/M2
ECHO LV FRACTIONAL SHORTENING: 29 % (ref 28–44)
ECHO LV GLOBAL LONGITUDINAL STRAIN (GLS): -10.5 %
ECHO LV GLOBAL LONGITUDINAL STRAIN (GLS): -11 %
ECHO LV GLOBAL LONGITUDINAL STRAIN (GLS): -12.6 %
ECHO LV GLOBAL LONGITUDINAL STRAIN (GLS): -9.8 %
ECHO LV INTERNAL DIMENSION DIASTOLE INDEX: 2.89 CM/M2
ECHO LV INTERNAL DIMENSION DIASTOLIC: 5.8 CM (ref 3.9–5.3)
ECHO LV INTERNAL DIMENSION SYSTOLIC INDEX: 2.04 CM/M2
ECHO LV INTERNAL DIMENSION SYSTOLIC: 4.1 CM
ECHO LV IVSD: 1.2 CM (ref 0.6–0.9)
ECHO LV MASS 2D: 297 G (ref 67–162)
ECHO LV MASS INDEX 2D: 147.8 G/M2 (ref 43–95)
ECHO LV POSTERIOR WALL DIASTOLIC: 1.2 CM (ref 0.6–0.9)
ECHO LV RELATIVE WALL THICKNESS RATIO: 0.41
ECHO LVOT AV VTI INDEX: 0.43
ECHO LVOT MEAN GRADIENT: 1 MMHG
ECHO LVOT PEAK GRADIENT: 2 MMHG
ECHO LVOT PEAK VELOCITY: 0.7 M/S
ECHO LVOT VTI: 11.6 CM
ECHO MV A VELOCITY: 0.73 M/S
ECHO MV AREA PHT: 3.1 CM2
ECHO MV E DECELERATION TIME (DT): 247.6 MS
ECHO MV E VELOCITY: 0.79 M/S
ECHO MV E/A RATIO: 1.08
ECHO MV E/E' LATERAL: 21.94
ECHO MV E/E' RATIO (AVERAGED): 22.26
ECHO MV E/E' SEPTAL: 22.57
ECHO MV LVOT VTI INDEX: 2.03
ECHO MV MAX VELOCITY: 1 M/S
ECHO MV MEAN GRADIENT: 2 MMHG
ECHO MV MEAN VELOCITY: 0.6 M/S
ECHO MV PEAK GRADIENT: 4 MMHG
ECHO MV PRESSURE HALF TIME (PHT): 71.8 MS
ECHO MV REGURGITANT ALIASING (NYQUIST) VELOCITY: 37 CM/S
ECHO MV REGURGITANT RADIUS PISA: 0.51 CM
ECHO MV VTI: 23.6 CM
ECHO RV TAPSE: 1.9 CM (ref 1.7–?)
EPITH CASTS URNS QL MICRO: ABNORMAL /LPF
GLUCOSE UR STRIP.AUTO-MCNC: NEGATIVE MG/DL
HGB UR QL STRIP: NEGATIVE
HYALINE CASTS URNS QL MICRO: ABNORMAL /LPF (ref 0–5)
KETONES UR QL STRIP.AUTO: NEGATIVE MG/DL
LEUKOCYTE ESTERASE UR QL STRIP.AUTO: NEGATIVE
NITRITE UR QL STRIP.AUTO: NEGATIVE
PH UR STRIP: 6.5 (ref 5–8)
PROT UR STRIP-MCNC: 30 MG/DL
RBC #/AREA URNS HPF: ABNORMAL /HPF (ref 0–5)
SP GR UR REFRACTOMETRY: 1.03 (ref 1–1.03)
TSH SERPL DL<=0.05 MIU/L-ACNC: 1.32 UIU/ML (ref 0.36–3.74)
UROBILINOGEN UR QL STRIP.AUTO: 1 EU/DL (ref 0.2–1)
WBC URNS QL MICRO: ABNORMAL /HPF (ref 0–4)

## 2024-09-21 PROCEDURE — 2580000003 HC RX 258: Performed by: STUDENT IN AN ORGANIZED HEALTH CARE EDUCATION/TRAINING PROGRAM

## 2024-09-21 PROCEDURE — 93306 TTE W/DOPPLER COMPLETE: CPT

## 2024-09-21 PROCEDURE — 6360000002 HC RX W HCPCS: Performed by: INTERNAL MEDICINE

## 2024-09-21 PROCEDURE — 81001 URINALYSIS AUTO W/SCOPE: CPT

## 2024-09-21 PROCEDURE — 6360000002 HC RX W HCPCS: Performed by: STUDENT IN AN ORGANIZED HEALTH CARE EDUCATION/TRAINING PROGRAM

## 2024-09-21 PROCEDURE — 6370000000 HC RX 637 (ALT 250 FOR IP): Performed by: INTERNAL MEDICINE

## 2024-09-21 PROCEDURE — 2060000000 HC ICU INTERMEDIATE R&B

## 2024-09-21 PROCEDURE — 6370000000 HC RX 637 (ALT 250 FOR IP): Performed by: STUDENT IN AN ORGANIZED HEALTH CARE EDUCATION/TRAINING PROGRAM

## 2024-09-21 PROCEDURE — 70551 MRI BRAIN STEM W/O DYE: CPT

## 2024-09-21 PROCEDURE — 87086 URINE CULTURE/COLONY COUNT: CPT

## 2024-09-21 RX ORDER — AMLODIPINE BESYLATE 5 MG/1
5 TABLET ORAL DAILY
Status: DISCONTINUED | OUTPATIENT
Start: 2024-09-21 | End: 2024-09-21

## 2024-09-21 RX ORDER — LAMOTRIGINE 100 MG/1
50 TABLET ORAL DAILY
Status: DISCONTINUED | OUTPATIENT
Start: 2024-09-22 | End: 2024-09-24 | Stop reason: HOSPADM

## 2024-09-21 RX ORDER — SPIRONOLACTONE 25 MG/1
25 TABLET ORAL DAILY
Status: DISCONTINUED | OUTPATIENT
Start: 2024-09-21 | End: 2024-09-22

## 2024-09-21 RX ORDER — HYDRALAZINE HYDROCHLORIDE 20 MG/ML
10 INJECTION INTRAMUSCULAR; INTRAVENOUS EVERY 6 HOURS PRN
Status: DISCONTINUED | OUTPATIENT
Start: 2024-09-21 | End: 2024-09-24 | Stop reason: HOSPADM

## 2024-09-21 RX ADMIN — VALSARTAN 160 MG: 160 TABLET, FILM COATED ORAL at 08:57

## 2024-09-21 RX ADMIN — SODIUM CHLORIDE 5 MG/HR: 9 INJECTION, SOLUTION INTRAVENOUS at 18:42

## 2024-09-21 RX ADMIN — HYDRALAZINE HYDROCHLORIDE 10 MG: 20 INJECTION INTRAMUSCULAR; INTRAVENOUS at 14:23

## 2024-09-21 RX ADMIN — SODIUM CHLORIDE 5 MG/HR: 9 INJECTION, SOLUTION INTRAVENOUS at 15:37

## 2024-09-21 RX ADMIN — SODIUM CHLORIDE, PRESERVATIVE FREE 10 ML: 5 INJECTION INTRAVENOUS at 20:17

## 2024-09-21 RX ADMIN — SPIRONOLACTONE 25 MG: 25 TABLET ORAL at 11:30

## 2024-09-21 RX ADMIN — ROSUVASTATIN 5 MG: 10 TABLET, FILM COATED ORAL at 20:10

## 2024-09-21 RX ADMIN — HYDROCHLOROTHIAZIDE 12.5 MG: 25 TABLET ORAL at 08:56

## 2024-09-21 RX ADMIN — SODIUM CHLORIDE, PRESERVATIVE FREE 10 ML: 5 INJECTION INTRAVENOUS at 08:58

## 2024-09-21 RX ADMIN — METOPROLOL SUCCINATE 100 MG: 50 TABLET, EXTENDED RELEASE ORAL at 08:56

## 2024-09-21 RX ADMIN — DULOXETINE HYDROCHLORIDE 60 MG: 60 CAPSULE, DELAYED RELEASE ORAL at 20:10

## 2024-09-21 ASSESSMENT — PAIN SCALES - GENERAL: PAINLEVEL_OUTOF10: 0

## 2024-09-22 LAB
ANION GAP SERPL CALC-SCNC: 5 MMOL/L (ref 2–12)
BACTERIA SPEC CULT: NORMAL
BASOPHILS # BLD: 0.1 K/UL (ref 0–0.1)
BASOPHILS NFR BLD: 1 % (ref 0–1)
BUN SERPL-MCNC: 14 MG/DL (ref 6–20)
BUN/CREAT SERPL: 12 (ref 12–20)
CALCIUM SERPL-MCNC: 8.9 MG/DL (ref 8.5–10.1)
CHLORIDE SERPL-SCNC: 105 MMOL/L (ref 97–108)
CO2 SERPL-SCNC: 25 MMOL/L (ref 21–32)
CREAT SERPL-MCNC: 1.19 MG/DL (ref 0.55–1.02)
DIFFERENTIAL METHOD BLD: NORMAL
EOSINOPHIL # BLD: 0.2 K/UL (ref 0–0.4)
EOSINOPHIL NFR BLD: 4 % (ref 0–7)
ERYTHROCYTE [DISTWIDTH] IN BLOOD BY AUTOMATED COUNT: 12.7 % (ref 11.5–14.5)
GLUCOSE SERPL-MCNC: 109 MG/DL (ref 65–100)
HCT VFR BLD AUTO: 38.2 % (ref 35–47)
HGB BLD-MCNC: 12.8 G/DL (ref 11.5–16)
IMM GRANULOCYTES # BLD AUTO: 0 K/UL (ref 0–0.04)
IMM GRANULOCYTES NFR BLD AUTO: 0 % (ref 0–0.5)
LYMPHOCYTES # BLD: 2.4 K/UL (ref 0.8–3.5)
LYMPHOCYTES NFR BLD: 41 % (ref 12–49)
MCH RBC QN AUTO: 29.9 PG (ref 26–34)
MCHC RBC AUTO-ENTMCNC: 33.5 G/DL (ref 30–36.5)
MCV RBC AUTO: 89.3 FL (ref 80–99)
MONOCYTES # BLD: 0.7 K/UL (ref 0–1)
MONOCYTES NFR BLD: 12 % (ref 5–13)
NEUTS SEG # BLD: 2.5 K/UL (ref 1.8–8)
NEUTS SEG NFR BLD: 42 % (ref 32–75)
NRBC # BLD: 0 K/UL (ref 0–0.01)
NRBC BLD-RTO: 0 PER 100 WBC
PLATELET # BLD AUTO: 219 K/UL (ref 150–400)
PMV BLD AUTO: 10.8 FL (ref 8.9–12.9)
POTASSIUM SERPL-SCNC: 3.8 MMOL/L (ref 3.5–5.1)
RBC # BLD AUTO: 4.28 M/UL (ref 3.8–5.2)
SERVICE CMNT-IMP: NORMAL
SODIUM SERPL-SCNC: 135 MMOL/L (ref 136–145)
WBC # BLD AUTO: 5.9 K/UL (ref 3.6–11)

## 2024-09-22 PROCEDURE — 6370000000 HC RX 637 (ALT 250 FOR IP): Performed by: INTERNAL MEDICINE

## 2024-09-22 PROCEDURE — 36415 COLL VENOUS BLD VENIPUNCTURE: CPT

## 2024-09-22 PROCEDURE — 2580000003 HC RX 258: Performed by: STUDENT IN AN ORGANIZED HEALTH CARE EDUCATION/TRAINING PROGRAM

## 2024-09-22 PROCEDURE — 6360000002 HC RX W HCPCS: Performed by: INTERNAL MEDICINE

## 2024-09-22 PROCEDURE — 80048 BASIC METABOLIC PNL TOTAL CA: CPT

## 2024-09-22 PROCEDURE — 2060000000 HC ICU INTERMEDIATE R&B

## 2024-09-22 PROCEDURE — 82088 ASSAY OF ALDOSTERONE: CPT

## 2024-09-22 PROCEDURE — 6360000002 HC RX W HCPCS: Performed by: PSYCHIATRY & NEUROLOGY

## 2024-09-22 PROCEDURE — 99222 1ST HOSP IP/OBS MODERATE 55: CPT | Performed by: PSYCHIATRY & NEUROLOGY

## 2024-09-22 PROCEDURE — 6370000000 HC RX 637 (ALT 250 FOR IP): Performed by: STUDENT IN AN ORGANIZED HEALTH CARE EDUCATION/TRAINING PROGRAM

## 2024-09-22 PROCEDURE — 84244 ASSAY OF RENIN: CPT

## 2024-09-22 PROCEDURE — 85025 COMPLETE CBC W/AUTO DIFF WBC: CPT

## 2024-09-22 PROCEDURE — 6370000000 HC RX 637 (ALT 250 FOR IP)

## 2024-09-22 RX ORDER — METOCLOPRAMIDE HYDROCHLORIDE 5 MG/ML
10 INJECTION INTRAMUSCULAR; INTRAVENOUS ONCE
Status: COMPLETED | OUTPATIENT
Start: 2024-09-22 | End: 2024-09-22

## 2024-09-22 RX ORDER — KETOROLAC TROMETHAMINE 30 MG/ML
30 INJECTION, SOLUTION INTRAMUSCULAR; INTRAVENOUS ONCE
Status: COMPLETED | OUTPATIENT
Start: 2024-09-22 | End: 2024-09-22

## 2024-09-22 RX ORDER — VALSARTAN 160 MG/1
320 TABLET ORAL DAILY
Status: DISCONTINUED | OUTPATIENT
Start: 2024-09-23 | End: 2024-09-24 | Stop reason: HOSPADM

## 2024-09-22 RX ORDER — BUTALBITAL, ACETAMINOPHEN AND CAFFEINE 50; 325; 40 MG/1; MG/1; MG/1
1 TABLET ORAL ONCE AS NEEDED
Status: COMPLETED | OUTPATIENT
Start: 2024-09-22 | End: 2024-09-22

## 2024-09-22 RX ORDER — SPIRONOLACTONE 25 MG/1
25 TABLET ORAL 2 TIMES DAILY
Status: DISCONTINUED | OUTPATIENT
Start: 2024-09-22 | End: 2024-09-24 | Stop reason: HOSPADM

## 2024-09-22 RX ORDER — CARVEDILOL 12.5 MG/1
12.5 TABLET ORAL 2 TIMES DAILY WITH MEALS
Status: DISCONTINUED | OUTPATIENT
Start: 2024-09-22 | End: 2024-09-23

## 2024-09-22 RX ADMIN — SODIUM CHLORIDE, PRESERVATIVE FREE 10 ML: 5 INJECTION INTRAVENOUS at 21:01

## 2024-09-22 RX ADMIN — KETOROLAC TROMETHAMINE 30 MG: 30 INJECTION, SOLUTION INTRAMUSCULAR at 15:35

## 2024-09-22 RX ADMIN — METOCLOPRAMIDE 10 MG: 5 INJECTION, SOLUTION INTRAMUSCULAR; INTRAVENOUS at 15:35

## 2024-09-22 RX ADMIN — DULOXETINE HYDROCHLORIDE 60 MG: 60 CAPSULE, DELAYED RELEASE ORAL at 20:57

## 2024-09-22 RX ADMIN — SPIRONOLACTONE 25 MG: 25 TABLET ORAL at 16:07

## 2024-09-22 RX ADMIN — METOPROLOL SUCCINATE 100 MG: 50 TABLET, EXTENDED RELEASE ORAL at 08:54

## 2024-09-22 RX ADMIN — ROSUVASTATIN 5 MG: 10 TABLET, FILM COATED ORAL at 20:57

## 2024-09-22 RX ADMIN — BUTALBITAL, ACETAMINOPHEN, AND CAFFEINE 1 TABLET: 50; 325; 40 TABLET ORAL at 04:31

## 2024-09-22 RX ADMIN — HYDRALAZINE HYDROCHLORIDE 10 MG: 20 INJECTION INTRAMUSCULAR; INTRAVENOUS at 04:19

## 2024-09-22 RX ADMIN — LAMOTRIGINE 50 MG: 100 TABLET ORAL at 20:57

## 2024-09-22 RX ADMIN — HYDROCHLOROTHIAZIDE 12.5 MG: 25 TABLET ORAL at 08:54

## 2024-09-22 RX ADMIN — SODIUM CHLORIDE, PRESERVATIVE FREE 10 ML: 5 INJECTION INTRAVENOUS at 08:57

## 2024-09-22 RX ADMIN — HYDRALAZINE HYDROCHLORIDE 10 MG: 20 INJECTION INTRAMUSCULAR; INTRAVENOUS at 16:25

## 2024-09-22 RX ADMIN — VALSARTAN 160 MG: 160 TABLET, FILM COATED ORAL at 08:54

## 2024-09-22 RX ADMIN — SPIRONOLACTONE 25 MG: 25 TABLET ORAL at 08:54

## 2024-09-22 RX ADMIN — CARVEDILOL 12.5 MG: 12.5 TABLET, FILM COATED ORAL at 15:35

## 2024-09-22 RX ADMIN — HYDRALAZINE HYDROCHLORIDE 10 MG: 20 INJECTION INTRAMUSCULAR; INTRAVENOUS at 09:45

## 2024-09-22 ASSESSMENT — PAIN DESCRIPTION - DESCRIPTORS
DESCRIPTORS: ACHING
DESCRIPTORS: ACHING

## 2024-09-22 ASSESSMENT — PAIN DESCRIPTION - ORIENTATION
ORIENTATION: INNER
ORIENTATION: ANTERIOR

## 2024-09-22 ASSESSMENT — PAIN SCALES - GENERAL
PAINLEVEL_OUTOF10: 7
PAINLEVEL_OUTOF10: 7
PAINLEVEL_OUTOF10: 5
PAINLEVEL_OUTOF10: 0

## 2024-09-22 ASSESSMENT — PAIN DESCRIPTION - LOCATION
LOCATION: HEAD
LOCATION: HEAD

## 2024-09-23 ENCOUNTER — APPOINTMENT (OUTPATIENT)
Facility: HOSPITAL | Age: 34
DRG: 305 | End: 2024-09-23
Attending: INTERNAL MEDICINE
Payer: COMMERCIAL

## 2024-09-23 ENCOUNTER — APPOINTMENT (OUTPATIENT)
Facility: HOSPITAL | Age: 34
DRG: 305 | End: 2024-09-23
Attending: PSYCHIATRY & NEUROLOGY
Payer: COMMERCIAL

## 2024-09-23 LAB
COMMENT:: NORMAL
CORTIS AM PEAK SERPL-MCNC: 6.3 UG/DL (ref 4.3–22.45)
SPECIMEN HOLD: NORMAL

## 2024-09-23 PROCEDURE — 6370000000 HC RX 637 (ALT 250 FOR IP): Performed by: INTERNAL MEDICINE

## 2024-09-23 PROCEDURE — 6360000002 HC RX W HCPCS: Performed by: STUDENT IN AN ORGANIZED HEALTH CARE EDUCATION/TRAINING PROGRAM

## 2024-09-23 PROCEDURE — 6360000002 HC RX W HCPCS: Performed by: INTERNAL MEDICINE

## 2024-09-23 PROCEDURE — 2580000003 HC RX 258: Performed by: STUDENT IN AN ORGANIZED HEALTH CARE EDUCATION/TRAINING PROGRAM

## 2024-09-23 PROCEDURE — 36415 COLL VENOUS BLD VENIPUNCTURE: CPT

## 2024-09-23 PROCEDURE — 93880 EXTRACRANIAL BILAT STUDY: CPT

## 2024-09-23 PROCEDURE — 1200000000 HC SEMI PRIVATE

## 2024-09-23 PROCEDURE — 6370000000 HC RX 637 (ALT 250 FOR IP)

## 2024-09-23 PROCEDURE — 93975 VASCULAR STUDY: CPT

## 2024-09-23 PROCEDURE — 6370000000 HC RX 637 (ALT 250 FOR IP): Performed by: STUDENT IN AN ORGANIZED HEALTH CARE EDUCATION/TRAINING PROGRAM

## 2024-09-23 PROCEDURE — 82533 TOTAL CORTISOL: CPT

## 2024-09-23 RX ORDER — CARVEDILOL 12.5 MG/1
25 TABLET ORAL 2 TIMES DAILY WITH MEALS
Status: DISCONTINUED | OUTPATIENT
Start: 2024-09-23 | End: 2024-09-24 | Stop reason: HOSPADM

## 2024-09-23 RX ORDER — HYDROCHLOROTHIAZIDE 25 MG/1
12.5 TABLET ORAL
Status: COMPLETED | OUTPATIENT
Start: 2024-09-23 | End: 2024-09-23

## 2024-09-23 RX ORDER — AMLODIPINE BESYLATE 5 MG/1
5 TABLET ORAL DAILY
Status: DISCONTINUED | OUTPATIENT
Start: 2024-09-23 | End: 2024-09-23

## 2024-09-23 RX ORDER — AMLODIPINE BESYLATE 5 MG/1
5 TABLET ORAL 2 TIMES DAILY
Status: DISCONTINUED | OUTPATIENT
Start: 2024-09-23 | End: 2024-09-24 | Stop reason: HOSPADM

## 2024-09-23 RX ORDER — HYDROCHLOROTHIAZIDE 25 MG/1
25 TABLET ORAL DAILY
Status: DISCONTINUED | OUTPATIENT
Start: 2024-09-24 | End: 2024-09-24 | Stop reason: HOSPADM

## 2024-09-23 RX ORDER — MAGNESIUM SULFATE IN WATER 40 MG/ML
2000 INJECTION, SOLUTION INTRAVENOUS ONCE
Status: COMPLETED | OUTPATIENT
Start: 2024-09-23 | End: 2024-09-23

## 2024-09-23 RX ORDER — BUTALBITAL, ACETAMINOPHEN AND CAFFEINE 50; 325; 40 MG/1; MG/1; MG/1
1 TABLET ORAL EVERY 4 HOURS PRN
Status: DISCONTINUED | OUTPATIENT
Start: 2024-09-23 | End: 2024-09-24 | Stop reason: HOSPADM

## 2024-09-23 RX ADMIN — SODIUM CHLORIDE, PRESERVATIVE FREE 10 ML: 5 INJECTION INTRAVENOUS at 09:54

## 2024-09-23 RX ADMIN — ACETAMINOPHEN 650 MG: 325 TABLET ORAL at 07:38

## 2024-09-23 RX ADMIN — HYDROCHLOROTHIAZIDE 12.5 MG: 25 TABLET ORAL at 07:38

## 2024-09-23 RX ADMIN — HYDRALAZINE HYDROCHLORIDE 10 MG: 20 INJECTION INTRAMUSCULAR; INTRAVENOUS at 06:53

## 2024-09-23 RX ADMIN — ROSUVASTATIN 5 MG: 10 TABLET, FILM COATED ORAL at 20:48

## 2024-09-23 RX ADMIN — HYDROCHLOROTHIAZIDE 12.5 MG: 25 TABLET ORAL at 15:08

## 2024-09-23 RX ADMIN — CARVEDILOL 25 MG: 12.5 TABLET, FILM COATED ORAL at 18:07

## 2024-09-23 RX ADMIN — SPIRONOLACTONE 25 MG: 25 TABLET ORAL at 07:38

## 2024-09-23 RX ADMIN — SODIUM CHLORIDE, PRESERVATIVE FREE 10 ML: 5 INJECTION INTRAVENOUS at 20:51

## 2024-09-23 RX ADMIN — AMLODIPINE BESYLATE 5 MG: 5 TABLET ORAL at 20:48

## 2024-09-23 RX ADMIN — BUTALBITAL, ACETAMINOPHEN, AND CAFFEINE 1 TABLET: 325; 50; 40 TABLET ORAL at 11:51

## 2024-09-23 RX ADMIN — BUTALBITAL, ACETAMINOPHEN, AND CAFFEINE 1 TABLET: 325; 50; 40 TABLET ORAL at 20:47

## 2024-09-23 RX ADMIN — MAGNESIUM SULFATE HEPTAHYDRATE 2000 MG: 40 INJECTION, SOLUTION INTRAVENOUS at 11:59

## 2024-09-23 RX ADMIN — CARVEDILOL 12.5 MG: 12.5 TABLET, FILM COATED ORAL at 07:38

## 2024-09-23 RX ADMIN — SODIUM CHLORIDE: 9 INJECTION, SOLUTION INTRAVENOUS at 11:57

## 2024-09-23 RX ADMIN — LAMOTRIGINE 50 MG: 100 TABLET ORAL at 20:48

## 2024-09-23 RX ADMIN — VALSARTAN 320 MG: 160 TABLET, FILM COATED ORAL at 07:38

## 2024-09-23 RX ADMIN — SPIRONOLACTONE 25 MG: 25 TABLET ORAL at 18:07

## 2024-09-23 RX ADMIN — HYDRALAZINE HYDROCHLORIDE 10 MG: 20 INJECTION INTRAMUSCULAR; INTRAVENOUS at 15:04

## 2024-09-23 RX ADMIN — AMLODIPINE BESYLATE 5 MG: 5 TABLET ORAL at 09:54

## 2024-09-23 ASSESSMENT — PAIN SCALES - GENERAL
PAINLEVEL_OUTOF10: 7
PAINLEVEL_OUTOF10: 5
PAINLEVEL_OUTOF10: 4
PAINLEVEL_OUTOF10: 0
PAINLEVEL_OUTOF10: 7
PAINLEVEL_OUTOF10: 4

## 2024-09-23 ASSESSMENT — PAIN DESCRIPTION - DESCRIPTORS: DESCRIPTORS: ACHING

## 2024-09-23 ASSESSMENT — PAIN DESCRIPTION - LOCATION
LOCATION: HEAD

## 2024-09-23 ASSESSMENT — PAIN DESCRIPTION - ORIENTATION: ORIENTATION: ANTERIOR

## 2024-09-24 VITALS
HEART RATE: 85 BPM | BODY MASS INDEX: 32.31 KG/M2 | WEIGHT: 201.06 LBS | DIASTOLIC BLOOD PRESSURE: 70 MMHG | HEIGHT: 66 IN | SYSTOLIC BLOOD PRESSURE: 140 MMHG | RESPIRATION RATE: 14 BRPM | TEMPERATURE: 98.2 F | OXYGEN SATURATION: 97 %

## 2024-09-24 LAB
ALBUMIN SERPL-MCNC: 3.6 G/DL (ref 3.5–5)
ANION GAP SERPL CALC-SCNC: 7 MMOL/L (ref 2–12)
BUN SERPL-MCNC: 18 MG/DL (ref 6–20)
BUN/CREAT SERPL: 12 (ref 12–20)
CALCIUM SERPL-MCNC: 8.8 MG/DL (ref 8.5–10.1)
CHLORIDE SERPL-SCNC: 101 MMOL/L (ref 97–108)
CO2 SERPL-SCNC: 28 MMOL/L (ref 21–32)
COMMENT:: NORMAL
CREAT SERPL-MCNC: 1.48 MG/DL (ref 0.55–1.02)
ECHO BSA: 2.09 M2
ECHO BSA: 2.09 M2
GLUCOSE SERPL-MCNC: 174 MG/DL (ref 65–100)
PHOSPHATE SERPL-MCNC: 4.2 MG/DL (ref 2.6–4.7)
POTASSIUM SERPL-SCNC: 3.7 MMOL/L (ref 3.5–5.1)
SODIUM SERPL-SCNC: 136 MMOL/L (ref 136–145)
SPECIMEN HOLD: NORMAL
VAS AORTA DIST AP: 1.43 CM
VAS AORTA DIST PSV: 124.2 CM/S
VAS AORTA MID PSV: 102.3 CM/S
VAS AORTA PROX AP: 1.88 CM
VAS AORTA PROX PSV: 118 CM/S
VAS CELIAC EDV: 38.9 CM/S
VAS CELIAC PSV: 144.5 CM/S
VAS LEFT CCA DIST EDV: 33.3 CM/S
VAS LEFT CCA DIST PSV: 82.7 CM/S
VAS LEFT CCA PROX EDV: 23.4 CM/S
VAS LEFT CCA PROX PSV: 83.8 CM/S
VAS LEFT ECA EDV: 15.7 CM/S
VAS LEFT ECA PSV: 69.5 CM/S
VAS LEFT ICA DIST EDV: 43.2 CM/S
VAS LEFT ICA DIST PSV: 99.6 CM/S
VAS LEFT ICA MID EDV: 48.8 CM/S
VAS LEFT ICA MID PSV: 95.1 CM/S
VAS LEFT ICA PROX EDV: 34 CM/S
VAS LEFT ICA PROX PSV: 85.6 CM/S
VAS LEFT ICA/CCA PSV: 1.2 NO UNITS
VAS LEFT KIDNEY LENGTH: 9.33 CM
VAS LEFT RENAL DIST EDV: 31.2 CM/S
VAS LEFT RENAL DIST PSV: 88.5 CM/S
VAS LEFT RENAL DIST RAR: 0.87
VAS LEFT RENAL DIST RI: 0.65 NO UNITS
VAS LEFT RENAL LOWER PARENCHYMA EDV: 10.2 CM/S
VAS LEFT RENAL LOWER PARENCHYMA PSV: 40.4 CM/S
VAS LEFT RENAL LOWER PARENCHYMA RI: 0.75
VAS LEFT RENAL MID EDV: 34 CM/S
VAS LEFT RENAL MID PSV: 92.4 CM/S
VAS LEFT RENAL MID RAR: 0.9
VAS LEFT RENAL MID RI: 0.63 NO UNITS
VAS LEFT RENAL MIDDLE PARENCHYMA EDV: 15.8 CM/S
VAS LEFT RENAL MIDDLE PARENCHYMA PSV: 40.3 CM/S
VAS LEFT RENAL MIDDLE PARENCHYMA RI: 0.61
VAS LEFT RENAL PROX EDV: 35.6 CM/S
VAS LEFT RENAL PROX PSV: 93.3 CM/S
VAS LEFT RENAL PROX RAR: 0.91
VAS LEFT RENAL PROX RI: 0.62
VAS LEFT RENAL RAR: 0.91
VAS LEFT RENAL UPPER PARENCHYMA EDV: 18.9 CM/S
VAS LEFT RENAL UPPER PARENCHYMA PSV: 56.3 CM/S
VAS LEFT RENAL UPPER PARENCHYMA RI: 0.66
VAS LEFT VERTEBRAL EDV: 14.8 CM/S
VAS LEFT VERTEBRAL PSV: 36.4 CM/S
VAS PROX SMA EDV: 15.5 CM/S
VAS PROX SMA PSV: 123.4 CM/S
VAS RIGHT CCA DIST EDV: 30.8 CM/S
VAS RIGHT CCA DIST PSV: 80.3 CM/S
VAS RIGHT CCA PROX EDV: 23 CM/S
VAS RIGHT CCA PROX PSV: 84.3 CM/S
VAS RIGHT ECA EDV: 13.8 CM/S
VAS RIGHT ECA PSV: 72.5 CM/S
VAS RIGHT ICA DIST EDV: 45.4 CM/S
VAS RIGHT ICA DIST PSV: 95.5 CM/S
VAS RIGHT ICA MID EDV: 39.9 CM/S
VAS RIGHT ICA MID PSV: 86.8 CM/S
VAS RIGHT ICA PROX EDV: 25.5 CM/S
VAS RIGHT ICA PROX PSV: 58.1 CM/S
VAS RIGHT ICA/CCA PSV: 1.2 NO UNITS
VAS RIGHT KIDNEY LENGTH: 11.16 CM
VAS RIGHT KIDNEY WIDTH: 4.13 CM
VAS RIGHT RENAL DIST EDV: 22.9 CM/S
VAS RIGHT RENAL DIST PSV: 87.5 CM/S
VAS RIGHT RENAL DIST RAR: 0.86
VAS RIGHT RENAL DIST RI: 0.74
VAS RIGHT RENAL LOWER PARENCHYMA EDV: 11.9 CM/S
VAS RIGHT RENAL LOWER PARENCHYMA PSV: 37.1 CM/S
VAS RIGHT RENAL LOWER PARENCHYMA RI: 0.68
VAS RIGHT RENAL MID EDV: 26.6 CM/S
VAS RIGHT RENAL MID PSV: 88.6 CM/S
VAS RIGHT RENAL MID RAR: 0.87
VAS RIGHT RENAL MID RI: 0.7
VAS RIGHT RENAL MIDDLE PARENCHYMA EDV: 17.4 CM/S
VAS RIGHT RENAL MIDDLE PARENCHYMA PSV: 39.9 CM/S
VAS RIGHT RENAL MIDDLE PARENCHYMA RI: 0.56
VAS RIGHT RENAL PROX EDV: 38.3 CM/S
VAS RIGHT RENAL PROX PSV: 96.6 CM/S
VAS RIGHT RENAL PROX RAR: 0.94
VAS RIGHT RENAL PROX RI: 0.6
VAS RIGHT RENAL RAR: 0.94
VAS RIGHT RENAL UPPER PARENCHYMA EDV: 13.5 CM/S
VAS RIGHT RENAL UPPER PARENCHYMA PSV: 38.8 CM/S
VAS RIGHT RENAL UPPER PARENCHYMA RI: 0.65
VAS RIGHT VERTEBRAL EDV: 16.1 CM/S
VAS RIGHT VERTEBRAL PSV: 41 CM/S

## 2024-09-24 PROCEDURE — 80069 RENAL FUNCTION PANEL: CPT

## 2024-09-24 PROCEDURE — 6370000000 HC RX 637 (ALT 250 FOR IP): Performed by: STUDENT IN AN ORGANIZED HEALTH CARE EDUCATION/TRAINING PROGRAM

## 2024-09-24 PROCEDURE — 2580000003 HC RX 258: Performed by: STUDENT IN AN ORGANIZED HEALTH CARE EDUCATION/TRAINING PROGRAM

## 2024-09-24 PROCEDURE — 6370000000 HC RX 637 (ALT 250 FOR IP): Performed by: INTERNAL MEDICINE

## 2024-09-24 RX ORDER — CARVEDILOL 25 MG/1
25 TABLET ORAL 2 TIMES DAILY WITH MEALS
Qty: 60 TABLET | Refills: 3 | Status: SHIPPED | OUTPATIENT
Start: 2024-09-24

## 2024-09-24 RX ORDER — HYDROCHLOROTHIAZIDE 25 MG/1
25 TABLET ORAL DAILY
Qty: 30 TABLET | Refills: 3 | Status: SHIPPED | OUTPATIENT
Start: 2024-09-25

## 2024-09-24 RX ORDER — VALSARTAN 320 MG/1
320 TABLET ORAL DAILY
Qty: 30 TABLET | Refills: 3 | Status: SHIPPED | OUTPATIENT
Start: 2024-09-25

## 2024-09-24 RX ORDER — AMLODIPINE BESYLATE 5 MG/1
5 TABLET ORAL 2 TIMES DAILY
Qty: 30 TABLET | Refills: 3 | Status: SHIPPED | OUTPATIENT
Start: 2024-09-24

## 2024-09-24 RX ORDER — SPIRONOLACTONE 25 MG/1
50 TABLET ORAL
Qty: 30 TABLET | Refills: 3 | Status: SHIPPED | OUTPATIENT
Start: 2024-09-24

## 2024-09-24 RX ORDER — BUTALBITAL, ACETAMINOPHEN AND CAFFEINE 50; 325; 40 MG/1; MG/1; MG/1
1 TABLET ORAL EVERY 6 HOURS PRN
Qty: 180 TABLET | Refills: 0 | Status: SHIPPED | OUTPATIENT
Start: 2024-09-24

## 2024-09-24 RX ADMIN — CARVEDILOL 25 MG: 12.5 TABLET, FILM COATED ORAL at 08:53

## 2024-09-24 RX ADMIN — SODIUM CHLORIDE, PRESERVATIVE FREE 10 ML: 5 INJECTION INTRAVENOUS at 08:54

## 2024-09-24 RX ADMIN — BUTALBITAL, ACETAMINOPHEN, AND CAFFEINE 1 TABLET: 325; 50; 40 TABLET ORAL at 04:05

## 2024-09-24 RX ADMIN — SPIRONOLACTONE 25 MG: 25 TABLET ORAL at 08:51

## 2024-09-24 RX ADMIN — VALSARTAN 320 MG: 160 TABLET, FILM COATED ORAL at 08:52

## 2024-09-24 RX ADMIN — AMLODIPINE BESYLATE 5 MG: 5 TABLET ORAL at 08:52

## 2024-09-24 RX ADMIN — HYDROCHLOROTHIAZIDE 25 MG: 25 TABLET ORAL at 08:52

## 2024-09-24 ASSESSMENT — PAIN DESCRIPTION - LOCATION: LOCATION: HEAD

## 2024-09-24 ASSESSMENT — PAIN DESCRIPTION - ORIENTATION: ORIENTATION: ANTERIOR

## 2024-09-24 ASSESSMENT — PAIN SCALES - GENERAL: PAINLEVEL_OUTOF10: 4

## 2024-09-27 ENCOUNTER — TELEPHONE (OUTPATIENT)
Age: 34
End: 2024-09-27

## 2024-09-28 LAB
ALDOST SERPL-MCNC: 11.7 NG/DL (ref 0–30)
ALDOST/RENIN PLAS-RTO: 22.5 (ref 0–30)
RENIN PLAS-CCNC: 0.52 NG/ML/HR (ref 0.17–5.38)

## 2024-12-05 ENCOUNTER — OFFICE VISIT (OUTPATIENT)
Age: 34
End: 2024-12-05

## 2024-12-05 VITALS
HEART RATE: 90 BPM | RESPIRATION RATE: 16 BRPM | BODY MASS INDEX: 33.11 KG/M2 | DIASTOLIC BLOOD PRESSURE: 76 MMHG | SYSTOLIC BLOOD PRESSURE: 104 MMHG | TEMPERATURE: 99.9 F | WEIGHT: 206 LBS | OXYGEN SATURATION: 99 % | HEIGHT: 66 IN

## 2024-12-05 DIAGNOSIS — R50.9 FEVER, UNSPECIFIED FEVER CAUSE: ICD-10-CM

## 2024-12-05 DIAGNOSIS — J06.9 VIRAL UPPER RESPIRATORY ILLNESS: Primary | ICD-10-CM

## 2024-12-05 PROBLEM — N18.31 STAGE 3A CHRONIC KIDNEY DISEASE (HCC): Status: ACTIVE | Noted: 2024-09-30

## 2024-12-05 LAB
GROUP A STREP ANTIGEN, POC: NEGATIVE
Lab: NORMAL
PERFORMING INSTRUMENT: NORMAL
QC PASS/FAIL: NORMAL
QUICKVUE INFLUENZA TEST: NEGATIVE
SARS-COV-2, POC: NORMAL
VALID INTERNAL CONTROL, POC: NORMAL
VALID INTERNAL CONTROL, POC: NORMAL

## 2024-12-05 RX ORDER — BENZONATATE 200 MG/1
200 CAPSULE ORAL 3 TIMES DAILY PRN
Qty: 21 CAPSULE | Refills: 0 | Status: SHIPPED | OUTPATIENT
Start: 2024-12-05 | End: 2024-12-12

## 2024-12-05 RX ORDER — FLUTICASONE PROPIONATE 50 MCG
2 SPRAY, SUSPENSION (ML) NASAL DAILY
Qty: 16 G | Refills: 0 | Status: SHIPPED | OUTPATIENT
Start: 2024-12-05

## 2024-12-05 NOTE — PROGRESS NOTES
Value Ref Range    Valid Internal Control, POC pass     QuickVue Influenza test Negative    POCT COVID-19, Antigen   Result Value Ref Range    SARS-COV-2, POC Not-Detected Not Detected    Lot Number [id number]     QC Pass/Fail pass     Performing Instrument BinaxNOW            An electronic signature was used to authenticate this note.    --Nam Chaney, APRN - CNP

## 2024-12-12 ENCOUNTER — HOSPITAL ENCOUNTER (EMERGENCY)
Facility: HOSPITAL | Age: 34
Discharge: HOME | End: 2024-12-12
Attending: EMERGENCY MEDICINE
Payer: COMMERCIAL

## 2024-12-12 ENCOUNTER — APPOINTMENT (OUTPATIENT)
Facility: HOSPITAL | Age: 34
End: 2024-12-12
Payer: COMMERCIAL

## 2024-12-12 VITALS
DIASTOLIC BLOOD PRESSURE: 78 MMHG | HEART RATE: 100 BPM | RESPIRATION RATE: 20 BRPM | BODY MASS INDEX: 32.56 KG/M2 | SYSTOLIC BLOOD PRESSURE: 108 MMHG | OXYGEN SATURATION: 96 % | TEMPERATURE: 98.6 F | HEIGHT: 66 IN | WEIGHT: 202.6 LBS

## 2024-12-12 DIAGNOSIS — R05.8 POST-VIRAL COUGH SYNDROME: Primary | ICD-10-CM

## 2024-12-12 LAB
APPEARANCE UR: CLEAR
BACTERIA URNS QL MICRO: ABNORMAL /HPF
BILIRUB UR QL: NEGATIVE
COLOR UR: ABNORMAL
EPITH CASTS URNS QL MICRO: ABNORMAL /LPF
GLUCOSE UR STRIP.AUTO-MCNC: NEGATIVE MG/DL
HGB UR QL STRIP: NEGATIVE
KETONES UR QL STRIP.AUTO: ABNORMAL MG/DL
LEUKOCYTE ESTERASE UR QL STRIP.AUTO: NEGATIVE
NITRITE UR QL STRIP.AUTO: NEGATIVE
PH UR STRIP: 6 (ref 5–8)
PROT UR STRIP-MCNC: ABNORMAL MG/DL
RBC #/AREA URNS HPF: ABNORMAL /HPF
SP GR UR REFRACTOMETRY: 1.02 (ref 1–1.03)
UROBILINOGEN UR QL STRIP.AUTO: 0.2 EU/DL (ref 0.2–1)
WBC URNS QL MICRO: ABNORMAL /HPF (ref 0–4)

## 2024-12-12 PROCEDURE — 87086 URINE CULTURE/COLONY COUNT: CPT

## 2024-12-12 PROCEDURE — 71046 X-RAY EXAM CHEST 2 VIEWS: CPT

## 2024-12-12 PROCEDURE — 99284 EMERGENCY DEPT VISIT MOD MDM: CPT

## 2024-12-12 PROCEDURE — 81001 URINALYSIS AUTO W/SCOPE: CPT

## 2024-12-12 RX ORDER — HYDROCODONE POLISTIREX AND CHLORPHENIRAMINE POLISTIREX 10; 8 MG/5ML; MG/5ML
5 SUSPENSION, EXTENDED RELEASE ORAL EVERY 12 HOURS PRN
Qty: 70 ML | Refills: 0 | Status: SHIPPED | OUTPATIENT
Start: 2024-12-12 | End: 2024-12-19

## 2024-12-12 ASSESSMENT — LIFESTYLE VARIABLES
HOW MANY STANDARD DRINKS CONTAINING ALCOHOL DO YOU HAVE ON A TYPICAL DAY: PATIENT DOES NOT DRINK
HOW OFTEN DO YOU HAVE A DRINK CONTAINING ALCOHOL: NEVER

## 2024-12-12 ASSESSMENT — PAIN DESCRIPTION - LOCATION: LOCATION: FACE;HEAD

## 2024-12-12 ASSESSMENT — PAIN SCALES - GENERAL: PAINLEVEL_OUTOF10: 5

## 2024-12-12 ASSESSMENT — PAIN - FUNCTIONAL ASSESSMENT: PAIN_FUNCTIONAL_ASSESSMENT: 0-10

## 2024-12-13 NOTE — ED PROVIDER NOTES
[facility] [place]  EMERGENCY DEPARTMENT ENCOUNTER      Pt Name: Yaz Walters  MRN: [id number]  Birthdate 1990  Date of evaluation: 12/12/2024  Provider: Pietro Dominique MD    CHIEF COMPLAINT       Chief Complaint   Patient presents with    Cough         HISTORY OF PRESENT ILLNESS   (Location/Symptom, Timing/Onset, Context/Setting, Quality, Duration, Modifying Factors, Severity)  Note limiting factors.   HPI      Review of External Medical Records:     Nursing Notes were reviewed.    REVIEW OF SYSTEMS    (2-9 systems for level 4, 10 or more for level 5)     Review of Systems    Except as noted above the remainder of the review of systems was reviewed and negative.       PAST MEDICAL HISTORY     Past Medical History:   Diagnosis Date    Asthma     Cold and exercise induced    Bipolar 1 disorder (HCC)     Gestational diabetes     diet controlled    High cholesterol     Hypertension     Migraine     Obesity (BMI 30.0-34.9)     Postpartum depression          SURGICAL HISTORY     No past surgical history on file.      CURRENT MEDICATIONS       Discharge Medication List as of 12/12/2024  8:49 PM        CONTINUE these medications which have NOT CHANGED    Details   benzonatate (TESSALON) 200 MG capsule Take 1 capsule by mouth 3 times daily as needed for Cough, Disp-21 capsule, R-0Normal      fluticasone (FLONASE) 50 MCG/ACT nasal spray 2 sprays by Each Nostril route daily, Disp-16 g, R-0Normal      butalbital-acetaminophen-caffeine (FIORICET, ESGIC) -40 MG per tablet Take 1 tablet by mouth every 6 hours as needed for Migraine, Disp-180 tablet, R-0Normal      valsartan (DIOVAN) 320 MG tablet Take 1 tablet by mouth daily, Disp-30 tablet, R-3Normal      carvedilol (COREG) 25 MG tablet Take 1 tablet by mouth 2 times daily (with meals), Disp-60 tablet, R-3Normal      amLODIPine (NORVASC) 5 MG tablet Take 1 tablet by mouth in the morning and at bedtime, Disp-30 tablet, R-3Normal      hydroCHLOROthiazide

## 2024-12-13 NOTE — ED TRIAGE NOTES
Pt presents to the ER for a persistent cough that began the 1st of this month. Was seen at an urgent care center recently and tested for COVID/flu and strep that came back negative. Did not have a chest XR. Was prescribed Tessalon perles w/o relief.

## 2024-12-13 NOTE — ED NOTES
Discharge instruction reviewed by PAUL Betancourt with the patient.  The patient verbalized understanding. Patient provided with AVS.      Patient is ambulatory and steady gait upon discharge. Patient is AAOX4, breathing even and unlabored, skin warm and dry, skin intact.    Patient mobility status  with no difficulty. Provider aware     Patient left ED via Discharge Method: ambulatory to Home.    Opportunity for questions and clarification provided.     Patient given 0 paper scripts.

## 2024-12-14 LAB
BACTERIA SPEC CULT: NORMAL
SERVICE CMNT-IMP: NORMAL

## 2024-12-27 DIAGNOSIS — J06.9 VIRAL UPPER RESPIRATORY ILLNESS: ICD-10-CM

## 2025-01-03 RX ORDER — FLUTICASONE PROPIONATE 50 MCG
2 SPRAY, SUSPENSION (ML) NASAL DAILY
Refills: 1 | OUTPATIENT
Start: 2025-01-03

## 2025-03-03 ENCOUNTER — OFFICE VISIT (OUTPATIENT)
Facility: CLINIC | Age: 35
End: 2025-03-03

## 2025-03-03 VITALS
WEIGHT: 184 LBS | SYSTOLIC BLOOD PRESSURE: 102 MMHG | HEIGHT: 66 IN | TEMPERATURE: 98.7 F | DIASTOLIC BLOOD PRESSURE: 73 MMHG | OXYGEN SATURATION: 98 % | RESPIRATION RATE: 18 BRPM | HEART RATE: 74 BPM | BODY MASS INDEX: 29.57 KG/M2

## 2025-03-03 DIAGNOSIS — G43.111 INTRACTABLE MIGRAINE WITH AURA WITH STATUS MIGRAINOSUS: ICD-10-CM

## 2025-03-03 DIAGNOSIS — N18.31 STAGE 3A CHRONIC KIDNEY DISEASE (HCC): ICD-10-CM

## 2025-03-03 DIAGNOSIS — Z00.01 ENCOUNTER FOR GENERAL ADULT MEDICAL EXAMINATION WITH ABNORMAL FINDINGS: Primary | ICD-10-CM

## 2025-03-03 DIAGNOSIS — R53.83 FATIGUE, UNSPECIFIED TYPE: ICD-10-CM

## 2025-03-03 DIAGNOSIS — F31.32 BIPOLAR DISORDER, CURRENT EPISODE DEPRESSED, MODERATE (HCC): ICD-10-CM

## 2025-03-03 DIAGNOSIS — Z00.00 PHYSICAL EXAM: ICD-10-CM

## 2025-03-03 DIAGNOSIS — G43.111 MIGRAINE WITH AURA, INTRACTABLE, WITH STATUS MIGRAINOSUS: ICD-10-CM

## 2025-03-03 DIAGNOSIS — I10 ESSENTIAL HYPERTENSION: ICD-10-CM

## 2025-03-03 LAB
25(OH)D3 SERPL-MCNC: 16.1 NG/ML (ref 30–100)
ALBUMIN SERPL-MCNC: 4.4 G/DL (ref 3.5–5)
ALBUMIN/GLOB SERPL: 1.3 (ref 1.1–2.2)
ALP SERPL-CCNC: 49 U/L (ref 45–117)
ALT SERPL-CCNC: 49 U/L (ref 12–78)
ANION GAP SERPL CALC-SCNC: 4 MMOL/L (ref 2–12)
AST SERPL-CCNC: 63 U/L (ref 15–37)
BILIRUB SERPL-MCNC: 0.6 MG/DL (ref 0.2–1)
BUN SERPL-MCNC: 15 MG/DL (ref 6–20)
BUN/CREAT SERPL: 12 (ref 12–20)
CALCIUM SERPL-MCNC: 9.6 MG/DL (ref 8.5–10.1)
CHLORIDE SERPL-SCNC: 109 MMOL/L (ref 97–108)
CHOLEST SERPL-MCNC: 228 MG/DL
CO2 SERPL-SCNC: 26 MMOL/L (ref 21–32)
CREAT SERPL-MCNC: 1.27 MG/DL (ref 0.55–1.02)
ERYTHROCYTE [DISTWIDTH] IN BLOOD BY AUTOMATED COUNT: 12.2 % (ref 11.5–14.5)
EST. AVERAGE GLUCOSE BLD GHB EST-MCNC: 100 MG/DL
GLOBULIN SER CALC-MCNC: 3.5 G/DL (ref 2–4)
GLUCOSE SERPL-MCNC: 85 MG/DL (ref 65–100)
HBA1C MFR BLD: 5.1 % (ref 4–5.6)
HCT VFR BLD AUTO: 40.3 % (ref 35–47)
HDLC SERPL-MCNC: 48 MG/DL
HDLC SERPL: 4.8 (ref 0–5)
HGB BLD-MCNC: 12.7 G/DL (ref 11.5–16)
LDLC SERPL CALC-MCNC: 159.2 MG/DL (ref 0–100)
MCH RBC QN AUTO: 29.8 PG (ref 26–34)
MCHC RBC AUTO-ENTMCNC: 31.5 G/DL (ref 30–36.5)
MCV RBC AUTO: 94.6 FL (ref 80–99)
NRBC # BLD: 0 K/UL (ref 0–0.01)
NRBC BLD-RTO: 0 PER 100 WBC
PLATELET # BLD AUTO: 227 K/UL (ref 150–400)
PMV BLD AUTO: 11.1 FL (ref 8.9–12.9)
POTASSIUM SERPL-SCNC: 4.5 MMOL/L (ref 3.5–5.1)
PROT SERPL-MCNC: 7.9 G/DL (ref 6.4–8.2)
RBC # BLD AUTO: 4.26 M/UL (ref 3.8–5.2)
SODIUM SERPL-SCNC: 139 MMOL/L (ref 136–145)
TRIGL SERPL-MCNC: 104 MG/DL
TSH SERPL DL<=0.05 MIU/L-ACNC: 0.67 UIU/ML (ref 0.36–3.74)
VIT B12 SERPL-MCNC: 398 PG/ML (ref 193–986)
VLDLC SERPL CALC-MCNC: 20.8 MG/DL
WBC # BLD AUTO: 5.2 K/UL (ref 3.6–11)

## 2025-03-03 RX ORDER — GALCANEZUMAB 120 MG/ML
INJECTION, SOLUTION SUBCUTANEOUS
Qty: 1 ML | Refills: 2 | Status: ACTIVE | OUTPATIENT
Start: 2025-03-03

## 2025-03-03 RX ORDER — OMEPRAZOLE 40 MG/1
40 CAPSULE, DELAYED RELEASE ORAL DAILY
COMMUNITY
Start: 2024-12-05

## 2025-03-03 RX ORDER — TIRZEPATIDE 10 MG/.5ML
10 INJECTION, SOLUTION SUBCUTANEOUS WEEKLY
COMMUNITY
Start: 2025-02-20

## 2025-03-03 SDOH — ECONOMIC STABILITY: FOOD INSECURITY: WITHIN THE PAST 12 MONTHS, THE FOOD YOU BOUGHT JUST DIDN'T LAST AND YOU DIDN'T HAVE MONEY TO GET MORE.: NEVER TRUE

## 2025-03-03 SDOH — ECONOMIC STABILITY: FOOD INSECURITY: WITHIN THE PAST 12 MONTHS, YOU WORRIED THAT YOUR FOOD WOULD RUN OUT BEFORE YOU GOT MONEY TO BUY MORE.: NEVER TRUE

## 2025-03-03 ASSESSMENT — ENCOUNTER SYMPTOMS
ALLERGIC/IMMUNOLOGIC NEGATIVE: 1
CONSTIPATION: 0
SHORTNESS OF BREATH: 0
COUGH: 0
DIARRHEA: 0
RHINORRHEA: 0
EYES NEGATIVE: 1
ABDOMINAL PAIN: 0
SINUS PAIN: 0
SORE THROAT: 0

## 2025-03-03 ASSESSMENT — PATIENT HEALTH QUESTIONNAIRE - PHQ9
8. MOVING OR SPEAKING SO SLOWLY THAT OTHER PEOPLE COULD HAVE NOTICED. OR THE OPPOSITE, BEING SO FIGETY OR RESTLESS THAT YOU HAVE BEEN MOVING AROUND A LOT MORE THAN USUAL: SEVERAL DAYS
3. TROUBLE FALLING OR STAYING ASLEEP: SEVERAL DAYS
SUM OF ALL RESPONSES TO PHQ QUESTIONS 1-9: 8
10. IF YOU CHECKED OFF ANY PROBLEMS, HOW DIFFICULT HAVE THESE PROBLEMS MADE IT FOR YOU TO DO YOUR WORK, TAKE CARE OF THINGS AT HOME, OR GET ALONG WITH OTHER PEOPLE: SOMEWHAT DIFFICULT
SUM OF ALL RESPONSES TO PHQ QUESTIONS 1-9: 8
9. THOUGHTS THAT YOU WOULD BE BETTER OFF DEAD, OR OF HURTING YOURSELF: NOT AT ALL
SUM OF ALL RESPONSES TO PHQ QUESTIONS 1-9: 8
7. TROUBLE CONCENTRATING ON THINGS, SUCH AS READING THE NEWSPAPER OR WATCHING TELEVISION: SEVERAL DAYS
6. FEELING BAD ABOUT YOURSELF - OR THAT YOU ARE A FAILURE OR HAVE LET YOURSELF OR YOUR FAMILY DOWN: SEVERAL DAYS
4. FEELING TIRED OR HAVING LITTLE ENERGY: MORE THAN HALF THE DAYS
1. LITTLE INTEREST OR PLEASURE IN DOING THINGS: SEVERAL DAYS
5. POOR APPETITE OR OVEREATING: NOT AT ALL
2. FEELING DOWN, DEPRESSED OR HOPELESS: SEVERAL DAYS
SUM OF ALL RESPONSES TO PHQ QUESTIONS 1-9: 8

## 2025-03-03 NOTE — PROGRESS NOTES
Chief Complaint   Patient presents with    Annual Exam    Hypertension    Lab Collection     Requesting Basic labs + Vit D, TSH, B-12  Fasting today    Weight Management     [facility]: Arturo Pugh     Patient states consent for provider to use FELICIANO System for documentation of this visit.    \"Have you been to the ER, urgent care clinic since your last visit?  Hospitalized since your last visit?\"    NO    “Have you seen or consulted any other health care providers outside our system since your last visit?”    [facility] and [facility]: Arturo Pugh             
normal. She had a second mammogram and was told that it was normal. She had a Pap smear earlier last year. She has recently incorporated more physical activity into her routine.    She typically consults Dr. Mcmanus for her migraines and is prescribed Emgality. However, she has encountered difficulties in scheduling appointments with him and obtaining refills from the pharmacy. She discontinued the medication due to an absence of migraines but was advised against this as the medication would be completely eliminated from her system after 6 months, potentially leading to a recurrence of migraines.    She plans to consult her psychiatrist as she believes an adjustment in her medication dosage is necessary due to a resurgence in her depression symptoms. She is currently seeing a counselor and is very happy with her.    She has a history of kidney disease and is under the care of a nephrologist, whom she consults annually. She was informed that her hypertension was a consequence of her kidney disease. She is requesting refills for her blood pressure medications, which were initially prescribed during her hospital stay.    She was hospitalized in September 2024 for 2 to 3 days due to a hypertensive crisis, with blood pressure readings exceeding 200. She is here today for a follow-up visit post-hospitalization, which she had previously missed. She reports that her antihypertensive medications appear to be effective.    MEDICATIONS  Current: Emgality, semaglutide    Screenings    Ophthalmology/optometrist:  2024  Breast cancer screening:  N/A  recommended age 40-75  Pap smear:  Will do this year  recommended age 21-65  Colonoscopy: N/A recommended age 45-75  Cologuard: N/A recommended age 45-75  Annual physical:  03/03/2025  DEXA: N/A recommended age 65+      Review of Systems   Constitutional:  Negative for activity change, appetite change and fatigue.   HENT:  Negative for postnasal drip, rhinorrhea, sinus pain and sore

## 2025-03-03 NOTE — PATIENT INSTRUCTIONS
General information about the practice.    We here at [facility] want to ensure that our patients receive the best quality care possible.  Noted to help aid in this process we allow patients to see other providers in the office when they have a chronic disease that needs to be managed but is unable to get an appointment in with their current PCP.  We generally request that they see the other provider and then follow-up with their primary care provider.  This is to ensure that you always get seen in a timely fashion to get your needs met.    I see patients virtually on Monday afternoons. These appointments are for medication refills, high blood pressure, diabetes, high cholesterol and mood check ins.     For my patients I recommend that if you have something that comes up that you feel needs an appointment and are unable to get on my schedule.  Please reach out to me prior to scheduling with anybody else to see if I can fit you in as a double book.  The appointment would be to speak about the issue and only the issue for that appointment.    To also aid in convenience and care for you we have Mayela Ascencio on the second floor who is our acute care provider.  She can see patients regarding conditions such as but not limited to UTI, allergic rhinitis, flu, COVID.  This is to help aid our patients in keeping cost down by not having to present to urgent cares and ERs for general illnesses.  As an FYI she does not manage any chronic diseases at her appointments only acute conditions.    Other acute care visits you can make with Mayela Ascencio is  Urinary Problems  Fever  Cough  Congestion, runny nose  Constipation, diarrhea  Skin infections or rash  Ear symptoms  Joint or muscle pain  Headache

## 2025-03-04 NOTE — RESULT ENCOUNTER NOTE
below 7, or as close to 7 as we can get. Please continue with our current treatment plan and keep up the good work! We will check on this again during our next routine follow-up.    Your CBC which looks at your white blood cells, red blood cells, and platelets came back looking normal. No sign of infection or anemia.       Sincerely,  Lester

## 2025-03-05 DIAGNOSIS — E55.9 VITAMIN D DEFICIENCY: ICD-10-CM

## 2025-03-05 DIAGNOSIS — E78.5 HYPERLIPIDEMIA, UNSPECIFIED HYPERLIPIDEMIA TYPE: Primary | ICD-10-CM

## 2025-03-05 RX ORDER — ATORVASTATIN CALCIUM 20 MG/1
20 TABLET, FILM COATED ORAL DAILY
Qty: 90 TABLET | Refills: 0 | Status: SHIPPED | OUTPATIENT
Start: 2025-03-05

## 2025-03-05 RX ORDER — CHOLECALCIFEROL (VITAMIN D3) 1250 MCG
1 TABLET ORAL WEEKLY
Qty: 13 TABLET | Refills: 1 | Status: SHIPPED | OUTPATIENT
Start: 2025-03-05

## 2025-03-05 NOTE — PROGRESS NOTES
Patient was contacted by the nurse based off the lab results and was recommended to start on atorvastatin.  Patient agreed and medication sent into the pharmacy.

## 2025-06-05 DIAGNOSIS — E78.5 HYPERLIPIDEMIA, UNSPECIFIED HYPERLIPIDEMIA TYPE: ICD-10-CM

## 2025-06-05 RX ORDER — ATORVASTATIN CALCIUM 20 MG/1
20 TABLET, FILM COATED ORAL DAILY
Qty: 90 TABLET | Refills: 0 | Status: SHIPPED | OUTPATIENT
Start: 2025-06-05

## 2025-07-09 DIAGNOSIS — I10 ESSENTIAL HYPERTENSION: Primary | ICD-10-CM

## 2025-07-09 RX ORDER — CARVEDILOL 25 MG/1
25 TABLET ORAL 2 TIMES DAILY WITH MEALS
Qty: 60 TABLET | Refills: 3 | Status: SHIPPED | OUTPATIENT
Start: 2025-07-09

## 2025-07-09 RX ORDER — HYDROCHLOROTHIAZIDE 25 MG/1
25 TABLET ORAL DAILY
Qty: 30 TABLET | Refills: 3 | Status: SHIPPED | OUTPATIENT
Start: 2025-07-09

## 2025-07-09 RX ORDER — AMLODIPINE BESYLATE 5 MG/1
5 TABLET ORAL 2 TIMES DAILY
Qty: 30 TABLET | Refills: 3 | Status: SHIPPED | OUTPATIENT
Start: 2025-07-09

## 2025-07-09 RX ORDER — VALSARTAN 320 MG/1
320 TABLET ORAL DAILY
Qty: 30 TABLET | Refills: 3 | Status: SHIPPED | OUTPATIENT
Start: 2025-07-09

## 2025-07-09 RX ORDER — SPIRONOLACTONE 25 MG/1
50 TABLET ORAL
Qty: 30 TABLET | Refills: 3 | Status: SHIPPED | OUTPATIENT
Start: 2025-07-09

## 2025-07-17 DIAGNOSIS — I10 ESSENTIAL HYPERTENSION: ICD-10-CM

## 2025-07-17 RX ORDER — SPIRONOLACTONE 25 MG/1
TABLET ORAL
Qty: 180 TABLET | Refills: 1 | Status: SHIPPED | OUTPATIENT
Start: 2025-07-17